# Patient Record
Sex: FEMALE | Race: WHITE | Employment: OTHER | URBAN - METROPOLITAN AREA
[De-identification: names, ages, dates, MRNs, and addresses within clinical notes are randomized per-mention and may not be internally consistent; named-entity substitution may affect disease eponyms.]

---

## 2017-04-24 ENCOUNTER — APPOINTMENT (OUTPATIENT)
Dept: ULTRASOUND IMAGING | Age: 57
End: 2017-04-24
Payer: MEDICARE

## 2017-04-24 ENCOUNTER — HOSPITAL ENCOUNTER (EMERGENCY)
Age: 57
Discharge: HOME | End: 2017-04-24
Payer: MEDICARE

## 2017-04-24 VITALS
DIASTOLIC BLOOD PRESSURE: 75 MMHG | TEMPERATURE: 97.5 F | HEART RATE: 64 BPM | SYSTOLIC BLOOD PRESSURE: 122 MMHG | OXYGEN SATURATION: 98 % | BODY MASS INDEX: 35.64 KG/M2 | HEIGHT: 63 IN | WEIGHT: 201.13 LBS | RESPIRATION RATE: 18 BRPM

## 2017-04-24 DIAGNOSIS — M79.604 PAIN IN BOTH LOWER EXTREMITIES: Primary | ICD-10-CM

## 2017-04-24 DIAGNOSIS — M79.605 PAIN IN BOTH LOWER EXTREMITIES: Primary | ICD-10-CM

## 2017-04-24 LAB
ALBUMIN SERPL-MCNC: 4 G/DL (ref 3.9–4.9)
ALP BLD-CCNC: 108 U/L (ref 40–130)
ALT SERPL-CCNC: 13 U/L (ref 0–33)
ANION GAP SERPL CALCULATED.3IONS-SCNC: 13 MEQ/L (ref 7–13)
APTT: 30.1 SEC (ref 21.6–35.4)
AST SERPL-CCNC: 14 U/L (ref 0–35)
BILIRUB SERPL-MCNC: 0.1 MG/DL (ref 0–1.2)
BUN BLDV-MCNC: 14 MG/DL (ref 6–20)
CALCIUM SERPL-MCNC: 9 MG/DL (ref 8.6–10.2)
CHLORIDE BLD-SCNC: 107 MEQ/L (ref 98–107)
CO2: 23 MEQ/L (ref 22–29)
CREAT SERPL-MCNC: 0.38 MG/DL (ref 0.5–0.9)
GFR AFRICAN AMERICAN: >60
GFR NON-AFRICAN AMERICAN: >60
GLOBULIN: 2.4 G/DL (ref 2.3–3.5)
GLUCOSE BLD-MCNC: 83 MG/DL (ref 74–109)
HCT VFR BLD CALC: 40.6 % (ref 37–47)
HEMOGLOBIN: 13.8 G/DL (ref 12–16)
INR BLD: 1
MCH RBC QN AUTO: 32 PG (ref 27–31.3)
MCHC RBC AUTO-ENTMCNC: 34 % (ref 33–37)
MCV RBC AUTO: 94.2 FL (ref 82–100)
PDW BLD-RTO: 13.4 % (ref 11.5–14.5)
PLATELET # BLD: 166 K/UL (ref 130–400)
POTASSIUM SERPL-SCNC: 4 MEQ/L (ref 3.5–5.1)
PROTHROMBIN TIME: 10.6 SEC (ref 8.1–13.7)
RBC # BLD: 4.31 M/UL (ref 4.2–5.4)
SODIUM BLD-SCNC: 143 MEQ/L (ref 132–144)
TOTAL PROTEIN: 6.4 G/DL (ref 6.4–8.1)
WBC # BLD: 6.4 K/UL (ref 4.8–10.8)

## 2017-04-24 PROCEDURE — 80053 COMPREHEN METABOLIC PANEL: CPT

## 2017-04-24 PROCEDURE — 85730 THROMBOPLASTIN TIME PARTIAL: CPT

## 2017-04-24 PROCEDURE — 93970 EXTREMITY STUDY: CPT

## 2017-04-24 PROCEDURE — 6370000000 HC RX 637 (ALT 250 FOR IP): Performed by: PHYSICIAN ASSISTANT

## 2017-04-24 PROCEDURE — 85027 COMPLETE CBC AUTOMATED: CPT

## 2017-04-24 PROCEDURE — 93005 ELECTROCARDIOGRAM TRACING: CPT

## 2017-04-24 PROCEDURE — 99284 EMERGENCY DEPT VISIT MOD MDM: CPT

## 2017-04-24 PROCEDURE — 36415 COLL VENOUS BLD VENIPUNCTURE: CPT

## 2017-04-24 PROCEDURE — 85610 PROTHROMBIN TIME: CPT

## 2017-04-24 RX ORDER — OXYCODONE HYDROCHLORIDE AND ACETAMINOPHEN 5; 325 MG/1; MG/1
1 TABLET ORAL ONCE
Status: COMPLETED | OUTPATIENT
Start: 2017-04-24 | End: 2017-04-24

## 2017-04-24 RX ADMIN — OXYCODONE HYDROCHLORIDE AND ACETAMINOPHEN 1 TABLET: 5; 325 TABLET ORAL at 19:02

## 2017-04-24 ASSESSMENT — PAIN SCALES - GENERAL
PAINLEVEL_OUTOF10: 10
PAINLEVEL_OUTOF10: 10

## 2017-04-24 ASSESSMENT — ENCOUNTER SYMPTOMS
SHORTNESS OF BREATH: 0
COLOR CHANGE: 0
WHEEZING: 0
COUGH: 0
CONSTIPATION: 0
ABDOMINAL DISTENTION: 0
BACK PAIN: 0
EYE DISCHARGE: 0
VOMITING: 0
NAUSEA: 0
ABDOMINAL PAIN: 0
SORE THROAT: 0
CHOKING: 0
STRIDOR: 0
RHINORRHEA: 0

## 2017-04-24 ASSESSMENT — PAIN DESCRIPTION - LOCATION: LOCATION: LEG

## 2017-04-24 ASSESSMENT — PAIN DESCRIPTION - DESCRIPTORS: DESCRIPTORS: CONSTANT

## 2017-04-24 ASSESSMENT — PAIN DESCRIPTION - PAIN TYPE: TYPE: CHRONIC PAIN

## 2017-04-25 LAB
EKG ATRIAL RATE: 55 BPM
EKG P AXIS: 58 DEGREES
EKG P-R INTERVAL: 198 MS
EKG Q-T INTERVAL: 492 MS
EKG QRS DURATION: 162 MS
EKG QTC CALCULATION (BAZETT): 470 MS
EKG R AXIS: -55 DEGREES
EKG T AXIS: 95 DEGREES
EKG VENTRICULAR RATE: 55 BPM

## 2017-05-12 ENCOUNTER — HOSPITAL ENCOUNTER (INPATIENT)
Age: 57
LOS: 3 days | Discharge: NURSING, REHAB OR LONG-TERM CARE | DRG: 349 | End: 2017-05-15
Attending: STUDENT IN AN ORGANIZED HEALTH CARE EDUCATION/TRAINING PROGRAM | Admitting: INTERNAL MEDICINE
Payer: MEDICAID

## 2017-05-12 ENCOUNTER — APPOINTMENT (OUTPATIENT)
Dept: CT IMAGING | Age: 57
DRG: 349 | End: 2017-05-12
Payer: MEDICAID

## 2017-05-12 DIAGNOSIS — Z86.79 HISTORY OF PERIPHERAL VASCULAR DISEASE: ICD-10-CM

## 2017-05-12 DIAGNOSIS — T87.43 INFECTION OF AMPUTATION STUMP, RIGHT LOWER EXTREMITY (HCC): Primary | ICD-10-CM

## 2017-05-12 DIAGNOSIS — Z78.9 POOR HISTORIAN: ICD-10-CM

## 2017-05-12 LAB
ALBUMIN SERPL-MCNC: 4.1 G/DL (ref 3.9–4.9)
ALP BLD-CCNC: 114 U/L (ref 40–130)
ALT SERPL-CCNC: 12 U/L (ref 0–33)
ANION GAP SERPL CALCULATED.3IONS-SCNC: 9 MEQ/L (ref 7–13)
APTT: 24.3 SEC (ref 21.6–35.4)
AST SERPL-CCNC: 14 U/L (ref 0–35)
BASOPHILS ABSOLUTE: 0.1 K/UL (ref 0–0.2)
BASOPHILS RELATIVE PERCENT: 1 %
BILIRUB SERPL-MCNC: 0.1 MG/DL (ref 0–1.2)
BILIRUBIN URINE: NEGATIVE
BLOOD, URINE: NEGATIVE
BUN BLDV-MCNC: 12 MG/DL (ref 6–20)
C-REACTIVE PROTEIN, HIGH SENSITIVITY: 3.1 MG/L (ref 0–5)
CALCIUM SERPL-MCNC: 9.1 MG/DL (ref 8.6–10.2)
CHLORIDE BLD-SCNC: 105 MEQ/L (ref 98–107)
CLARITY: CLEAR
CO2: 26 MEQ/L (ref 22–29)
COLOR: YELLOW
CREAT SERPL-MCNC: 0.46 MG/DL (ref 0.5–0.9)
EOSINOPHILS ABSOLUTE: 0.3 K/UL (ref 0–0.7)
EOSINOPHILS RELATIVE PERCENT: 5.7 %
GFR AFRICAN AMERICAN: >60
GFR NON-AFRICAN AMERICAN: >60
GLOBULIN: 2.5 G/DL (ref 2.3–3.5)
GLUCOSE BLD-MCNC: 86 MG/DL (ref 74–109)
GLUCOSE URINE: NEGATIVE MG/DL
HCT VFR BLD CALC: 43 % (ref 37–47)
HEMOGLOBIN: 14.3 G/DL (ref 12–16)
INR BLD: 1
KETONES, URINE: NEGATIVE MG/DL
LACTIC ACID: 0.8 MMOL/L (ref 0.5–2.2)
LEUKOCYTE ESTERASE, URINE: NEGATIVE
LYMPHOCYTES ABSOLUTE: 2.3 K/UL (ref 1–4.8)
LYMPHOCYTES RELATIVE PERCENT: 40.9 %
MCH RBC QN AUTO: 32.1 PG (ref 27–31.3)
MCHC RBC AUTO-ENTMCNC: 33.2 % (ref 33–37)
MCV RBC AUTO: 96.9 FL (ref 82–100)
MONOCYTES ABSOLUTE: 0.4 K/UL (ref 0.2–0.8)
MONOCYTES RELATIVE PERCENT: 7.8 %
NEUTROPHILS ABSOLUTE: 2.5 K/UL (ref 1.4–6.5)
NEUTROPHILS RELATIVE PERCENT: 44.6 %
NITRITE, URINE: NEGATIVE
PDW BLD-RTO: 13.8 % (ref 11.5–14.5)
PH UA: 7.5 (ref 5–9)
PLATELET # BLD: 171 K/UL (ref 130–400)
POC CREATININE WHOLE BLOOD: 0.5
POTASSIUM SERPL-SCNC: 4.1 MEQ/L (ref 3.5–5.1)
PROTEIN UA: NEGATIVE MG/DL
PROTHROMBIN TIME: 10.6 SEC (ref 8.1–13.7)
RBC # BLD: 4.44 M/UL (ref 4.2–5.4)
SEDIMENTATION RATE, ERYTHROCYTE: 13 MM (ref 0–30)
SODIUM BLD-SCNC: 140 MEQ/L (ref 132–144)
SPECIFIC GRAVITY UA: 1.01 (ref 1–1.03)
TOTAL CK: 61 U/L (ref 0–170)
TOTAL PROTEIN: 6.6 G/DL (ref 6.4–8.1)
URINE REFLEX TO CULTURE: NORMAL
UROBILINOGEN, URINE: 0.2 E.U./DL
WBC # BLD: 5.6 K/UL (ref 4.8–10.8)

## 2017-05-12 PROCEDURE — 2580000003 HC RX 258: Performed by: INTERNAL MEDICINE

## 2017-05-12 PROCEDURE — 81003 URINALYSIS AUTO W/O SCOPE: CPT

## 2017-05-12 PROCEDURE — 96375 TX/PRO/DX INJ NEW DRUG ADDON: CPT

## 2017-05-12 PROCEDURE — 96367 TX/PROPH/DG ADDL SEQ IV INF: CPT

## 2017-05-12 PROCEDURE — 87077 CULTURE AEROBIC IDENTIFY: CPT

## 2017-05-12 PROCEDURE — 85652 RBC SED RATE AUTOMATED: CPT

## 2017-05-12 PROCEDURE — 73701 CT LOWER EXTREMITY W/DYE: CPT

## 2017-05-12 PROCEDURE — 6360000004 HC RX CONTRAST MEDICATION: Performed by: RADIOLOGY

## 2017-05-12 PROCEDURE — 85730 THROMBOPLASTIN TIME PARTIAL: CPT

## 2017-05-12 PROCEDURE — 80053 COMPREHEN METABOLIC PANEL: CPT

## 2017-05-12 PROCEDURE — 86141 C-REACTIVE PROTEIN HS: CPT

## 2017-05-12 PROCEDURE — 85610 PROTHROMBIN TIME: CPT

## 2017-05-12 PROCEDURE — 1210000000 HC MED SURG R&B

## 2017-05-12 PROCEDURE — 85025 COMPLETE CBC W/AUTO DIFF WBC: CPT

## 2017-05-12 PROCEDURE — 82550 ASSAY OF CK (CPK): CPT

## 2017-05-12 PROCEDURE — 2580000003 HC RX 258: Performed by: STUDENT IN AN ORGANIZED HEALTH CARE EDUCATION/TRAINING PROGRAM

## 2017-05-12 PROCEDURE — 2580000003 HC RX 258: Performed by: RADIOLOGY

## 2017-05-12 PROCEDURE — 96365 THER/PROPH/DIAG IV INF INIT: CPT

## 2017-05-12 PROCEDURE — 83605 ASSAY OF LACTIC ACID: CPT

## 2017-05-12 PROCEDURE — 87149 DNA/RNA DIRECT PROBE: CPT

## 2017-05-12 PROCEDURE — 36415 COLL VENOUS BLD VENIPUNCTURE: CPT

## 2017-05-12 PROCEDURE — 6360000002 HC RX W HCPCS: Performed by: STUDENT IN AN ORGANIZED HEALTH CARE EDUCATION/TRAINING PROGRAM

## 2017-05-12 PROCEDURE — 87040 BLOOD CULTURE FOR BACTERIA: CPT

## 2017-05-12 PROCEDURE — 99285 EMERGENCY DEPT VISIT HI MDM: CPT

## 2017-05-12 PROCEDURE — 6370000000 HC RX 637 (ALT 250 FOR IP): Performed by: INTERNAL MEDICINE

## 2017-05-12 RX ORDER — ZONISAMIDE 100 MG/1
100 CAPSULE ORAL DAILY
Status: DISCONTINUED | OUTPATIENT
Start: 2017-05-13 | End: 2017-05-15 | Stop reason: HOSPADM

## 2017-05-12 RX ORDER — BISACODYL 10 MG
10 SUPPOSITORY, RECTAL RECTAL DAILY
Status: DISCONTINUED | OUTPATIENT
Start: 2017-05-13 | End: 2017-05-15 | Stop reason: HOSPADM

## 2017-05-12 RX ORDER — KETOROLAC TROMETHAMINE 30 MG/ML
30 INJECTION, SOLUTION INTRAMUSCULAR; INTRAVENOUS ONCE
Status: COMPLETED | OUTPATIENT
Start: 2017-05-12 | End: 2017-05-12

## 2017-05-12 RX ORDER — MORPHINE SULFATE 4 MG/ML
4 INJECTION, SOLUTION INTRAMUSCULAR; INTRAVENOUS
Status: DISCONTINUED | OUTPATIENT
Start: 2017-05-12 | End: 2017-05-15 | Stop reason: HOSPADM

## 2017-05-12 RX ORDER — GUAIFENESIN 100 MG/5ML
10 SOLUTION ORAL EVERY 4 HOURS PRN
Status: DISCONTINUED | OUTPATIENT
Start: 2017-05-12 | End: 2017-05-15 | Stop reason: HOSPADM

## 2017-05-12 RX ORDER — FENTANYL CITRATE 50 UG/ML
100 INJECTION, SOLUTION INTRAMUSCULAR; INTRAVENOUS ONCE
Status: COMPLETED | OUTPATIENT
Start: 2017-05-12 | End: 2017-05-12

## 2017-05-12 RX ORDER — ACETAMINOPHEN 325 MG/1
650 TABLET ORAL EVERY 4 HOURS PRN
Status: DISCONTINUED | OUTPATIENT
Start: 2017-05-12 | End: 2017-05-15 | Stop reason: HOSPADM

## 2017-05-12 RX ORDER — ASPIRIN 81 MG/1
81 TABLET, CHEWABLE ORAL DAILY
Status: DISCONTINUED | OUTPATIENT
Start: 2017-05-13 | End: 2017-05-15 | Stop reason: HOSPADM

## 2017-05-12 RX ORDER — FUROSEMIDE 20 MG/1
20 TABLET ORAL 2 TIMES DAILY
Status: DISCONTINUED | OUTPATIENT
Start: 2017-05-12 | End: 2017-05-15 | Stop reason: HOSPADM

## 2017-05-12 RX ORDER — MORPHINE SULFATE 2 MG/ML
2 INJECTION, SOLUTION INTRAMUSCULAR; INTRAVENOUS
Status: DISCONTINUED | OUTPATIENT
Start: 2017-05-12 | End: 2017-05-15 | Stop reason: HOSPADM

## 2017-05-12 RX ORDER — ATORVASTATIN CALCIUM 10 MG/1
10 TABLET, FILM COATED ORAL NIGHTLY
Status: DISCONTINUED | OUTPATIENT
Start: 2017-05-12 | End: 2017-05-15 | Stop reason: HOSPADM

## 2017-05-12 RX ORDER — SENNA AND DOCUSATE SODIUM 50; 8.6 MG/1; MG/1
1 TABLET, FILM COATED ORAL DAILY
Status: DISCONTINUED | OUTPATIENT
Start: 2017-05-13 | End: 2017-05-15 | Stop reason: HOSPADM

## 2017-05-12 RX ORDER — MAGNESIUM HYDROXIDE/ALUMINUM HYDROXICE/SIMETHICONE 120; 1200; 1200 MG/30ML; MG/30ML; MG/30ML
30 SUSPENSION ORAL EVERY 6 HOURS PRN
Status: DISCONTINUED | OUTPATIENT
Start: 2017-05-12 | End: 2017-05-15 | Stop reason: HOSPADM

## 2017-05-12 RX ORDER — ISOSORBIDE MONONITRATE 30 MG/1
30 TABLET, EXTENDED RELEASE ORAL DAILY
Status: DISCONTINUED | OUTPATIENT
Start: 2017-05-13 | End: 2017-05-15 | Stop reason: HOSPADM

## 2017-05-12 RX ORDER — SODIUM CHLORIDE 0.9 % (FLUSH) 0.9 %
10 SYRINGE (ML) INJECTION PRN
Status: DISCONTINUED | OUTPATIENT
Start: 2017-05-12 | End: 2017-05-15 | Stop reason: HOSPADM

## 2017-05-12 RX ORDER — ONDANSETRON 2 MG/ML
4 INJECTION INTRAMUSCULAR; INTRAVENOUS EVERY 6 HOURS PRN
Status: DISCONTINUED | OUTPATIENT
Start: 2017-05-12 | End: 2017-05-15 | Stop reason: HOSPADM

## 2017-05-12 RX ORDER — LANOLIN ALCOHOL/MO/W.PET/CERES
6 CREAM (GRAM) TOPICAL DAILY
Status: DISCONTINUED | OUTPATIENT
Start: 2017-05-13 | End: 2017-05-15 | Stop reason: HOSPADM

## 2017-05-12 RX ORDER — HYDROCODONE BITARTRATE AND ACETAMINOPHEN 5; 325 MG/1; MG/1
2 TABLET ORAL EVERY 4 HOURS PRN
Status: DISCONTINUED | OUTPATIENT
Start: 2017-05-12 | End: 2017-05-15 | Stop reason: HOSPADM

## 2017-05-12 RX ORDER — METOPROLOL SUCCINATE 25 MG/1
25 TABLET, EXTENDED RELEASE ORAL DAILY
Status: DISCONTINUED | OUTPATIENT
Start: 2017-05-13 | End: 2017-05-15 | Stop reason: HOSPADM

## 2017-05-12 RX ORDER — GABAPENTIN 300 MG/1
300 CAPSULE ORAL 3 TIMES DAILY
Status: DISCONTINUED | OUTPATIENT
Start: 2017-05-12 | End: 2017-05-15 | Stop reason: HOSPADM

## 2017-05-12 RX ORDER — DULOXETIN HYDROCHLORIDE 20 MG/1
40 CAPSULE, DELAYED RELEASE ORAL DAILY
Status: DISCONTINUED | OUTPATIENT
Start: 2017-05-13 | End: 2017-05-15 | Stop reason: HOSPADM

## 2017-05-12 RX ORDER — ACETAMINOPHEN 325 MG/1
650 TABLET ORAL EVERY 6 HOURS PRN
Status: DISCONTINUED | OUTPATIENT
Start: 2017-05-12 | End: 2017-05-15 | Stop reason: HOSPADM

## 2017-05-12 RX ORDER — HYDROCODONE BITARTRATE AND ACETAMINOPHEN 5; 325 MG/1; MG/1
1 TABLET ORAL EVERY 4 HOURS PRN
Status: DISCONTINUED | OUTPATIENT
Start: 2017-05-12 | End: 2017-05-15 | Stop reason: HOSPADM

## 2017-05-12 RX ORDER — SODIUM CHLORIDE 0.9 % (FLUSH) 0.9 %
10 SYRINGE (ML) INJECTION EVERY 12 HOURS SCHEDULED
Status: DISCONTINUED | OUTPATIENT
Start: 2017-05-12 | End: 2017-05-15 | Stop reason: HOSPADM

## 2017-05-12 RX ORDER — OXYCODONE HYDROCHLORIDE 5 MG/1
5 TABLET ORAL EVERY 4 HOURS PRN
Status: DISCONTINUED | OUTPATIENT
Start: 2017-05-12 | End: 2017-05-15 | Stop reason: HOSPADM

## 2017-05-12 RX ADMIN — GABAPENTIN 300 MG: 300 CAPSULE ORAL at 21:44

## 2017-05-12 RX ADMIN — VANCOMYCIN HYDROCHLORIDE 1250 MG: 1 INJECTION, POWDER, LYOPHILIZED, FOR SOLUTION INTRAVENOUS at 17:29

## 2017-05-12 RX ADMIN — Medication 10 ML: at 17:16

## 2017-05-12 RX ADMIN — PIPERACILLIN, TAZOBACTAM 3.38 G: 3; .375 INJECTION, POWDER, LYOPHILIZED, FOR SOLUTION INTRAVENOUS at 17:01

## 2017-05-12 RX ADMIN — IOPAMIDOL 100 ML: 755 INJECTION, SOLUTION INTRAVENOUS at 17:15

## 2017-05-12 RX ADMIN — FENTANYL CITRATE 100 MCG: 50 INJECTION, SOLUTION INTRAMUSCULAR; INTRAVENOUS at 18:07

## 2017-05-12 RX ADMIN — SODIUM CHLORIDE, PRESERVATIVE FREE 10 ML: 5 INJECTION INTRAVENOUS at 21:45

## 2017-05-12 RX ADMIN — ATORVASTATIN CALCIUM 10 MG: 10 TABLET, FILM COATED ORAL at 21:42

## 2017-05-12 RX ADMIN — HYDROCODONE BITARTRATE AND ACETAMINOPHEN 2 TABLET: 5; 325 TABLET ORAL at 21:42

## 2017-05-12 RX ADMIN — FUROSEMIDE 20 MG: 20 TABLET ORAL at 21:42

## 2017-05-12 RX ADMIN — KETOROLAC TROMETHAMINE 30 MG: 30 INJECTION, SOLUTION INTRAMUSCULAR at 16:43

## 2017-05-12 ASSESSMENT — PAIN DESCRIPTION - FREQUENCY
FREQUENCY: INTERMITTENT

## 2017-05-12 ASSESSMENT — PAIN SCALES - GENERAL
PAINLEVEL_OUTOF10: 10
PAINLEVEL_OUTOF10: 8
PAINLEVEL_OUTOF10: 10
PAINLEVEL_OUTOF10: 0
PAINLEVEL_OUTOF10: 7
PAINLEVEL_OUTOF10: 9
PAINLEVEL_OUTOF10: 8
PAINLEVEL_OUTOF10: 7

## 2017-05-12 ASSESSMENT — PAIN DESCRIPTION - ORIENTATION
ORIENTATION: RIGHT

## 2017-05-12 ASSESSMENT — PAIN DESCRIPTION - ONSET
ONSET: ON-GOING

## 2017-05-12 ASSESSMENT — PAIN DESCRIPTION - LOCATION
LOCATION: KNEE

## 2017-05-12 ASSESSMENT — PAIN DESCRIPTION - PROGRESSION
CLINICAL_PROGRESSION: GRADUALLY WORSENING
CLINICAL_PROGRESSION: GRADUALLY IMPROVING
CLINICAL_PROGRESSION: GRADUALLY IMPROVING

## 2017-05-12 ASSESSMENT — ENCOUNTER SYMPTOMS
COUGH: 0
SHORTNESS OF BREATH: 0

## 2017-05-12 ASSESSMENT — PAIN DESCRIPTION - DESCRIPTORS
DESCRIPTORS: ACHING

## 2017-05-12 ASSESSMENT — PAIN DESCRIPTION - PAIN TYPE
TYPE: ACUTE PAIN

## 2017-05-13 LAB
ALBUMIN SERPL-MCNC: 3.5 G/DL (ref 3.9–4.9)
ALP BLD-CCNC: 95 U/L (ref 40–130)
ALT SERPL-CCNC: 11 U/L (ref 0–33)
ANION GAP SERPL CALCULATED.3IONS-SCNC: 9 MEQ/L (ref 7–13)
AST SERPL-CCNC: 13 U/L (ref 0–35)
BILIRUB SERPL-MCNC: 0.1 MG/DL (ref 0–1.2)
BUN BLDV-MCNC: 13 MG/DL (ref 6–20)
CALCIUM SERPL-MCNC: 8.5 MG/DL (ref 8.6–10.2)
CHLORIDE BLD-SCNC: 112 MEQ/L (ref 98–107)
CO2: 26 MEQ/L (ref 22–29)
CREAT SERPL-MCNC: 0.4 MG/DL (ref 0.5–0.9)
GFR AFRICAN AMERICAN: >60
GFR NON-AFRICAN AMERICAN: >60
GLOBULIN: 2.3 G/DL (ref 2.3–3.5)
GLUCOSE BLD-MCNC: 74 MG/DL (ref 74–109)
HCT VFR BLD CALC: 41.2 % (ref 37–47)
HEMOGLOBIN: 13.8 G/DL (ref 12–16)
MCH RBC QN AUTO: 32.4 PG (ref 27–31.3)
MCHC RBC AUTO-ENTMCNC: 33.4 % (ref 33–37)
MCV RBC AUTO: 97.1 FL (ref 82–100)
PDW BLD-RTO: 13.8 % (ref 11.5–14.5)
PLATELET # BLD: 148 K/UL (ref 130–400)
POTASSIUM SERPL-SCNC: 4.1 MEQ/L (ref 3.5–5.1)
RBC # BLD: 4.24 M/UL (ref 4.2–5.4)
SODIUM BLD-SCNC: 147 MEQ/L (ref 132–144)
TOTAL PROTEIN: 5.8 G/DL (ref 6.4–8.1)
WBC # BLD: 5.4 K/UL (ref 4.8–10.8)

## 2017-05-13 PROCEDURE — 6370000000 HC RX 637 (ALT 250 FOR IP): Performed by: INTERNAL MEDICINE

## 2017-05-13 PROCEDURE — 2580000003 HC RX 258: Performed by: INTERNAL MEDICINE

## 2017-05-13 PROCEDURE — G8978 MOBILITY CURRENT STATUS: HCPCS

## 2017-05-13 PROCEDURE — 6360000002 HC RX W HCPCS: Performed by: INTERNAL MEDICINE

## 2017-05-13 PROCEDURE — G8979 MOBILITY GOAL STATUS: HCPCS

## 2017-05-13 PROCEDURE — 80053 COMPREHEN METABOLIC PANEL: CPT

## 2017-05-13 PROCEDURE — 85027 COMPLETE CBC AUTOMATED: CPT

## 2017-05-13 PROCEDURE — 36415 COLL VENOUS BLD VENIPUNCTURE: CPT

## 2017-05-13 PROCEDURE — 99254 IP/OBS CNSLTJ NEW/EST MOD 60: CPT | Performed by: INTERNAL MEDICINE

## 2017-05-13 PROCEDURE — 1210000000 HC MED SURG R&B

## 2017-05-13 PROCEDURE — 97162 PT EVAL MOD COMPLEX 30 MIN: CPT

## 2017-05-13 RX ADMIN — DULOXETINE 40 MG: 20 CAPSULE, DELAYED RELEASE ORAL at 10:16

## 2017-05-13 RX ADMIN — ASPIRIN 81 MG CHEWABLE TABLET 81 MG: 81 TABLET CHEWABLE at 10:16

## 2017-05-13 RX ADMIN — HYDROCODONE BITARTRATE AND ACETAMINOPHEN 2 TABLET: 5; 325 TABLET ORAL at 06:45

## 2017-05-13 RX ADMIN — EXTENDED PHENYTOIN SODIUM 30 MG: 30 CAPSULE ORAL at 10:16

## 2017-05-13 RX ADMIN — ISOSORBIDE MONONITRATE 30 MG: 30 TABLET, EXTENDED RELEASE ORAL at 10:16

## 2017-05-13 RX ADMIN — GABAPENTIN 300 MG: 300 CAPSULE ORAL at 14:19

## 2017-05-13 RX ADMIN — MORPHINE SULFATE 4 MG: 4 INJECTION, SOLUTION INTRAMUSCULAR; INTRAVENOUS at 14:19

## 2017-05-13 RX ADMIN — EXTENDED PHENYTOIN SODIUM 30 MG: 30 CAPSULE ORAL at 01:51

## 2017-05-13 RX ADMIN — HYDROCODONE BITARTRATE AND ACETAMINOPHEN 2 TABLET: 5; 325 TABLET ORAL at 18:46

## 2017-05-13 RX ADMIN — PIPERACILLIN SODIUM AND TAZOBACTAM SODIUM 3.38 G: 3; .375 INJECTION, POWDER, LYOPHILIZED, FOR SOLUTION INTRAVENOUS at 17:09

## 2017-05-13 RX ADMIN — PIPERACILLIN SODIUM AND TAZOBACTAM SODIUM 3.38 G: 3; .375 INJECTION, POWDER, LYOPHILIZED, FOR SOLUTION INTRAVENOUS at 10:17

## 2017-05-13 RX ADMIN — ENOXAPARIN SODIUM 40 MG: 40 INJECTION SUBCUTANEOUS at 10:15

## 2017-05-13 RX ADMIN — OXYCODONE HYDROCHLORIDE 5 MG: 5 TABLET ORAL at 21:50

## 2017-05-13 RX ADMIN — PIPERACILLIN SODIUM AND TAZOBACTAM SODIUM 3.38 G: 3; .375 INJECTION, POWDER, LYOPHILIZED, FOR SOLUTION INTRAVENOUS at 01:51

## 2017-05-13 RX ADMIN — SODIUM CHLORIDE, PRESERVATIVE FREE 10 ML: 5 INJECTION INTRAVENOUS at 21:50

## 2017-05-13 RX ADMIN — HYDROCODONE BITARTRATE AND ACETAMINOPHEN 2 TABLET: 5; 325 TABLET ORAL at 11:34

## 2017-05-13 RX ADMIN — FUROSEMIDE 20 MG: 20 TABLET ORAL at 10:16

## 2017-05-13 RX ADMIN — MELATONIN 6 MG: 3 TAB ORAL at 10:16

## 2017-05-13 RX ADMIN — ZONISAMIDE 100 MG: 100 CAPSULE ORAL at 10:16

## 2017-05-13 RX ADMIN — GABAPENTIN 300 MG: 300 CAPSULE ORAL at 10:15

## 2017-05-13 RX ADMIN — EXTENDED PHENYTOIN SODIUM 30 MG: 30 CAPSULE ORAL at 21:50

## 2017-05-13 RX ADMIN — FUROSEMIDE 20 MG: 20 TABLET ORAL at 21:50

## 2017-05-13 RX ADMIN — HYDROCODONE BITARTRATE AND ACETAMINOPHEN 2 TABLET: 5; 325 TABLET ORAL at 01:46

## 2017-05-13 RX ADMIN — GABAPENTIN 300 MG: 300 CAPSULE ORAL at 21:50

## 2017-05-13 RX ADMIN — ATORVASTATIN CALCIUM 10 MG: 10 TABLET, FILM COATED ORAL at 21:50

## 2017-05-13 RX ADMIN — SODIUM CHLORIDE, PRESERVATIVE FREE 10 ML: 5 INJECTION INTRAVENOUS at 10:17

## 2017-05-13 RX ADMIN — SENNOSIDES AND DOCUSATE SODIUM 1 TABLET: 8.6; 5 TABLET ORAL at 10:15

## 2017-05-13 RX ADMIN — METOPROLOL SUCCINATE 25 MG: 25 TABLET, EXTENDED RELEASE ORAL at 10:16

## 2017-05-13 ASSESSMENT — PAIN DESCRIPTION - ORIENTATION
ORIENTATION: RIGHT

## 2017-05-13 ASSESSMENT — PAIN DESCRIPTION - DESCRIPTORS
DESCRIPTORS: ACHING
DESCRIPTORS: ACHING

## 2017-05-13 ASSESSMENT — PAIN SCALES - GENERAL
PAINLEVEL_OUTOF10: 10
PAINLEVEL_OUTOF10: 9
PAINLEVEL_OUTOF10: 10
PAINLEVEL_OUTOF10: 7
PAINLEVEL_OUTOF10: 8
PAINLEVEL_OUTOF10: 6
PAINLEVEL_OUTOF10: 10

## 2017-05-13 ASSESSMENT — PAIN DESCRIPTION - LOCATION
LOCATION: GENERALIZED;LEG
LOCATION: LEG

## 2017-05-13 ASSESSMENT — PAIN DESCRIPTION - PAIN TYPE
TYPE: PHANTOM PAIN
TYPE: CHRONIC PAIN
TYPE: PHANTOM PAIN

## 2017-05-14 LAB
ANION GAP SERPL CALCULATED.3IONS-SCNC: 11 MEQ/L (ref 7–13)
BUN BLDV-MCNC: 13 MG/DL (ref 6–20)
CALCIUM SERPL-MCNC: 8.6 MG/DL (ref 8.6–10.2)
CHLORIDE BLD-SCNC: 104 MEQ/L (ref 98–107)
CO2: 24 MEQ/L (ref 22–29)
CREAT SERPL-MCNC: 0.37 MG/DL (ref 0.5–0.9)
GFR AFRICAN AMERICAN: >60
GFR NON-AFRICAN AMERICAN: >60
GLUCOSE BLD-MCNC: 111 MG/DL (ref 74–109)
POTASSIUM SERPL-SCNC: 4 MEQ/L (ref 3.5–5.1)
SODIUM BLD-SCNC: 139 MEQ/L (ref 132–144)

## 2017-05-14 PROCEDURE — 36415 COLL VENOUS BLD VENIPUNCTURE: CPT

## 2017-05-14 PROCEDURE — 6370000000 HC RX 637 (ALT 250 FOR IP): Performed by: INTERNAL MEDICINE

## 2017-05-14 PROCEDURE — 2580000003 HC RX 258: Performed by: INTERNAL MEDICINE

## 2017-05-14 PROCEDURE — 80048 BASIC METABOLIC PNL TOTAL CA: CPT

## 2017-05-14 PROCEDURE — 87040 BLOOD CULTURE FOR BACTERIA: CPT

## 2017-05-14 PROCEDURE — 1210000000 HC MED SURG R&B

## 2017-05-14 PROCEDURE — 6360000002 HC RX W HCPCS: Performed by: INTERNAL MEDICINE

## 2017-05-14 RX ADMIN — DULOXETINE 40 MG: 20 CAPSULE, DELAYED RELEASE ORAL at 09:38

## 2017-05-14 RX ADMIN — PIPERACILLIN SODIUM AND TAZOBACTAM SODIUM 3.38 G: 3; .375 INJECTION, POWDER, LYOPHILIZED, FOR SOLUTION INTRAVENOUS at 20:40

## 2017-05-14 RX ADMIN — ZONISAMIDE 100 MG: 100 CAPSULE ORAL at 09:38

## 2017-05-14 RX ADMIN — SODIUM CHLORIDE, PRESERVATIVE FREE 10 ML: 5 INJECTION INTRAVENOUS at 09:46

## 2017-05-14 RX ADMIN — ISOSORBIDE MONONITRATE 30 MG: 30 TABLET, EXTENDED RELEASE ORAL at 09:38

## 2017-05-14 RX ADMIN — EXTENDED PHENYTOIN SODIUM 30 MG: 30 CAPSULE ORAL at 20:42

## 2017-05-14 RX ADMIN — PIPERACILLIN SODIUM AND TAZOBACTAM SODIUM 3.38 G: 3; .375 INJECTION, POWDER, LYOPHILIZED, FOR SOLUTION INTRAVENOUS at 05:26

## 2017-05-14 RX ADMIN — SODIUM CHLORIDE, PRESERVATIVE FREE 10 ML: 5 INJECTION INTRAVENOUS at 20:42

## 2017-05-14 RX ADMIN — ASPIRIN 81 MG CHEWABLE TABLET 81 MG: 81 TABLET CHEWABLE at 09:37

## 2017-05-14 RX ADMIN — OXYCODONE HYDROCHLORIDE 5 MG: 5 TABLET ORAL at 09:45

## 2017-05-14 RX ADMIN — EXTENDED PHENYTOIN SODIUM 30 MG: 30 CAPSULE ORAL at 09:37

## 2017-05-14 RX ADMIN — MORPHINE SULFATE 4 MG: 4 INJECTION, SOLUTION INTRAMUSCULAR; INTRAVENOUS at 20:40

## 2017-05-14 RX ADMIN — MELATONIN 6 MG: 3 TAB ORAL at 09:38

## 2017-05-14 RX ADMIN — GABAPENTIN 300 MG: 300 CAPSULE ORAL at 13:24

## 2017-05-14 RX ADMIN — OXYCODONE HYDROCHLORIDE 5 MG: 5 TABLET ORAL at 13:38

## 2017-05-14 RX ADMIN — SENNOSIDES AND DOCUSATE SODIUM 1 TABLET: 8.6; 5 TABLET ORAL at 09:37

## 2017-05-14 RX ADMIN — FUROSEMIDE 20 MG: 20 TABLET ORAL at 09:37

## 2017-05-14 RX ADMIN — GABAPENTIN 300 MG: 300 CAPSULE ORAL at 20:42

## 2017-05-14 RX ADMIN — METOPROLOL SUCCINATE 25 MG: 25 TABLET, EXTENDED RELEASE ORAL at 09:38

## 2017-05-14 RX ADMIN — HYDROCODONE BITARTRATE AND ACETAMINOPHEN 2 TABLET: 5; 325 TABLET ORAL at 17:52

## 2017-05-14 RX ADMIN — ATORVASTATIN CALCIUM 10 MG: 10 TABLET, FILM COATED ORAL at 20:42

## 2017-05-14 RX ADMIN — FUROSEMIDE 20 MG: 20 TABLET ORAL at 20:42

## 2017-05-14 RX ADMIN — GABAPENTIN 300 MG: 300 CAPSULE ORAL at 09:38

## 2017-05-14 RX ADMIN — PIPERACILLIN SODIUM AND TAZOBACTAM SODIUM 3.38 G: 3; .375 INJECTION, POWDER, LYOPHILIZED, FOR SOLUTION INTRAVENOUS at 13:18

## 2017-05-14 RX ADMIN — ENOXAPARIN SODIUM 40 MG: 40 INJECTION SUBCUTANEOUS at 09:40

## 2017-05-14 ASSESSMENT — PAIN SCALES - GENERAL
PAINLEVEL_OUTOF10: 10
PAINLEVEL_OUTOF10: 8
PAINLEVEL_OUTOF10: 7
PAINLEVEL_OUTOF10: 7
PAINLEVEL_OUTOF10: 10

## 2017-05-14 ASSESSMENT — PAIN DESCRIPTION - LOCATION: LOCATION: LEG

## 2017-05-14 ASSESSMENT — PAIN DESCRIPTION - PAIN TYPE: TYPE: CHRONIC PAIN;PHANTOM PAIN

## 2017-05-14 ASSESSMENT — PAIN DESCRIPTION - ORIENTATION: ORIENTATION: RIGHT

## 2017-05-15 VITALS
HEART RATE: 58 BPM | WEIGHT: 205 LBS | DIASTOLIC BLOOD PRESSURE: 54 MMHG | OXYGEN SATURATION: 100 % | HEIGHT: 62 IN | TEMPERATURE: 98.1 F | RESPIRATION RATE: 16 BRPM | SYSTOLIC BLOOD PRESSURE: 102 MMHG | BODY MASS INDEX: 37.73 KG/M2

## 2017-05-15 LAB
CULTURE, BLOOD 2: ABNORMAL
GFR AFRICAN AMERICAN: >60
GFR NON-AFRICAN AMERICAN: >60
ORGANISM: ABNORMAL
ORGANISM: ABNORMAL
PERFORMED ON: ABNORMAL
POC CREATININE: 0.5 MG/DL (ref 0.6–1.1)
POC SAMPLE TYPE: ABNORMAL

## 2017-05-15 PROCEDURE — 2580000003 HC RX 258: Performed by: INTERNAL MEDICINE

## 2017-05-15 PROCEDURE — 6360000002 HC RX W HCPCS: Performed by: INTERNAL MEDICINE

## 2017-05-15 PROCEDURE — 6370000000 HC RX 637 (ALT 250 FOR IP): Performed by: INTERNAL MEDICINE

## 2017-05-15 PROCEDURE — 97535 SELF CARE MNGMENT TRAINING: CPT

## 2017-05-15 PROCEDURE — 99232 SBSQ HOSP IP/OBS MODERATE 35: CPT | Performed by: INTERNAL MEDICINE

## 2017-05-15 RX ORDER — OXYCODONE HYDROCHLORIDE 5 MG/1
5 CAPSULE ORAL EVERY 4 HOURS PRN
Qty: 15 CAPSULE | Refills: 0 | Status: ON HOLD | OUTPATIENT
Start: 2017-05-15 | End: 2019-01-22 | Stop reason: HOSPADM

## 2017-05-15 RX ADMIN — ENOXAPARIN SODIUM 40 MG: 40 INJECTION SUBCUTANEOUS at 08:45

## 2017-05-15 RX ADMIN — SODIUM CHLORIDE, PRESERVATIVE FREE 10 ML: 5 INJECTION INTRAVENOUS at 08:50

## 2017-05-15 RX ADMIN — GABAPENTIN 300 MG: 300 CAPSULE ORAL at 14:14

## 2017-05-15 RX ADMIN — ISOSORBIDE MONONITRATE 30 MG: 30 TABLET, EXTENDED RELEASE ORAL at 08:48

## 2017-05-15 RX ADMIN — METOPROLOL SUCCINATE 25 MG: 25 TABLET, EXTENDED RELEASE ORAL at 08:44

## 2017-05-15 RX ADMIN — PIPERACILLIN SODIUM AND TAZOBACTAM SODIUM 3.38 G: 3; .375 INJECTION, POWDER, LYOPHILIZED, FOR SOLUTION INTRAVENOUS at 16:28

## 2017-05-15 RX ADMIN — HYDROCODONE BITARTRATE AND ACETAMINOPHEN 2 TABLET: 5; 325 TABLET ORAL at 08:48

## 2017-05-15 RX ADMIN — GABAPENTIN 300 MG: 300 CAPSULE ORAL at 08:45

## 2017-05-15 RX ADMIN — ZONISAMIDE 100 MG: 100 CAPSULE ORAL at 08:45

## 2017-05-15 RX ADMIN — EXTENDED PHENYTOIN SODIUM 30 MG: 30 CAPSULE ORAL at 08:45

## 2017-05-15 RX ADMIN — SENNOSIDES AND DOCUSATE SODIUM 1 TABLET: 8.6; 5 TABLET ORAL at 08:45

## 2017-05-15 RX ADMIN — FUROSEMIDE 20 MG: 20 TABLET ORAL at 08:45

## 2017-05-15 RX ADMIN — DULOXETINE 40 MG: 20 CAPSULE, DELAYED RELEASE ORAL at 08:45

## 2017-05-15 RX ADMIN — MELATONIN 6 MG: 3 TAB ORAL at 08:45

## 2017-05-15 RX ADMIN — HYDROCODONE BITARTRATE AND ACETAMINOPHEN 2 TABLET: 5; 325 TABLET ORAL at 14:14

## 2017-05-15 RX ADMIN — ASPIRIN 81 MG CHEWABLE TABLET 81 MG: 81 TABLET CHEWABLE at 08:45

## 2017-05-15 RX ADMIN — PIPERACILLIN SODIUM AND TAZOBACTAM SODIUM 3.38 G: 3; .375 INJECTION, POWDER, LYOPHILIZED, FOR SOLUTION INTRAVENOUS at 08:57

## 2017-05-15 ASSESSMENT — PAIN SCALES - GENERAL
PAINLEVEL_OUTOF10: 6
PAINLEVEL_OUTOF10: 10
PAINLEVEL_OUTOF10: 10
PAINLEVEL_OUTOF10: 7

## 2017-05-17 LAB — BLOOD CULTURE, ROUTINE: NORMAL

## 2017-05-19 LAB
BLOOD CULTURE, ROUTINE: NORMAL
CULTURE, BLOOD 2: NORMAL

## 2017-07-30 ENCOUNTER — HOSPITAL ENCOUNTER (EMERGENCY)
Age: 57
Discharge: HOME | End: 2017-07-31
Attending: EMERGENCY MEDICINE
Payer: MEDICAID

## 2017-07-30 DIAGNOSIS — M79.604 PAIN OF RIGHT LOWER EXTREMITY: Primary | ICD-10-CM

## 2017-07-30 PROCEDURE — 99283 EMERGENCY DEPT VISIT LOW MDM: CPT

## 2017-07-30 ASSESSMENT — PAIN SCALES - GENERAL: PAINLEVEL_OUTOF10: 10

## 2017-07-30 ASSESSMENT — PAIN DESCRIPTION - DESCRIPTORS: DESCRIPTORS: ACHING

## 2017-07-30 ASSESSMENT — PAIN DESCRIPTION - LOCATION: LOCATION: LEG

## 2017-07-30 ASSESSMENT — PAIN DESCRIPTION - FREQUENCY: FREQUENCY: CONTINUOUS

## 2017-07-30 ASSESSMENT — PAIN DESCRIPTION - PAIN TYPE: TYPE: ACUTE PAIN;PHANTOM PAIN

## 2017-07-30 ASSESSMENT — PAIN DESCRIPTION - ORIENTATION: ORIENTATION: RIGHT;LOWER;OTHER (COMMENT)

## 2017-07-31 ENCOUNTER — APPOINTMENT (OUTPATIENT)
Dept: ULTRASOUND IMAGING | Age: 57
End: 2017-07-31
Payer: MEDICAID

## 2017-07-31 VITALS
HEART RATE: 71 BPM | SYSTOLIC BLOOD PRESSURE: 106 MMHG | OXYGEN SATURATION: 94 % | RESPIRATION RATE: 14 BRPM | WEIGHT: 210 LBS | TEMPERATURE: 98.5 F | DIASTOLIC BLOOD PRESSURE: 52 MMHG | BODY MASS INDEX: 38.64 KG/M2 | HEIGHT: 62 IN

## 2017-07-31 LAB
ALBUMIN SERPL-MCNC: 3.8 G/DL (ref 3.9–4.9)
ALP BLD-CCNC: 107 U/L (ref 40–130)
ALT SERPL-CCNC: 15 U/L (ref 0–33)
ANION GAP SERPL CALCULATED.3IONS-SCNC: 12 MEQ/L (ref 7–13)
AST SERPL-CCNC: 16 U/L (ref 0–35)
BASOPHILS ABSOLUTE: 0.1 K/UL (ref 0–0.2)
BASOPHILS RELATIVE PERCENT: 0.9 %
BILIRUB SERPL-MCNC: 0.2 MG/DL (ref 0–1.2)
BUN BLDV-MCNC: 14 MG/DL (ref 6–20)
CALCIUM SERPL-MCNC: 8.6 MG/DL (ref 8.6–10.2)
CHLORIDE BLD-SCNC: 106 MEQ/L (ref 98–107)
CO2: 23 MEQ/L (ref 22–29)
CREAT SERPL-MCNC: 0.38 MG/DL (ref 0.5–0.9)
EOSINOPHILS ABSOLUTE: 0.4 K/UL (ref 0–0.7)
EOSINOPHILS RELATIVE PERCENT: 3.4 %
GFR AFRICAN AMERICAN: >60
GFR NON-AFRICAN AMERICAN: >60
GLOBULIN: 2.4 G/DL (ref 2.3–3.5)
GLUCOSE BLD-MCNC: 85 MG/DL (ref 74–109)
HCT VFR BLD CALC: 42 % (ref 37–47)
HEMOGLOBIN: 13.9 G/DL (ref 12–16)
LYMPHOCYTES ABSOLUTE: 3.1 K/UL (ref 1–4.8)
LYMPHOCYTES RELATIVE PERCENT: 28 %
MCH RBC QN AUTO: 32.4 PG (ref 27–31.3)
MCHC RBC AUTO-ENTMCNC: 33.1 % (ref 33–37)
MCV RBC AUTO: 98 FL (ref 82–100)
MONOCYTES ABSOLUTE: 0.6 K/UL (ref 0.2–0.8)
MONOCYTES RELATIVE PERCENT: 5.4 %
NEUTROPHILS ABSOLUTE: 7 K/UL (ref 1.4–6.5)
NEUTROPHILS RELATIVE PERCENT: 62.3 %
PDW BLD-RTO: 13.1 % (ref 11.5–14.5)
PLATELET # BLD: 174 K/UL (ref 130–400)
POTASSIUM SERPL-SCNC: 3.8 MEQ/L (ref 3.5–5.1)
RBC # BLD: 4.29 M/UL (ref 4.2–5.4)
SODIUM BLD-SCNC: 141 MEQ/L (ref 132–144)
TOTAL PROTEIN: 6.2 G/DL (ref 6.4–8.1)
WBC # BLD: 11.2 K/UL (ref 4.8–10.8)

## 2017-07-31 PROCEDURE — 80053 COMPREHEN METABOLIC PANEL: CPT

## 2017-07-31 PROCEDURE — 96375 TX/PRO/DX INJ NEW DRUG ADDON: CPT

## 2017-07-31 PROCEDURE — 36415 COLL VENOUS BLD VENIPUNCTURE: CPT

## 2017-07-31 PROCEDURE — 96374 THER/PROPH/DIAG INJ IV PUSH: CPT

## 2017-07-31 PROCEDURE — 93971 EXTREMITY STUDY: CPT

## 2017-07-31 PROCEDURE — 6360000002 HC RX W HCPCS: Performed by: EMERGENCY MEDICINE

## 2017-07-31 PROCEDURE — 85025 COMPLETE CBC W/AUTO DIFF WBC: CPT

## 2017-07-31 RX ORDER — LORAZEPAM 2 MG/ML
1 INJECTION INTRAMUSCULAR ONCE
Status: COMPLETED | OUTPATIENT
Start: 2017-07-31 | End: 2017-07-31

## 2017-07-31 RX ORDER — ONDANSETRON 2 MG/ML
4 INJECTION INTRAMUSCULAR; INTRAVENOUS ONCE
Status: COMPLETED | OUTPATIENT
Start: 2017-07-31 | End: 2017-07-31

## 2017-07-31 RX ADMIN — ONDANSETRON 4 MG: 2 INJECTION INTRAMUSCULAR; INTRAVENOUS at 01:10

## 2017-07-31 RX ADMIN — HYDROMORPHONE HYDROCHLORIDE 1 MG: 1 INJECTION, SOLUTION INTRAMUSCULAR; INTRAVENOUS; SUBCUTANEOUS at 01:10

## 2017-07-31 RX ADMIN — LORAZEPAM 1 MG: 2 INJECTION INTRAMUSCULAR; INTRAVENOUS at 01:10

## 2017-07-31 ASSESSMENT — PAIN SCALES - GENERAL
PAINLEVEL_OUTOF10: 0
PAINLEVEL_OUTOF10: 10

## 2017-07-31 ASSESSMENT — ENCOUNTER SYMPTOMS
ABDOMINAL DISTENTION: 0
VOMITING: 0
PHOTOPHOBIA: 0
SHORTNESS OF BREATH: 0
EYE DISCHARGE: 0
ABDOMINAL PAIN: 0
SORE THROAT: 0
COUGH: 0
WHEEZING: 0
CHEST TIGHTNESS: 0

## 2017-08-06 ENCOUNTER — HOSPITAL ENCOUNTER (EMERGENCY)
Age: 57
Discharge: HOME | End: 2017-08-06
Attending: STUDENT IN AN ORGANIZED HEALTH CARE EDUCATION/TRAINING PROGRAM
Payer: MEDICAID

## 2017-08-06 ENCOUNTER — APPOINTMENT (OUTPATIENT)
Dept: GENERAL RADIOLOGY | Age: 57
End: 2017-08-06
Payer: MEDICAID

## 2017-08-06 VITALS
HEART RATE: 77 BPM | WEIGHT: 205 LBS | DIASTOLIC BLOOD PRESSURE: 60 MMHG | BODY MASS INDEX: 37.73 KG/M2 | TEMPERATURE: 99.2 F | HEIGHT: 62 IN | SYSTOLIC BLOOD PRESSURE: 117 MMHG | RESPIRATION RATE: 17 BRPM | OXYGEN SATURATION: 96 %

## 2017-08-06 DIAGNOSIS — Z13.9 ENCOUNTER FOR MEDICAL SCREENING EXAMINATION: Primary | ICD-10-CM

## 2017-08-06 LAB
ACETAMINOPHEN LEVEL: <15 UG/ML (ref 10–30)
ALBUMIN SERPL-MCNC: 4.3 G/DL (ref 3.9–4.9)
ALP BLD-CCNC: 129 U/L (ref 40–130)
ALT SERPL-CCNC: 18 U/L (ref 0–33)
AMPHETAMINE SCREEN, URINE: POSITIVE
ANION GAP SERPL CALCULATED.3IONS-SCNC: 16 MEQ/L (ref 7–13)
AST SERPL-CCNC: 26 U/L (ref 0–35)
BARBITURATE SCREEN URINE: ABNORMAL
BASOPHILS ABSOLUTE: 0.1 K/UL (ref 0–0.2)
BASOPHILS RELATIVE PERCENT: 1.1 %
BENZODIAZEPINE SCREEN, URINE: ABNORMAL
BILIRUB SERPL-MCNC: 0.2 MG/DL (ref 0–1.2)
BILIRUBIN URINE: NEGATIVE
BLOOD, URINE: NEGATIVE
BUN BLDV-MCNC: 11 MG/DL (ref 6–20)
CALCIUM SERPL-MCNC: 8.7 MG/DL (ref 8.6–10.2)
CANNABINOID SCREEN URINE: ABNORMAL
CHLORIDE BLD-SCNC: 107 MEQ/L (ref 98–107)
CHP ED QC CHECK: YES
CLARITY: ABNORMAL
CO2: 21 MEQ/L (ref 22–29)
COCAINE METABOLITE SCREEN URINE: ABNORMAL
COLOR: YELLOW
CREAT SERPL-MCNC: 0.4 MG/DL (ref 0.5–0.9)
EOSINOPHILS ABSOLUTE: 0.3 K/UL (ref 0–0.7)
EOSINOPHILS RELATIVE PERCENT: 2.8 %
ETHANOL PERCENT: NORMAL G/DL
ETHANOL: <10 MG/DL (ref 0–0.08)
GFR AFRICAN AMERICAN: >60
GFR NON-AFRICAN AMERICAN: >60
GLOBULIN: 2.8 G/DL (ref 2.3–3.5)
GLUCOSE BLD-MCNC: 76 MG/DL (ref 74–109)
GLUCOSE URINE: NEGATIVE MG/DL
HCT VFR BLD CALC: 48.9 % (ref 37–47)
HEMOGLOBIN: 16.6 G/DL (ref 12–16)
KETONES, URINE: NEGATIVE MG/DL
LEUKOCYTE ESTERASE, URINE: NEGATIVE
LYMPHOCYTES ABSOLUTE: 2.9 K/UL (ref 1–4.8)
LYMPHOCYTES RELATIVE PERCENT: 27.4 %
Lab: ABNORMAL
MCH RBC QN AUTO: 32.8 PG (ref 27–31.3)
MCHC RBC AUTO-ENTMCNC: 33.8 % (ref 33–37)
MCV RBC AUTO: 97 FL (ref 82–100)
MONOCYTES ABSOLUTE: 0.8 K/UL (ref 0.2–0.8)
MONOCYTES RELATIVE PERCENT: 7.3 %
NEUTROPHILS ABSOLUTE: 6.5 K/UL (ref 1.4–6.5)
NEUTROPHILS RELATIVE PERCENT: 61.4 %
NITRITE, URINE: NEGATIVE
OPIATE SCREEN URINE: ABNORMAL
PDW BLD-RTO: 13.5 % (ref 11.5–14.5)
PH UA: 7 (ref 5–9)
PHENCYCLIDINE SCREEN URINE: ABNORMAL
PHENYTOIN LEVEL: 15.3 UG/ML (ref 10–20)
PLATELET # BLD: 158 K/UL (ref 130–400)
PLATELET SLIDE REVIEW: ADEQUATE
POTASSIUM SERPL-SCNC: 4.5 MEQ/L (ref 3.5–5.1)
PREGNANCY TEST URINE, POC: NORMAL
PROTEIN UA: ABNORMAL MG/DL
RBC # BLD: 5.05 M/UL (ref 4.2–5.4)
SALICYLATE, SERUM: <0.3 MG/DL (ref 15–30)
SODIUM BLD-SCNC: 144 MEQ/L (ref 132–144)
SPECIFIC GRAVITY UA: 1.03 (ref 1–1.03)
TOTAL CK: 79 U/L (ref 0–170)
TOTAL PROTEIN: 7.1 G/DL (ref 6.4–8.1)
TSH SERPL DL<=0.05 MIU/L-ACNC: 0.54 UIU/ML (ref 0.27–4.2)
URINE REFLEX TO CULTURE: ABNORMAL
UROBILINOGEN, URINE: 1 E.U./DL
WBC # BLD: 10.6 K/UL (ref 4.8–10.8)

## 2017-08-06 PROCEDURE — G0480 DRUG TEST DEF 1-7 CLASSES: HCPCS

## 2017-08-06 PROCEDURE — 71010 XR CHEST PORTABLE: CPT

## 2017-08-06 PROCEDURE — 81003 URINALYSIS AUTO W/O SCOPE: CPT

## 2017-08-06 PROCEDURE — 51701 INSERT BLADDER CATHETER: CPT

## 2017-08-06 PROCEDURE — 99283 EMERGENCY DEPT VISIT LOW MDM: CPT

## 2017-08-06 PROCEDURE — 84443 ASSAY THYROID STIM HORMONE: CPT

## 2017-08-06 PROCEDURE — 80185 ASSAY OF PHENYTOIN TOTAL: CPT

## 2017-08-06 PROCEDURE — 36415 COLL VENOUS BLD VENIPUNCTURE: CPT

## 2017-08-06 PROCEDURE — 80307 DRUG TEST PRSMV CHEM ANLYZR: CPT

## 2017-08-06 PROCEDURE — 85025 COMPLETE CBC W/AUTO DIFF WBC: CPT

## 2017-08-06 PROCEDURE — 82550 ASSAY OF CK (CPK): CPT

## 2017-08-06 PROCEDURE — 80053 COMPREHEN METABOLIC PANEL: CPT

## 2017-08-06 ASSESSMENT — ENCOUNTER SYMPTOMS
BACK PAIN: 0
TROUBLE SWALLOWING: 0
DIARRHEA: 0
SINUS PRESSURE: 0
ABDOMINAL PAIN: 0
VOMITING: 0
CHEST TIGHTNESS: 0
COUGH: 0
SHORTNESS OF BREATH: 0

## 2018-07-27 ENCOUNTER — HOSPITAL ENCOUNTER (EMERGENCY)
Age: 58
Discharge: HOME | End: 2018-07-27
Attending: EMERGENCY MEDICINE
Payer: MEDICAID

## 2018-07-27 VITALS
BODY MASS INDEX: 38.98 KG/M2 | WEIGHT: 220 LBS | OXYGEN SATURATION: 95 % | DIASTOLIC BLOOD PRESSURE: 52 MMHG | RESPIRATION RATE: 16 BRPM | SYSTOLIC BLOOD PRESSURE: 111 MMHG | HEART RATE: 72 BPM | HEIGHT: 63 IN | TEMPERATURE: 98.5 F

## 2018-07-27 DIAGNOSIS — F10.920 ACUTE ALCOHOLIC INTOXICATION WITHOUT COMPLICATION (HCC): Primary | ICD-10-CM

## 2018-07-27 LAB
ETHANOL PERCENT: 0.1 G/DL
ETHANOL: 111 MG/DL (ref 0–0.08)

## 2018-07-27 PROCEDURE — 99285 EMERGENCY DEPT VISIT HI MDM: CPT

## 2018-07-27 PROCEDURE — 36415 COLL VENOUS BLD VENIPUNCTURE: CPT

## 2018-07-27 PROCEDURE — G0480 DRUG TEST DEF 1-7 CLASSES: HCPCS

## 2018-07-27 PROCEDURE — 6370000000 HC RX 637 (ALT 250 FOR IP): Performed by: EMERGENCY MEDICINE

## 2018-07-27 RX ORDER — ACETAMINOPHEN 500 MG
1000 TABLET ORAL ONCE
Status: COMPLETED | OUTPATIENT
Start: 2018-07-27 | End: 2018-07-27

## 2018-07-27 RX ADMIN — ACETAMINOPHEN 1000 MG: 500 TABLET ORAL at 20:40

## 2018-07-27 ASSESSMENT — ENCOUNTER SYMPTOMS
WHEEZING: 0
EYE DISCHARGE: 0
SHORTNESS OF BREATH: 0
RHINORRHEA: 0
ABDOMINAL PAIN: 0
COLOR CHANGE: 0
FACIAL SWELLING: 0
ABDOMINAL DISTENTION: 0
PHOTOPHOBIA: 0
VOMITING: 0

## 2018-07-27 ASSESSMENT — PAIN SCALES - GENERAL: PAINLEVEL_OUTOF10: 8

## 2018-07-27 NOTE — ED TRIAGE NOTES
PT went out from the nursing home and went to the bar came back to the nursing home intoxicated  Pt reports that she drank beer

## 2018-07-27 NOTE — ED PROVIDER NOTES
[id number] ED  eMERGENCY dEPARTMENT eNCOUnter      Pt Name: Nirav De Leon  MRN: [id number]  [place] 1960  Date of evaluation: 7/27/2018  Provider: Darrin Fabry, [address] [place] [address]       Chief Complaint   Patient presents with    Alcohol Intoxication     from [address]   (Location/Symptom, Timing/Onset, Context/Setting, Quality, Duration, Modifying Factors, Severity)  Note limiting factors. Nirav De Leon is a 60 y.o. female who presents to the emergency department With a chief complaint provided by the nursing staff of [address] [place] [address]. Patient apparently went out for her birthday and came back intoxicated. They were uncomfortable caring for her and called 911. The patient herself has no complaints. There is no further history provided by staff or patient upon presentation. [facility]    Nursing Notes were reviewed. REVIEW OF SYSTEMS    (2-9 systems for level 4, 10 or more for level 5)     Review of Systems   Constitutional: Negative for activity change and appetite change. HENT: Negative for congestion, facial swelling and rhinorrhea. Eyes: Negative for photophobia and discharge. Respiratory: Negative for shortness of breath and wheezing. Cardiovascular: Negative for chest pain. Gastrointestinal: Negative for abdominal distention, abdominal pain and vomiting. Endocrine: Negative for polydipsia and polyphagia. Genitourinary: Negative for difficulty urinating, frequency, vaginal bleeding and vaginal discharge. Musculoskeletal: Negative for gait problem. Skin: Negative for color change. Allergic/Immunologic: Negative for immunocompromised state. Neurological: Negative for dizziness, weakness and light-headedness. Hematological: Negative for adenopathy. Psychiatric/Behavioral: Positive for agitation and behavioral problems. Except as noted above the remainder of the review of systems was reviewed and negative. PAST MEDICAL HISTORY     Past Medical History:   Diagnosis Date    Aphasia     CAD (coronary artery disease)     Constipation     Constipation     Depression     MAJOR DEPRESSIVE DISORDER    Diabetes mellitus ([facility] [address]     TYPE II    Hyperlipidemia     Hypertension     Insomnia     Muscle weakness     PVD (peripheral vascular disease) (HCC)     Seizures (HCC)     Vitamin D deficiency     Weakness          SURGICAL HISTORY       Past Surgical History:   Procedure Laterality Date    LEG AMPUTATION BELOW KNEE Right          CURRENT MEDICATIONS       Previous Medications    ACETAMINOPHEN (TYLENOL) 325 MG TABLET    Take 650 mg by mouth every 6 hours as needed for Pain    ACETAMINOPHEN (TYLENOL) 650 MG SUPPOSITORY    Place 650 mg rectally every 4 hours as needed for Fever    ALUMINUM-MAGNESIUM HYDROXIDE 200-200 MG/5ML SUSPENSION    Take by mouth every 6 hours as needed for Indigestion    ASPIRIN 81 MG CHEWABLE TABLET    Take 81 mg by mouth daily    ATORVASTATIN (LIPITOR) 10 MG TABLET    Take 10 mg by mouth daily    BISACODYL (DULCOLAX) 10 MG SUPPOSITORY    Place 10 mg rectally daily    DICLOFENAC SODIUM 1 % GEL    Apply 2 g topically 2 times daily    DULOXETINE (CYMBALTA) 60 MG EXTENDED RELEASE CAPSULE    Take 40 mg by mouth daily    FUROSEMIDE (LASIX) 20 MG TABLET    Take 20 mg by mouth 2 times daily    GABAPENTIN (NEURONTIN) 300 MG CAPSULE    Take 300 mg by mouth 3 times daily    GLUCOSE (GLUTOSE) 40 % GEL    Take 15 g by mouth See Admin Instructions    GUAIFENESIN (ROBITUSSIN) 100 MG/5ML SOLN ORAL SOLUTION    Take 10 mLs by mouth every 4 hours as needed for Cough    ISOSORBIDE MONONITRATE (IMDUR) 30 MG EXTENDED RELEASE TABLET    Take 30 mg by mouth daily    MAGNESIUM HYDROXIDE (MILK OF MAGNESIA) 400 MG/5ML SUSPENSION    Take 30 mLs by mouth daily as needed for Constipation    MELATONIN 3 MG TABS TABLET    Take 6 mg by mouth daily    METOPROLOL SUCCINATE (TOPROL XL) 25 MG EXTENDED RELEASE TABLET    Take is alert and oriented to person, place, and time. She has normal reflexes. Coordination abnormal.   Skin: Skin is warm and dry. Psychiatric: She has a normal mood and affect. DIAGNOSTIC RESULTS     EKG: All EKG's are interpreted by the Emergency Department Physician who either signs or Co-signs this chart in the absence of a cardiologist.        RADIOLOGY:   Non-plain film images such as CT, Ultrasound and MRI are read by the radiologist. Plain radiographic images are visualized and preliminarily interpreted by the emergency physician with the below findings:        Interpretation per the Radiologist below, if available at the time of this note:    No orders to display         ED BEDSIDE ULTRASOUND:   Performed by ED Physician - none    LABS:  Labs Reviewed   ETHANOL       All other labs were within normal range or not returned as of this dictation. EMERGENCY DEPARTMENT COURSE and DIFFERENTIAL DIAGNOSIS/MDM:   Vitals:    Vitals:    07/27/18 1845 07/27/18 1919   BP: (!) 102/47 116/60   Pulse: 79 74   Resp: 16 18   Temp: 98.5 °F (36.9 °C)    TempSrc: Oral    SpO2: 92% 93%   Weight: 220 lb (99.8 kg)    Height: 5' 3\" (1.6 m)        Patient is observed in the emergency department for a short while. Staff is contacted and they report that she was combative however they do admit that she was sort of flapping in them weekly and they had no real concerned for their physical well-being. Patient is not combative at all for us here and she is very cooperative. She complains only of a headache. Her alcohol is just above legal limit. Her vital signs are stable and it is felt that she can easily be discharged back to her inpatient facility. Patient is agreeable to this and is discharged in stable condition. MDM    CRITICAL CARE TIME   Total Critical Care time was 0 minutes, excluding separately reportable procedures.   There was a high probability of clinically significant/life threatening deterioration in the

## 2019-01-14 ENCOUNTER — APPOINTMENT (OUTPATIENT)
Dept: GENERAL RADIOLOGY | Age: 59
DRG: 042 | End: 2019-01-14
Payer: MEDICAID

## 2019-01-14 ENCOUNTER — APPOINTMENT (OUTPATIENT)
Dept: CT IMAGING | Age: 59
DRG: 042 | End: 2019-01-14
Payer: MEDICAID

## 2019-01-14 ENCOUNTER — HOSPITAL ENCOUNTER (INPATIENT)
Age: 59
LOS: 7 days | Discharge: HOME | DRG: 042 | End: 2019-01-22
Attending: STUDENT IN AN ORGANIZED HEALTH CARE EDUCATION/TRAINING PROGRAM | Admitting: PSYCHIATRY & NEUROLOGY
Payer: MEDICAID

## 2019-01-14 DIAGNOSIS — F01.50 VASCULAR DEMENTIA WITHOUT BEHAVIORAL DISTURBANCE (HCC): Primary | ICD-10-CM

## 2019-01-14 LAB
ACETAMINOPHEN LEVEL: <5 UG/ML (ref 10–30)
ALBUMIN SERPL-MCNC: 3.9 G/DL (ref 3.9–4.9)
ALP BLD-CCNC: 108 U/L (ref 40–130)
ALT SERPL-CCNC: 15 U/L (ref 0–33)
AMPHETAMINE SCREEN, URINE: NORMAL
ANION GAP SERPL CALCULATED.3IONS-SCNC: 10 MEQ/L (ref 7–13)
AST SERPL-CCNC: 15 U/L (ref 0–35)
BARBITURATE SCREEN URINE: NORMAL
BASOPHILS ABSOLUTE: 0 K/UL (ref 0–0.2)
BASOPHILS RELATIVE PERCENT: 0.6 %
BENZODIAZEPINE SCREEN, URINE: NORMAL
BILIRUB SERPL-MCNC: <0.2 MG/DL (ref 0–1.2)
BILIRUBIN URINE: NEGATIVE
BLOOD, URINE: NEGATIVE
BUN BLDV-MCNC: 12 MG/DL (ref 6–20)
CALCIUM SERPL-MCNC: 8.7 MG/DL (ref 8.6–10.2)
CANNABINOID SCREEN URINE: NORMAL
CHLORIDE BLD-SCNC: 107 MEQ/L (ref 98–107)
CLARITY: CLEAR
CO2: 26 MEQ/L (ref 22–29)
COCAINE METABOLITE SCREEN URINE: NORMAL
COLOR: YELLOW
CREAT SERPL-MCNC: 0.42 MG/DL (ref 0.5–0.9)
EOSINOPHILS ABSOLUTE: 0.3 K/UL (ref 0–0.7)
EOSINOPHILS RELATIVE PERCENT: 4.1 %
ETHANOL PERCENT: NORMAL G/DL
ETHANOL: <10 MG/DL (ref 0–0.08)
GFR AFRICAN AMERICAN: >60
GFR NON-AFRICAN AMERICAN: >60
GLOBULIN: 2.4 G/DL (ref 2.3–3.5)
GLUCOSE BLD-MCNC: 98 MG/DL (ref 74–109)
GLUCOSE URINE: NEGATIVE MG/DL
HCG(URINE) PREGNANCY TEST: NEGATIVE
HCT VFR BLD CALC: 42.1 % (ref 37–47)
HEMOGLOBIN: 14.8 G/DL (ref 12–16)
KETONES, URINE: NEGATIVE MG/DL
LEUKOCYTE ESTERASE, URINE: NEGATIVE
LYMPHOCYTES ABSOLUTE: 2.2 K/UL (ref 1–4.8)
LYMPHOCYTES RELATIVE PERCENT: 32.1 %
Lab: NORMAL
MCH RBC QN AUTO: 35.3 PG (ref 27–31.3)
MCHC RBC AUTO-ENTMCNC: 35.2 % (ref 33–37)
MCV RBC AUTO: 100.1 FL (ref 82–100)
MONOCYTES ABSOLUTE: 0.5 K/UL (ref 0.2–0.8)
MONOCYTES RELATIVE PERCENT: 7.5 %
NEUTROPHILS ABSOLUTE: 3.8 K/UL (ref 1.4–6.5)
NEUTROPHILS RELATIVE PERCENT: 55.7 %
NITRITE, URINE: NEGATIVE
OPIATE SCREEN URINE: NORMAL
PDW BLD-RTO: 12.7 % (ref 11.5–14.5)
PH UA: 7 (ref 5–9)
PHENCYCLIDINE SCREEN URINE: NORMAL
PLATELET # BLD: 178 K/UL (ref 130–400)
POTASSIUM SERPL-SCNC: 4 MEQ/L (ref 3.5–5.1)
PROTEIN UA: NEGATIVE MG/DL
RBC # BLD: 4.2 M/UL (ref 4.2–5.4)
SALICYLATE, SERUM: <0.3 MG/DL (ref 15–30)
SODIUM BLD-SCNC: 143 MEQ/L (ref 132–144)
SPECIFIC GRAVITY UA: 1.02 (ref 1–1.03)
TOTAL CK: 102 U/L (ref 0–170)
TOTAL PROTEIN: 6.3 G/DL (ref 6.4–8.1)
TSH SERPL DL<=0.05 MIU/L-ACNC: 0.94 UIU/ML (ref 0.27–4.2)
URINE REFLEX TO CULTURE: NORMAL
UROBILINOGEN, URINE: 0.2 E.U./DL
WBC # BLD: 6.8 K/UL (ref 4.8–10.8)

## 2019-01-14 PROCEDURE — 80053 COMPREHEN METABOLIC PANEL: CPT

## 2019-01-14 PROCEDURE — 82550 ASSAY OF CK (CPK): CPT

## 2019-01-14 PROCEDURE — 85025 COMPLETE CBC W/AUTO DIFF WBC: CPT

## 2019-01-14 PROCEDURE — 71045 X-RAY EXAM CHEST 1 VIEW: CPT

## 2019-01-14 PROCEDURE — 80307 DRUG TEST PRSMV CHEM ANLYZR: CPT

## 2019-01-14 PROCEDURE — 36415 COLL VENOUS BLD VENIPUNCTURE: CPT

## 2019-01-14 PROCEDURE — 80185 ASSAY OF PHENYTOIN TOTAL: CPT

## 2019-01-14 PROCEDURE — G0480 DRUG TEST DEF 1-7 CLASSES: HCPCS

## 2019-01-14 PROCEDURE — 99285 EMERGENCY DEPT VISIT HI MDM: CPT

## 2019-01-14 PROCEDURE — 84443 ASSAY THYROID STIM HORMONE: CPT

## 2019-01-14 PROCEDURE — 70450 CT HEAD/BRAIN W/O DYE: CPT

## 2019-01-14 PROCEDURE — 84703 CHORIONIC GONADOTROPIN ASSAY: CPT

## 2019-01-14 PROCEDURE — 93005 ELECTROCARDIOGRAM TRACING: CPT

## 2019-01-14 PROCEDURE — 6370000000 HC RX 637 (ALT 250 FOR IP): Performed by: STUDENT IN AN ORGANIZED HEALTH CARE EDUCATION/TRAINING PROGRAM

## 2019-01-14 PROCEDURE — 81003 URINALYSIS AUTO W/O SCOPE: CPT

## 2019-01-14 RX ORDER — BISACODYL 10 MG
10 SUPPOSITORY, RECTAL RECTAL DAILY PRN
Status: ON HOLD | COMMUNITY
End: 2019-01-22 | Stop reason: HOSPADM

## 2019-01-14 RX ORDER — OXYCODONE HYDROCHLORIDE 5 MG/1
5 TABLET ORAL ONCE
Status: COMPLETED | OUTPATIENT
Start: 2019-01-14 | End: 2019-01-14

## 2019-01-14 RX ORDER — PHENYTOIN SODIUM 100 MG/1
200 CAPSULE, EXTENDED RELEASE ORAL 2 TIMES DAILY
COMMUNITY

## 2019-01-14 RX ORDER — DOCUSATE SODIUM 100 MG/1
100 CAPSULE, LIQUID FILLED ORAL EVERY MORNING
COMMUNITY

## 2019-01-14 RX ORDER — ACETAMINOPHEN 500 MG
1000 TABLET ORAL ONCE
Status: COMPLETED | OUTPATIENT
Start: 2019-01-14 | End: 2019-01-14

## 2019-01-14 RX ORDER — ALBUTEROL SULFATE 90 UG/1
2 AEROSOL, METERED RESPIRATORY (INHALATION) EVERY 8 HOURS PRN
Status: ON HOLD | COMMUNITY
End: 2019-06-07

## 2019-01-14 RX ORDER — TRAZODONE HYDROCHLORIDE 50 MG/1
50 TABLET ORAL NIGHTLY
COMMUNITY

## 2019-01-14 RX ADMIN — OXYCODONE HYDROCHLORIDE 5 MG: 5 TABLET ORAL at 20:16

## 2019-01-14 RX ADMIN — ACETAMINOPHEN 1000 MG: 500 TABLET ORAL at 22:15

## 2019-01-14 ASSESSMENT — PAIN SCALES - GENERAL
PAINLEVEL_OUTOF10: 10

## 2019-01-14 ASSESSMENT — ENCOUNTER SYMPTOMS
CHEST TIGHTNESS: 0
TROUBLE SWALLOWING: 0
COUGH: 0
ABDOMINAL PAIN: 0
DIARRHEA: 0
BACK PAIN: 0
VOMITING: 0
SINUS PRESSURE: 0
SHORTNESS OF BREATH: 0

## 2019-01-14 ASSESSMENT — PAIN DESCRIPTION - DESCRIPTORS: DESCRIPTORS: SHOOTING

## 2019-01-14 ASSESSMENT — PAIN DESCRIPTION - FREQUENCY: FREQUENCY: CONTINUOUS

## 2019-01-14 ASSESSMENT — PAIN DESCRIPTION - ORIENTATION: ORIENTATION: LEFT

## 2019-01-14 ASSESSMENT — PAIN DESCRIPTION - LOCATION: LOCATION: LEG

## 2019-01-14 ASSESSMENT — PAIN DESCRIPTION - PAIN TYPE: TYPE: CHRONIC PAIN

## 2019-01-15 PROBLEM — F03.918 DEMENTIA WITH AGGRESSIVE BEHAVIOR: Status: ACTIVE | Noted: 2019-01-15

## 2019-01-15 LAB
GLUCOSE BLD-MCNC: 94 MG/DL (ref 60–115)
PERFORMED ON: NORMAL
PHENYTOIN LEVEL: 21.8 UG/ML (ref 10–20)

## 2019-01-15 PROCEDURE — 99222 1ST HOSP IP/OBS MODERATE 55: CPT | Performed by: PSYCHIATRY & NEUROLOGY

## 2019-01-15 PROCEDURE — 6370000000 HC RX 637 (ALT 250 FOR IP): Performed by: PSYCHIATRY & NEUROLOGY

## 2019-01-15 PROCEDURE — 1240000000 HC EMOTIONAL WELLNESS R&B

## 2019-01-15 PROCEDURE — 94664 DEMO&/EVAL PT USE INHALER: CPT

## 2019-01-15 RX ORDER — ISOSORBIDE MONONITRATE 30 MG/1
30 TABLET, EXTENDED RELEASE ORAL DAILY
Status: DISCONTINUED | OUTPATIENT
Start: 2019-01-15 | End: 2019-01-22 | Stop reason: HOSPADM

## 2019-01-15 RX ORDER — METOPROLOL SUCCINATE 25 MG/1
25 TABLET, EXTENDED RELEASE ORAL DAILY
Status: DISCONTINUED | OUTPATIENT
Start: 2019-01-15 | End: 2019-01-22 | Stop reason: HOSPADM

## 2019-01-15 RX ORDER — HALOPERIDOL 5 MG/ML
5 INJECTION INTRAMUSCULAR EVERY 4 HOURS PRN
Status: DISCONTINUED | OUTPATIENT
Start: 2019-01-15 | End: 2019-01-22 | Stop reason: HOSPADM

## 2019-01-15 RX ORDER — ALBUTEROL SULFATE 90 UG/1
2 AEROSOL, METERED RESPIRATORY (INHALATION) EVERY 8 HOURS PRN
Status: DISCONTINUED | OUTPATIENT
Start: 2019-01-15 | End: 2019-01-22 | Stop reason: HOSPADM

## 2019-01-15 RX ORDER — ASPIRIN 81 MG/1
81 TABLET, CHEWABLE ORAL DAILY
Status: DISCONTINUED | OUTPATIENT
Start: 2019-01-15 | End: 2019-01-22 | Stop reason: HOSPADM

## 2019-01-15 RX ORDER — FUROSEMIDE 20 MG/1
20 TABLET ORAL DAILY
Status: DISCONTINUED | OUTPATIENT
Start: 2019-01-15 | End: 2019-01-22 | Stop reason: HOSPADM

## 2019-01-15 RX ORDER — DOCUSATE SODIUM 100 MG/1
100 CAPSULE, LIQUID FILLED ORAL EVERY MORNING
Status: DISCONTINUED | OUTPATIENT
Start: 2019-01-16 | End: 2019-01-22 | Stop reason: HOSPADM

## 2019-01-15 RX ORDER — BENZTROPINE MESYLATE 1 MG/ML
2 INJECTION INTRAMUSCULAR; INTRAVENOUS 2 TIMES DAILY PRN
Status: DISCONTINUED | OUTPATIENT
Start: 2019-01-15 | End: 2019-01-22 | Stop reason: HOSPADM

## 2019-01-15 RX ORDER — ACETAMINOPHEN 325 MG/1
650 TABLET ORAL EVERY 4 HOURS PRN
Status: DISCONTINUED | OUTPATIENT
Start: 2019-01-15 | End: 2019-01-22 | Stop reason: HOSPADM

## 2019-01-15 RX ORDER — ATORVASTATIN CALCIUM 10 MG/1
10 TABLET, FILM COATED ORAL NIGHTLY
Status: DISCONTINUED | OUTPATIENT
Start: 2019-01-15 | End: 2019-01-22 | Stop reason: HOSPADM

## 2019-01-15 RX ORDER — HYDROXYZINE PAMOATE 50 MG/1
50 CAPSULE ORAL EVERY 6 HOURS PRN
Status: DISCONTINUED | OUTPATIENT
Start: 2019-01-15 | End: 2019-01-22 | Stop reason: HOSPADM

## 2019-01-15 RX ORDER — TRAZODONE HYDROCHLORIDE 50 MG/1
50 TABLET ORAL NIGHTLY PRN
Status: DISCONTINUED | OUTPATIENT
Start: 2019-01-15 | End: 2019-01-22 | Stop reason: HOSPADM

## 2019-01-15 RX ORDER — DULOXETIN HYDROCHLORIDE 60 MG/1
60 CAPSULE, DELAYED RELEASE ORAL DAILY
Status: DISCONTINUED | OUTPATIENT
Start: 2019-01-15 | End: 2019-01-17

## 2019-01-15 RX ORDER — PHENYTOIN SODIUM 100 MG/1
200 CAPSULE, EXTENDED RELEASE ORAL 2 TIMES DAILY
Status: DISCONTINUED | OUTPATIENT
Start: 2019-01-15 | End: 2019-01-22 | Stop reason: HOSPADM

## 2019-01-15 RX ORDER — ZONISAMIDE 100 MG/1
100 CAPSULE ORAL DAILY
Status: DISCONTINUED | OUTPATIENT
Start: 2019-01-15 | End: 2019-01-22 | Stop reason: HOSPADM

## 2019-01-15 RX ORDER — MAGNESIUM HYDROXIDE/ALUMINUM HYDROXICE/SIMETHICONE 120; 1200; 1200 MG/30ML; MG/30ML; MG/30ML
30 SUSPENSION ORAL PRN
Status: DISCONTINUED | OUTPATIENT
Start: 2019-01-15 | End: 2019-01-22 | Stop reason: HOSPADM

## 2019-01-15 RX ORDER — PHENYTOIN SODIUM 100 MG/1
100 CAPSULE, EXTENDED RELEASE ORAL
Status: DISCONTINUED | OUTPATIENT
Start: 2019-01-15 | End: 2019-01-22 | Stop reason: HOSPADM

## 2019-01-15 RX ORDER — TRAZODONE HYDROCHLORIDE 50 MG/1
50 TABLET ORAL NIGHTLY
Status: DISCONTINUED | OUTPATIENT
Start: 2019-01-15 | End: 2019-01-22 | Stop reason: HOSPADM

## 2019-01-15 RX ADMIN — FUROSEMIDE 20 MG: 20 TABLET ORAL at 14:09

## 2019-01-15 RX ADMIN — ACETAMINOPHEN 650 MG: 325 TABLET ORAL at 14:08

## 2019-01-15 RX ADMIN — HYDROXYZINE PAMOATE 50 MG: 50 CAPSULE ORAL at 19:31

## 2019-01-15 RX ADMIN — PHENYTOIN SODIUM 200 MG: 100 CAPSULE ORAL at 20:40

## 2019-01-15 RX ADMIN — ZONISAMIDE 100 MG: 100 CAPSULE ORAL at 14:09

## 2019-01-15 RX ADMIN — ATORVASTATIN CALCIUM 10 MG: 10 TABLET, FILM COATED ORAL at 20:40

## 2019-01-15 RX ADMIN — ASPIRIN 81 MG 81 MG: 81 TABLET ORAL at 14:09

## 2019-01-15 RX ADMIN — PHENYTOIN SODIUM 100 MG: 100 CAPSULE ORAL at 14:09

## 2019-01-15 RX ADMIN — ISOSORBIDE MONONITRATE 30 MG: 30 TABLET, EXTENDED RELEASE ORAL at 14:18

## 2019-01-15 RX ADMIN — ACETAMINOPHEN 650 MG: 325 TABLET ORAL at 19:31

## 2019-01-15 RX ADMIN — METOPROLOL SUCCINATE 25 MG: 25 TABLET, EXTENDED RELEASE ORAL at 14:09

## 2019-01-15 RX ADMIN — DULOXETINE 60 MG: 60 CAPSULE, DELAYED RELEASE ORAL at 14:10

## 2019-01-15 ASSESSMENT — PAIN SCALES - GENERAL: PAINLEVEL_OUTOF10: 10

## 2019-01-15 ASSESSMENT — SLEEP AND FATIGUE QUESTIONNAIRES
AVERAGE NUMBER OF SLEEP HOURS: 7
DIFFICULTY FALLING ASLEEP: YES
DIFFICULTY STAYING ASLEEP: YES
DO YOU USE A SLEEP AID: YES
DO YOU HAVE DIFFICULTY SLEEPING: YES

## 2019-01-16 PROCEDURE — 99232 SBSQ HOSP IP/OBS MODERATE 35: CPT | Performed by: PSYCHIATRY & NEUROLOGY

## 2019-01-16 PROCEDURE — 6370000000 HC RX 637 (ALT 250 FOR IP): Performed by: PSYCHIATRY & NEUROLOGY

## 2019-01-16 PROCEDURE — 6370000000 HC RX 637 (ALT 250 FOR IP): Performed by: PHYSICIAN ASSISTANT

## 2019-01-16 PROCEDURE — 1240000000 HC EMOTIONAL WELLNESS R&B

## 2019-01-16 RX ORDER — RISPERIDONE 0.5 MG/1
0.25 TABLET, FILM COATED ORAL DAILY
Status: DISCONTINUED | OUTPATIENT
Start: 2019-01-16 | End: 2019-01-19

## 2019-01-16 RX ORDER — IBUPROFEN 600 MG/1
600 TABLET ORAL EVERY 6 HOURS PRN
Status: DISCONTINUED | OUTPATIENT
Start: 2019-01-16 | End: 2019-01-22 | Stop reason: HOSPADM

## 2019-01-16 RX ADMIN — ATORVASTATIN CALCIUM 10 MG: 10 TABLET, FILM COATED ORAL at 20:26

## 2019-01-16 RX ADMIN — IBUPROFEN 600 MG: 600 TABLET ORAL at 12:45

## 2019-01-16 RX ADMIN — PHENYTOIN SODIUM 200 MG: 100 CAPSULE ORAL at 08:45

## 2019-01-16 RX ADMIN — FUROSEMIDE 20 MG: 20 TABLET ORAL at 08:44

## 2019-01-16 RX ADMIN — TRAZODONE HYDROCHLORIDE 50 MG: 50 TABLET ORAL at 20:25

## 2019-01-16 RX ADMIN — PHENYTOIN SODIUM 100 MG: 100 CAPSULE ORAL at 12:03

## 2019-01-16 RX ADMIN — METOPROLOL SUCCINATE 25 MG: 25 TABLET, EXTENDED RELEASE ORAL at 08:44

## 2019-01-16 RX ADMIN — ISOSORBIDE MONONITRATE 30 MG: 30 TABLET, EXTENDED RELEASE ORAL at 08:44

## 2019-01-16 RX ADMIN — RISPERIDONE 0.25 MG: 0.5 TABLET ORAL at 09:54

## 2019-01-16 RX ADMIN — IBUPROFEN 600 MG: 600 TABLET ORAL at 21:36

## 2019-01-16 RX ADMIN — DULOXETINE 60 MG: 60 CAPSULE, DELAYED RELEASE ORAL at 08:44

## 2019-01-16 RX ADMIN — DOCUSATE SODIUM 100 MG: 100 CAPSULE, LIQUID FILLED ORAL at 08:44

## 2019-01-16 RX ADMIN — ASPIRIN 81 MG 81 MG: 81 TABLET ORAL at 08:44

## 2019-01-16 RX ADMIN — PHENYTOIN SODIUM 200 MG: 100 CAPSULE ORAL at 20:25

## 2019-01-16 RX ADMIN — ZONISAMIDE 100 MG: 100 CAPSULE ORAL at 08:44

## 2019-01-16 ASSESSMENT — LIFESTYLE VARIABLES: HISTORY_ALCOHOL_USE: NO

## 2019-01-16 ASSESSMENT — PAIN SCALES - GENERAL
PAINLEVEL_OUTOF10: 8
PAINLEVEL_OUTOF10: 8

## 2019-01-17 LAB
EKG ATRIAL RATE: 70 BPM
EKG ATRIAL RATE: 75 BPM
EKG P AXIS: 61 DEGREES
EKG P AXIS: 64 DEGREES
EKG P-R INTERVAL: 188 MS
EKG P-R INTERVAL: 198 MS
EKG Q-T INTERVAL: 444 MS
EKG Q-T INTERVAL: 464 MS
EKG QRS DURATION: 150 MS
EKG QRS DURATION: 154 MS
EKG QTC CALCULATION (BAZETT): 479 MS
EKG QTC CALCULATION (BAZETT): 518 MS
EKG R AXIS: -52 DEGREES
EKG R AXIS: -60 DEGREES
EKG T AXIS: 93 DEGREES
EKG T AXIS: 94 DEGREES
EKG VENTRICULAR RATE: 70 BPM
EKG VENTRICULAR RATE: 75 BPM

## 2019-01-17 PROCEDURE — 93000 ELECTROCARDIOGRAM COMPLETE: CPT | Performed by: PSYCHIATRY & NEUROLOGY

## 2019-01-17 PROCEDURE — 93005 ELECTROCARDIOGRAM TRACING: CPT

## 2019-01-17 PROCEDURE — 1240000000 HC EMOTIONAL WELLNESS R&B

## 2019-01-17 PROCEDURE — 99232 SBSQ HOSP IP/OBS MODERATE 35: CPT | Performed by: PSYCHIATRY & NEUROLOGY

## 2019-01-17 PROCEDURE — 6370000000 HC RX 637 (ALT 250 FOR IP): Performed by: PHYSICIAN ASSISTANT

## 2019-01-17 PROCEDURE — 6370000000 HC RX 637 (ALT 250 FOR IP): Performed by: PSYCHIATRY & NEUROLOGY

## 2019-01-17 RX ORDER — DULOXETIN HYDROCHLORIDE 30 MG/1
30 CAPSULE, DELAYED RELEASE ORAL DAILY
Status: DISCONTINUED | OUTPATIENT
Start: 2019-01-18 | End: 2019-01-22 | Stop reason: HOSPADM

## 2019-01-17 RX ADMIN — PHENYTOIN SODIUM 100 MG: 100 CAPSULE ORAL at 12:01

## 2019-01-17 RX ADMIN — ZONISAMIDE 100 MG: 100 CAPSULE ORAL at 08:37

## 2019-01-17 RX ADMIN — ATORVASTATIN CALCIUM 10 MG: 10 TABLET, FILM COATED ORAL at 21:14

## 2019-01-17 RX ADMIN — ISOSORBIDE MONONITRATE 30 MG: 30 TABLET, EXTENDED RELEASE ORAL at 08:37

## 2019-01-17 RX ADMIN — PHENYTOIN SODIUM 200 MG: 100 CAPSULE ORAL at 08:37

## 2019-01-17 RX ADMIN — METOPROLOL SUCCINATE 25 MG: 25 TABLET, EXTENDED RELEASE ORAL at 08:37

## 2019-01-17 RX ADMIN — ASPIRIN 81 MG 81 MG: 81 TABLET ORAL at 08:38

## 2019-01-17 RX ADMIN — TRAZODONE HYDROCHLORIDE 50 MG: 50 TABLET ORAL at 21:14

## 2019-01-17 RX ADMIN — RISPERIDONE 0.25 MG: 0.5 TABLET ORAL at 08:37

## 2019-01-17 RX ADMIN — PHENYTOIN SODIUM 200 MG: 100 CAPSULE ORAL at 21:14

## 2019-01-17 RX ADMIN — DOCUSATE SODIUM 100 MG: 100 CAPSULE, LIQUID FILLED ORAL at 08:37

## 2019-01-17 RX ADMIN — ACETAMINOPHEN 650 MG: 325 TABLET ORAL at 08:37

## 2019-01-17 RX ADMIN — DULOXETINE 60 MG: 60 CAPSULE, DELAYED RELEASE ORAL at 08:38

## 2019-01-17 RX ADMIN — FUROSEMIDE 20 MG: 20 TABLET ORAL at 08:37

## 2019-01-17 RX ADMIN — IBUPROFEN 600 MG: 600 TABLET ORAL at 12:03

## 2019-01-17 ASSESSMENT — PAIN SCALES - GENERAL
PAINLEVEL_OUTOF10: 5
PAINLEVEL_OUTOF10: 6
PAINLEVEL_OUTOF10: 7

## 2019-01-18 PROCEDURE — 6370000000 HC RX 637 (ALT 250 FOR IP): Performed by: PHYSICIAN ASSISTANT

## 2019-01-18 PROCEDURE — 6370000000 HC RX 637 (ALT 250 FOR IP): Performed by: PSYCHIATRY & NEUROLOGY

## 2019-01-18 PROCEDURE — 1240000000 HC EMOTIONAL WELLNESS R&B

## 2019-01-18 PROCEDURE — 99232 SBSQ HOSP IP/OBS MODERATE 35: CPT | Performed by: PSYCHIATRY & NEUROLOGY

## 2019-01-18 RX ADMIN — RISPERIDONE 0.25 MG: 0.5 TABLET ORAL at 08:57

## 2019-01-18 RX ADMIN — ATORVASTATIN CALCIUM 10 MG: 10 TABLET, FILM COATED ORAL at 20:02

## 2019-01-18 RX ADMIN — PHENYTOIN SODIUM 200 MG: 100 CAPSULE ORAL at 08:56

## 2019-01-18 RX ADMIN — PHENYTOIN SODIUM 200 MG: 100 CAPSULE ORAL at 20:02

## 2019-01-18 RX ADMIN — METOPROLOL SUCCINATE 25 MG: 25 TABLET, EXTENDED RELEASE ORAL at 08:57

## 2019-01-18 RX ADMIN — IBUPROFEN 600 MG: 600 TABLET ORAL at 20:02

## 2019-01-18 RX ADMIN — ZONISAMIDE 100 MG: 100 CAPSULE ORAL at 08:56

## 2019-01-18 RX ADMIN — ACETAMINOPHEN 650 MG: 325 TABLET ORAL at 20:02

## 2019-01-18 RX ADMIN — FUROSEMIDE 20 MG: 20 TABLET ORAL at 08:57

## 2019-01-18 RX ADMIN — TRAZODONE HYDROCHLORIDE 50 MG: 50 TABLET ORAL at 20:02

## 2019-01-18 RX ADMIN — ISOSORBIDE MONONITRATE 30 MG: 30 TABLET, EXTENDED RELEASE ORAL at 08:56

## 2019-01-18 RX ADMIN — PHENYTOIN SODIUM 100 MG: 100 CAPSULE ORAL at 13:02

## 2019-01-18 RX ADMIN — ASPIRIN 81 MG 81 MG: 81 TABLET ORAL at 08:56

## 2019-01-18 RX ADMIN — ACETAMINOPHEN 650 MG: 325 TABLET ORAL at 13:04

## 2019-01-18 RX ADMIN — DOCUSATE SODIUM 100 MG: 100 CAPSULE, LIQUID FILLED ORAL at 08:56

## 2019-01-18 RX ADMIN — DULOXETINE HYDROCHLORIDE 30 MG: 30 CAPSULE, DELAYED RELEASE ORAL at 08:57

## 2019-01-18 ASSESSMENT — PAIN SCALES - GENERAL
PAINLEVEL_OUTOF10: 4
PAINLEVEL_OUTOF10: 10
PAINLEVEL_OUTOF10: 3

## 2019-01-19 PROCEDURE — 1240000000 HC EMOTIONAL WELLNESS R&B

## 2019-01-19 PROCEDURE — 6370000000 HC RX 637 (ALT 250 FOR IP): Performed by: PSYCHIATRY & NEUROLOGY

## 2019-01-19 PROCEDURE — 6370000000 HC RX 637 (ALT 250 FOR IP): Performed by: PHYSICIAN ASSISTANT

## 2019-01-19 RX ORDER — ACETAMINOPHEN 80 MG
TABLET,CHEWABLE ORAL ONCE
Status: COMPLETED | OUTPATIENT
Start: 2019-01-19 | End: 2019-01-19

## 2019-01-19 RX ORDER — RISPERIDONE 0.25 MG/1
0.25 TABLET, FILM COATED ORAL 2 TIMES DAILY
Status: DISCONTINUED | OUTPATIENT
Start: 2019-01-19 | End: 2019-01-22 | Stop reason: HOSPADM

## 2019-01-19 RX ADMIN — RISPERIDONE 0.25 MG: 0.25 TABLET, FILM COATED ORAL at 20:44

## 2019-01-19 RX ADMIN — DOCUSATE SODIUM 100 MG: 100 CAPSULE, LIQUID FILLED ORAL at 08:10

## 2019-01-19 RX ADMIN — TRAZODONE HYDROCHLORIDE 50 MG: 50 TABLET ORAL at 20:44

## 2019-01-19 RX ADMIN — RISPERIDONE 0.25 MG: 0.25 TABLET, FILM COATED ORAL at 08:10

## 2019-01-19 RX ADMIN — PHENYTOIN SODIUM 100 MG: 100 CAPSULE ORAL at 12:03

## 2019-01-19 RX ADMIN — PHENYTOIN SODIUM 200 MG: 100 CAPSULE ORAL at 20:44

## 2019-01-19 RX ADMIN — ZONISAMIDE 100 MG: 100 CAPSULE ORAL at 08:10

## 2019-01-19 RX ADMIN — ACETAMINOPHEN 650 MG: 325 TABLET ORAL at 12:16

## 2019-01-19 RX ADMIN — METOPROLOL SUCCINATE 25 MG: 25 TABLET, EXTENDED RELEASE ORAL at 08:10

## 2019-01-19 RX ADMIN — ATORVASTATIN CALCIUM 10 MG: 10 TABLET, FILM COATED ORAL at 20:44

## 2019-01-19 RX ADMIN — DULOXETINE HYDROCHLORIDE 30 MG: 30 CAPSULE, DELAYED RELEASE ORAL at 08:10

## 2019-01-19 RX ADMIN — FUROSEMIDE 20 MG: 20 TABLET ORAL at 08:10

## 2019-01-19 RX ADMIN — ACETAMINOPHEN 650 MG: 325 TABLET ORAL at 20:44

## 2019-01-19 RX ADMIN — ASPIRIN 81 MG 81 MG: 81 TABLET ORAL at 08:10

## 2019-01-19 RX ADMIN — PHENYTOIN SODIUM 200 MG: 100 CAPSULE ORAL at 08:11

## 2019-01-19 RX ADMIN — IBUPROFEN 600 MG: 600 TABLET ORAL at 23:26

## 2019-01-19 RX ADMIN — Medication: at 08:13

## 2019-01-19 RX ADMIN — ISOSORBIDE MONONITRATE 30 MG: 30 TABLET, EXTENDED RELEASE ORAL at 08:10

## 2019-01-19 RX ADMIN — IBUPROFEN 600 MG: 600 TABLET ORAL at 17:06

## 2019-01-19 ASSESSMENT — PAIN SCALES - GENERAL
PAINLEVEL_OUTOF10: 10

## 2019-01-20 PROCEDURE — 6370000000 HC RX 637 (ALT 250 FOR IP): Performed by: PSYCHIATRY & NEUROLOGY

## 2019-01-20 PROCEDURE — 1240000000 HC EMOTIONAL WELLNESS R&B

## 2019-01-20 PROCEDURE — 6370000000 HC RX 637 (ALT 250 FOR IP): Performed by: PHYSICIAN ASSISTANT

## 2019-01-20 RX ADMIN — FUROSEMIDE 20 MG: 20 TABLET ORAL at 08:18

## 2019-01-20 RX ADMIN — ASPIRIN 81 MG 81 MG: 81 TABLET ORAL at 08:19

## 2019-01-20 RX ADMIN — ISOSORBIDE MONONITRATE 30 MG: 30 TABLET, EXTENDED RELEASE ORAL at 08:18

## 2019-01-20 RX ADMIN — IBUPROFEN 600 MG: 600 TABLET ORAL at 23:28

## 2019-01-20 RX ADMIN — ACETAMINOPHEN 650 MG: 325 TABLET ORAL at 00:59

## 2019-01-20 RX ADMIN — ATORVASTATIN CALCIUM 10 MG: 10 TABLET, FILM COATED ORAL at 20:30

## 2019-01-20 RX ADMIN — PHENYTOIN SODIUM 200 MG: 100 CAPSULE ORAL at 08:20

## 2019-01-20 RX ADMIN — METOPROLOL SUCCINATE 25 MG: 25 TABLET, EXTENDED RELEASE ORAL at 08:19

## 2019-01-20 RX ADMIN — DULOXETINE HYDROCHLORIDE 30 MG: 30 CAPSULE, DELAYED RELEASE ORAL at 08:18

## 2019-01-20 RX ADMIN — RISPERIDONE 0.25 MG: 0.25 TABLET, FILM COATED ORAL at 08:18

## 2019-01-20 RX ADMIN — RISPERIDONE 0.25 MG: 0.25 TABLET, FILM COATED ORAL at 20:30

## 2019-01-20 RX ADMIN — PHENYTOIN SODIUM 200 MG: 100 CAPSULE ORAL at 20:29

## 2019-01-20 RX ADMIN — TRAZODONE HYDROCHLORIDE 50 MG: 50 TABLET ORAL at 20:29

## 2019-01-20 RX ADMIN — ACETAMINOPHEN 650 MG: 325 TABLET ORAL at 23:28

## 2019-01-20 RX ADMIN — PHENYTOIN SODIUM 100 MG: 100 CAPSULE ORAL at 13:23

## 2019-01-20 RX ADMIN — DOCUSATE SODIUM 100 MG: 100 CAPSULE, LIQUID FILLED ORAL at 08:20

## 2019-01-20 RX ADMIN — ZONISAMIDE 100 MG: 100 CAPSULE ORAL at 08:19

## 2019-01-20 ASSESSMENT — PAIN SCALES - GENERAL
PAINLEVEL_OUTOF10: 10

## 2019-01-21 PROCEDURE — 6370000000 HC RX 637 (ALT 250 FOR IP): Performed by: PSYCHIATRY & NEUROLOGY

## 2019-01-21 PROCEDURE — 1240000000 HC EMOTIONAL WELLNESS R&B

## 2019-01-21 PROCEDURE — 6370000000 HC RX 637 (ALT 250 FOR IP): Performed by: PHYSICIAN ASSISTANT

## 2019-01-21 PROCEDURE — 6370000000 HC RX 637 (ALT 250 FOR IP): Performed by: SPECIALIST

## 2019-01-21 PROCEDURE — 99231 SBSQ HOSP IP/OBS SF/LOW 25: CPT | Performed by: PSYCHIATRY & NEUROLOGY

## 2019-01-21 RX ORDER — GABAPENTIN 300 MG/1
300 CAPSULE ORAL 3 TIMES DAILY PRN
Status: DISCONTINUED | OUTPATIENT
Start: 2019-01-21 | End: 2019-01-22 | Stop reason: HOSPADM

## 2019-01-21 RX ADMIN — GABAPENTIN 300 MG: 300 CAPSULE ORAL at 16:36

## 2019-01-21 RX ADMIN — PHENYTOIN SODIUM 200 MG: 100 CAPSULE ORAL at 08:19

## 2019-01-21 RX ADMIN — DOCUSATE SODIUM 100 MG: 100 CAPSULE, LIQUID FILLED ORAL at 08:18

## 2019-01-21 RX ADMIN — TRAZODONE HYDROCHLORIDE 50 MG: 50 TABLET ORAL at 21:07

## 2019-01-21 RX ADMIN — METOPROLOL SUCCINATE 25 MG: 25 TABLET, EXTENDED RELEASE ORAL at 08:19

## 2019-01-21 RX ADMIN — GABAPENTIN 300 MG: 300 CAPSULE ORAL at 21:06

## 2019-01-21 RX ADMIN — PHENYTOIN SODIUM 100 MG: 100 CAPSULE ORAL at 12:47

## 2019-01-21 RX ADMIN — PHENYTOIN SODIUM 200 MG: 100 CAPSULE ORAL at 21:06

## 2019-01-21 RX ADMIN — FUROSEMIDE 20 MG: 20 TABLET ORAL at 08:19

## 2019-01-21 RX ADMIN — ASPIRIN 81 MG 81 MG: 81 TABLET ORAL at 08:20

## 2019-01-21 RX ADMIN — DULOXETINE HYDROCHLORIDE 30 MG: 30 CAPSULE, DELAYED RELEASE ORAL at 08:18

## 2019-01-21 RX ADMIN — IBUPROFEN 600 MG: 600 TABLET ORAL at 08:18

## 2019-01-21 RX ADMIN — RISPERIDONE 0.25 MG: 0.25 TABLET, FILM COATED ORAL at 08:18

## 2019-01-21 RX ADMIN — RISPERIDONE 0.25 MG: 0.25 TABLET, FILM COATED ORAL at 21:07

## 2019-01-21 RX ADMIN — ATORVASTATIN CALCIUM 10 MG: 10 TABLET, FILM COATED ORAL at 21:07

## 2019-01-21 RX ADMIN — ZONISAMIDE 100 MG: 100 CAPSULE ORAL at 08:19

## 2019-01-21 RX ADMIN — ISOSORBIDE MONONITRATE 30 MG: 30 TABLET, EXTENDED RELEASE ORAL at 08:19

## 2019-01-21 ASSESSMENT — PAIN SCALES - GENERAL: PAINLEVEL_OUTOF10: 4

## 2019-01-22 VITALS
BODY MASS INDEX: 35.44 KG/M2 | OXYGEN SATURATION: 99 % | SYSTOLIC BLOOD PRESSURE: 144 MMHG | TEMPERATURE: 97 F | RESPIRATION RATE: 18 BRPM | HEIGHT: 63 IN | HEART RATE: 63 BPM | WEIGHT: 200 LBS | DIASTOLIC BLOOD PRESSURE: 60 MMHG

## 2019-01-22 PROCEDURE — 99239 HOSP IP/OBS DSCHRG MGMT >30: CPT | Performed by: PSYCHIATRY & NEUROLOGY

## 2019-01-22 PROCEDURE — 6370000000 HC RX 637 (ALT 250 FOR IP): Performed by: PHYSICIAN ASSISTANT

## 2019-01-22 PROCEDURE — 6370000000 HC RX 637 (ALT 250 FOR IP): Performed by: PSYCHIATRY & NEUROLOGY

## 2019-01-22 PROCEDURE — 6370000000 HC RX 637 (ALT 250 FOR IP): Performed by: SPECIALIST

## 2019-01-22 RX ORDER — RISPERIDONE 0.25 MG/1
0.25 TABLET, FILM COATED ORAL 2 TIMES DAILY
Qty: 60 TABLET | Refills: 3 | Status: ON HOLD
Start: 2019-01-22 | End: 2019-06-07

## 2019-01-22 RX ORDER — DULOXETIN HYDROCHLORIDE 30 MG/1
30 CAPSULE, DELAYED RELEASE ORAL DAILY
Qty: 30 CAPSULE | Refills: 3
Start: 2019-01-23

## 2019-01-22 RX ADMIN — DOCUSATE SODIUM 100 MG: 100 CAPSULE, LIQUID FILLED ORAL at 08:47

## 2019-01-22 RX ADMIN — GABAPENTIN 300 MG: 300 CAPSULE ORAL at 08:47

## 2019-01-22 RX ADMIN — ISOSORBIDE MONONITRATE 30 MG: 30 TABLET, EXTENDED RELEASE ORAL at 08:47

## 2019-01-22 RX ADMIN — PHENYTOIN SODIUM 100 MG: 100 CAPSULE ORAL at 12:14

## 2019-01-22 RX ADMIN — FUROSEMIDE 20 MG: 20 TABLET ORAL at 08:47

## 2019-01-22 RX ADMIN — ZONISAMIDE 100 MG: 100 CAPSULE ORAL at 08:47

## 2019-01-22 RX ADMIN — DULOXETINE HYDROCHLORIDE 30 MG: 30 CAPSULE, DELAYED RELEASE ORAL at 08:47

## 2019-01-22 RX ADMIN — RISPERIDONE 0.25 MG: 0.25 TABLET, FILM COATED ORAL at 08:47

## 2019-01-22 RX ADMIN — ASPIRIN 81 MG 81 MG: 81 TABLET ORAL at 08:47

## 2019-01-22 RX ADMIN — PHENYTOIN SODIUM 200 MG: 100 CAPSULE ORAL at 08:47

## 2019-01-22 RX ADMIN — IBUPROFEN 600 MG: 600 TABLET ORAL at 16:09

## 2019-01-22 RX ADMIN — METOPROLOL SUCCINATE 25 MG: 25 TABLET, EXTENDED RELEASE ORAL at 08:47

## 2019-01-22 RX ADMIN — IBUPROFEN 600 MG: 600 TABLET ORAL at 08:49

## 2019-01-22 ASSESSMENT — PAIN SCALES - GENERAL
PAINLEVEL_OUTOF10: 10
PAINLEVEL_OUTOF10: 4

## 2019-06-07 ENCOUNTER — APPOINTMENT (OUTPATIENT)
Dept: ULTRASOUND IMAGING | Age: 59
DRG: 950 | End: 2019-06-07
Payer: MEDICAID

## 2019-06-07 ENCOUNTER — HOSPITAL ENCOUNTER (INPATIENT)
Age: 59
LOS: 7 days | Discharge: NURSING, REHAB OR LONG-TERM CARE | DRG: 950 | End: 2019-06-14
Attending: EMERGENCY MEDICINE | Admitting: INTERNAL MEDICINE
Payer: MEDICAID

## 2019-06-07 DIAGNOSIS — L03.119 CELLULITIS AND ABSCESS OF LEG: ICD-10-CM

## 2019-06-07 DIAGNOSIS — L08.9 WOUND INFECTION: ICD-10-CM

## 2019-06-07 DIAGNOSIS — L02.419 CELLULITIS AND ABSCESS OF LEG: ICD-10-CM

## 2019-06-07 DIAGNOSIS — T14.8XXA WOUND INFECTION: ICD-10-CM

## 2019-06-07 DIAGNOSIS — F41.9 ANXIETY: ICD-10-CM

## 2019-06-07 DIAGNOSIS — S81.801D LEG WOUND, RIGHT, SUBSEQUENT ENCOUNTER: Primary | ICD-10-CM

## 2019-06-07 LAB
ALBUMIN SERPL-MCNC: 3.8 G/DL (ref 3.5–4.6)
ALP BLD-CCNC: 110 U/L (ref 40–130)
ALT SERPL-CCNC: 14 U/L (ref 0–33)
ANION GAP SERPL CALCULATED.3IONS-SCNC: 11 MEQ/L (ref 9–15)
AST SERPL-CCNC: 26 U/L (ref 0–35)
BILIRUB SERPL-MCNC: <0.2 MG/DL (ref 0.2–0.7)
BUN BLDV-MCNC: 12 MG/DL (ref 6–20)
CALCIUM SERPL-MCNC: 9.3 MG/DL (ref 8.5–9.9)
CHLORIDE BLD-SCNC: 102 MEQ/L (ref 95–107)
CHP ED QC CHECK: NORMAL
CO2: 25 MEQ/L (ref 20–31)
CREAT SERPL-MCNC: 0.42 MG/DL (ref 0.5–0.9)
GFR AFRICAN AMERICAN: >60
GFR NON-AFRICAN AMERICAN: >60
GLOBULIN: 3.2 G/DL (ref 2.3–3.5)
GLUCOSE BLD-MCNC: 187 MG/DL (ref 60–115)
GLUCOSE BLD-MCNC: 47 MG/DL (ref 70–99)
GLUCOSE BLD-MCNC: 97 MG/DL (ref 60–115)
GLUCOSE BLD-MCNC: 99 MG/DL
GLUCOSE BLD-MCNC: 99 MG/DL (ref 60–115)
HCT VFR BLD CALC: 43.7 % (ref 37–47)
HEMOGLOBIN: 15.2 G/DL (ref 12–16)
MCH RBC QN AUTO: 34.6 PG (ref 27–31.3)
MCHC RBC AUTO-ENTMCNC: 34.7 % (ref 33–37)
MCV RBC AUTO: 99.7 FL (ref 82–100)
PDW BLD-RTO: 12.8 % (ref 11.5–14.5)
PERFORMED ON: ABNORMAL
PERFORMED ON: NORMAL
PERFORMED ON: NORMAL
PLATELET # BLD: 222 K/UL (ref 130–400)
POTASSIUM SERPL-SCNC: 5.4 MEQ/L (ref 3.4–4.9)
RBC # BLD: 4.39 M/UL (ref 4.2–5.4)
SODIUM BLD-SCNC: 138 MEQ/L (ref 135–144)
TOTAL PROTEIN: 7 G/DL (ref 6.3–8)
WBC # BLD: 8.5 K/UL (ref 4.8–10.8)

## 2019-06-07 PROCEDURE — 2580000003 HC RX 258: Performed by: EMERGENCY MEDICINE

## 2019-06-07 PROCEDURE — 6370000000 HC RX 637 (ALT 250 FOR IP): Performed by: INTERNAL MEDICINE

## 2019-06-07 PROCEDURE — 96375 TX/PRO/DX INJ NEW DRUG ADDON: CPT

## 2019-06-07 PROCEDURE — 93970 EXTREMITY STUDY: CPT

## 2019-06-07 PROCEDURE — 99285 EMERGENCY DEPT VISIT HI MDM: CPT

## 2019-06-07 PROCEDURE — 2580000003 HC RX 258: Performed by: PHYSICIAN ASSISTANT

## 2019-06-07 PROCEDURE — 6360000002 HC RX W HCPCS: Performed by: INTERNAL MEDICINE

## 2019-06-07 PROCEDURE — 6360000002 HC RX W HCPCS: Performed by: PHYSICIAN ASSISTANT

## 2019-06-07 PROCEDURE — 80053 COMPREHEN METABOLIC PANEL: CPT

## 2019-06-07 PROCEDURE — 6360000002 HC RX W HCPCS: Performed by: EMERGENCY MEDICINE

## 2019-06-07 PROCEDURE — 2580000003 HC RX 258: Performed by: INTERNAL MEDICINE

## 2019-06-07 PROCEDURE — 36415 COLL VENOUS BLD VENIPUNCTURE: CPT

## 2019-06-07 PROCEDURE — 1210000000 HC MED SURG R&B

## 2019-06-07 PROCEDURE — 94664 DEMO&/EVAL PT USE INHALER: CPT

## 2019-06-07 PROCEDURE — 87040 BLOOD CULTURE FOR BACTERIA: CPT

## 2019-06-07 PROCEDURE — 96374 THER/PROPH/DIAG INJ IV PUSH: CPT

## 2019-06-07 PROCEDURE — 85027 COMPLETE CBC AUTOMATED: CPT

## 2019-06-07 RX ORDER — IPRATROPIUM BROMIDE AND ALBUTEROL SULFATE 2.5; .5 MG/3ML; MG/3ML
1 SOLUTION RESPIRATORY (INHALATION) EVERY 8 HOURS PRN
COMMUNITY

## 2019-06-07 RX ORDER — ONDANSETRON 2 MG/ML
4 INJECTION INTRAMUSCULAR; INTRAVENOUS ONCE
Status: COMPLETED | OUTPATIENT
Start: 2019-06-07 | End: 2019-06-07

## 2019-06-07 RX ORDER — DIVALPROEX SODIUM 125 MG/1
125 CAPSULE, COATED PELLETS ORAL DAILY
Status: ON HOLD | COMMUNITY
Start: 2019-06-19 | End: 2019-06-14 | Stop reason: HOSPADM

## 2019-06-07 RX ORDER — IPRATROPIUM BROMIDE AND ALBUTEROL SULFATE 2.5; .5 MG/3ML; MG/3ML
1 SOLUTION RESPIRATORY (INHALATION) EVERY 8 HOURS PRN
Status: DISCONTINUED | OUTPATIENT
Start: 2019-06-07 | End: 2019-06-11

## 2019-06-07 RX ORDER — OXYCODONE HYDROCHLORIDE AND ACETAMINOPHEN 5; 325 MG/1; MG/1
1 TABLET ORAL EVERY 6 HOURS PRN
Status: DISCONTINUED | OUTPATIENT
Start: 2019-06-07 | End: 2019-06-11

## 2019-06-07 RX ORDER — SODIUM CHLORIDE 9 MG/ML
INJECTION, SOLUTION INTRAVENOUS CONTINUOUS
Status: DISCONTINUED | OUTPATIENT
Start: 2019-06-07 | End: 2019-06-11

## 2019-06-07 RX ORDER — POTASSIUM CHLORIDE 750 MG/1
10 CAPSULE, EXTENDED RELEASE ORAL DAILY
COMMUNITY

## 2019-06-07 RX ORDER — DULOXETIN HYDROCHLORIDE 30 MG/1
30 CAPSULE, DELAYED RELEASE ORAL DAILY
Status: DISCONTINUED | OUTPATIENT
Start: 2019-06-07 | End: 2019-06-11

## 2019-06-07 RX ORDER — METOPROLOL SUCCINATE 25 MG/1
25 TABLET, EXTENDED RELEASE ORAL DAILY
Status: DISCONTINUED | OUTPATIENT
Start: 2019-06-07 | End: 2019-06-11

## 2019-06-07 RX ORDER — MORPHINE SULFATE 2 MG/ML
4 INJECTION, SOLUTION INTRAMUSCULAR; INTRAVENOUS ONCE
Status: COMPLETED | OUTPATIENT
Start: 2019-06-07 | End: 2019-06-07

## 2019-06-07 RX ORDER — DIVALPROEX SODIUM 125 MG/1
125 CAPSULE, COATED PELLETS ORAL 2 TIMES DAILY
Status: ON HOLD | COMMUNITY
Start: 2019-06-13 | End: 2019-06-14 | Stop reason: HOSPADM

## 2019-06-07 RX ORDER — FUROSEMIDE 10 MG/ML
40 INJECTION INTRAMUSCULAR; INTRAVENOUS DAILY
Status: ON HOLD | COMMUNITY
Start: 2019-06-04 | End: 2019-06-14 | Stop reason: HOSPADM

## 2019-06-07 RX ORDER — ZONISAMIDE 100 MG/1
100 CAPSULE ORAL DAILY
Status: DISCONTINUED | OUTPATIENT
Start: 2019-06-07 | End: 2019-06-11

## 2019-06-07 RX ORDER — ATORVASTATIN CALCIUM 10 MG/1
10 TABLET, FILM COATED ORAL NIGHTLY
Status: DISCONTINUED | OUTPATIENT
Start: 2019-06-07 | End: 2019-06-11

## 2019-06-07 RX ORDER — DIVALPROEX SODIUM 125 MG/1
125 CAPSULE, COATED PELLETS ORAL 3 TIMES DAILY
Status: DISCONTINUED | OUTPATIENT
Start: 2019-06-07 | End: 2019-06-11

## 2019-06-07 RX ORDER — SODIUM CHLORIDE 0.9 % (FLUSH) 0.9 %
10 SYRINGE (ML) INJECTION PRN
Status: DISCONTINUED | OUTPATIENT
Start: 2019-06-07 | End: 2019-06-11

## 2019-06-07 RX ORDER — LORAZEPAM 0.5 MG/1
0.25 TABLET ORAL EVERY 8 HOURS PRN
Status: ON HOLD | COMMUNITY
Start: 2019-06-06 | End: 2019-06-14

## 2019-06-07 RX ORDER — ACETAMINOPHEN 325 MG/1
650 TABLET ORAL EVERY 4 HOURS PRN
Status: DISCONTINUED | OUTPATIENT
Start: 2019-06-07 | End: 2019-06-11

## 2019-06-07 RX ORDER — ONDANSETRON 2 MG/ML
4 INJECTION INTRAMUSCULAR; INTRAVENOUS EVERY 6 HOURS PRN
Status: DISCONTINUED | OUTPATIENT
Start: 2019-06-07 | End: 2019-06-11

## 2019-06-07 RX ORDER — TRAZODONE HYDROCHLORIDE 50 MG/1
50 TABLET ORAL NIGHTLY
Status: DISCONTINUED | OUTPATIENT
Start: 2019-06-07 | End: 2019-06-11

## 2019-06-07 RX ORDER — POTASSIUM CHLORIDE 750 MG/1
10 CAPSULE, EXTENDED RELEASE ORAL DAILY
Status: DISCONTINUED | OUTPATIENT
Start: 2019-06-07 | End: 2019-06-11

## 2019-06-07 RX ORDER — MORPHINE SULFATE 4 MG/ML
4 INJECTION, SOLUTION INTRAMUSCULAR; INTRAVENOUS EVERY 4 HOURS PRN
Status: DISCONTINUED | OUTPATIENT
Start: 2019-06-07 | End: 2019-06-11

## 2019-06-07 RX ORDER — DIVALPROEX SODIUM 125 MG/1
125 CAPSULE, COATED PELLETS ORAL 3 TIMES DAILY
Status: ON HOLD | COMMUNITY
Start: 2019-06-05 | End: 2019-08-09

## 2019-06-07 RX ORDER — SODIUM CHLORIDE 0.9 % (FLUSH) 0.9 %
10 SYRINGE (ML) INJECTION EVERY 12 HOURS SCHEDULED
Status: DISCONTINUED | OUTPATIENT
Start: 2019-06-07 | End: 2019-06-11

## 2019-06-07 RX ORDER — PHENYTOIN SODIUM 100 MG/1
100 CAPSULE, EXTENDED RELEASE ORAL
Status: DISCONTINUED | OUTPATIENT
Start: 2019-06-07 | End: 2019-06-11

## 2019-06-07 RX ORDER — LORAZEPAM 1 MG/1
1 TABLET ORAL EVERY 4 HOURS PRN
Status: ON HOLD | COMMUNITY
End: 2019-06-14 | Stop reason: HOSPADM

## 2019-06-07 RX ORDER — ASPIRIN 81 MG/1
81 TABLET, CHEWABLE ORAL DAILY
Status: DISCONTINUED | OUTPATIENT
Start: 2019-06-07 | End: 2019-06-11

## 2019-06-07 RX ORDER — OXYCODONE HYDROCHLORIDE 5 MG/1
5 TABLET ORAL EVERY 8 HOURS PRN
Status: ON HOLD | COMMUNITY
End: 2019-06-14 | Stop reason: HOSPADM

## 2019-06-07 RX ORDER — LORAZEPAM 0.5 MG/1
0.25 TABLET ORAL EVERY 8 HOURS PRN
Status: DISCONTINUED | OUTPATIENT
Start: 2019-06-07 | End: 2019-06-11

## 2019-06-07 RX ORDER — DOCUSATE SODIUM 100 MG/1
100 CAPSULE, LIQUID FILLED ORAL EVERY MORNING
Status: DISCONTINUED | OUTPATIENT
Start: 2019-06-08 | End: 2019-06-11

## 2019-06-07 RX ORDER — PHENYTOIN SODIUM 100 MG/1
200 CAPSULE, EXTENDED RELEASE ORAL 2 TIMES DAILY
Status: DISCONTINUED | OUTPATIENT
Start: 2019-06-07 | End: 2019-06-11

## 2019-06-07 RX ORDER — FUROSEMIDE 40 MG/1
40 TABLET ORAL DAILY
Status: DISCONTINUED | OUTPATIENT
Start: 2019-06-09 | End: 2019-06-11

## 2019-06-07 RX ADMIN — MORPHINE SULFATE 4 MG: 2 INJECTION, SOLUTION INTRAMUSCULAR; INTRAVENOUS at 11:37

## 2019-06-07 RX ADMIN — SODIUM CHLORIDE: 9 INJECTION, SOLUTION INTRAVENOUS at 16:21

## 2019-06-07 RX ADMIN — DIVALPROEX SODIUM 125 MG: 125 CAPSULE, COATED PELLETS ORAL at 20:09

## 2019-06-07 RX ADMIN — TRAZODONE HYDROCHLORIDE 50 MG: 50 TABLET ORAL at 20:10

## 2019-06-07 RX ADMIN — VANCOMYCIN HYDROCHLORIDE 1250 MG: 5 INJECTION, POWDER, LYOPHILIZED, FOR SOLUTION INTRAVENOUS at 23:36

## 2019-06-07 RX ADMIN — PIPERACILLIN SODIUM,TAZOBACTAM SODIUM 3.38 G: 3; .375 INJECTION, POWDER, FOR SOLUTION INTRAVENOUS at 16:18

## 2019-06-07 RX ADMIN — MORPHINE SULFATE 4 MG: 4 INJECTION INTRAVENOUS at 16:14

## 2019-06-07 RX ADMIN — MORPHINE SULFATE 4 MG: 4 INJECTION INTRAVENOUS at 20:16

## 2019-06-07 RX ADMIN — OXYCODONE HYDROCHLORIDE AND ACETAMINOPHEN 1 TABLET: 5; 325 TABLET ORAL at 18:11

## 2019-06-07 RX ADMIN — ONDANSETRON 4 MG: 2 INJECTION INTRAMUSCULAR; INTRAVENOUS at 11:37

## 2019-06-07 RX ADMIN — ENOXAPARIN SODIUM 40 MG: 40 INJECTION SUBCUTANEOUS at 20:52

## 2019-06-07 RX ADMIN — PHENYTOIN SODIUM 200 MG: 100 CAPSULE ORAL at 20:45

## 2019-06-07 RX ADMIN — LORAZEPAM 0.25 MG: 0.5 TABLET ORAL at 20:20

## 2019-06-07 RX ADMIN — PHENYTOIN SODIUM 100 MG: 100 CAPSULE ORAL at 18:11

## 2019-06-07 RX ADMIN — VANCOMYCIN HYDROCHLORIDE 1000 MG: 1 INJECTION, POWDER, LYOPHILIZED, FOR SOLUTION INTRAVENOUS at 14:01

## 2019-06-07 RX ADMIN — ATORVASTATIN CALCIUM 10 MG: 10 TABLET, FILM COATED ORAL at 20:09

## 2019-06-07 ASSESSMENT — PAIN SCALES - GENERAL
PAINLEVEL_OUTOF10: 10
PAINLEVEL_OUTOF10: 5
PAINLEVEL_OUTOF10: 10

## 2019-06-07 ASSESSMENT — PAIN DESCRIPTION - ORIENTATION: ORIENTATION: LEFT;RIGHT

## 2019-06-07 NOTE — DISCHARGE INSTR - COC
Continuity of Care Form    Patient Name: Robyn Felty   :  1960  MRN:  [id number]    Admit date:  2019  Discharge date:  19    Code Status Order: Full Code   Advance Directives:     Admitting Physician:  Leelee Bajwa DO  PCP: Ju Nixon MD    Discharging Nurse: xin [address] Unit/Room#: U104/N156-75  Discharging Unit Phone Number: [phone]    Emergency Contact:   Extended Emergency Contact Information  Primary Emergency Contact: Sharron Nguyen [address] Phone: [phone]  Mobile Phone: [phone]  Relation: Other  Secondary Emergency Contact: Tim Brooks [address] Phone: [phone]  Mobile Phone: [phone]  Relation: Other    Past Surgical History:  Past Surgical History:   Procedure Laterality Date    LEG AMPUTATION BELOW KNEE Right        Immunization History:   Immunization History   Administered Date(s) Administered    Influenza Vaccine, unspecified formulation 10/30/2016    Pneumococcal Polysaccharide ([id number]) 10/30/2016       Active Problems:  Patient Active Problem List   Diagnosis Code    Lethargy R53.83    Ischemia of lower extremity I99.8    Cerebrovascular accident, old Z80.78    Noncompliance with treatment Z91.19    Deep vein thrombosis (DVT) of lower extremity ([facility] [address] I82.409    Angiopathy, peripheral ([facility] [address] I73.9    History of amputation of lower extremity through tibia and fibula ([facility] [address] Z89.519    Carotid artery disease ([facility] [address] I77.9    Dementia with aggressive behavior F03.91    Wound infection T14. 8XXA, L08.9       Isolation/Infection:   Isolation          No Isolation            Nurse Assessment:  Last Vital Signs: BP (!) 149/93   Pulse 83   Temp 98.3 °F (36.8 °C) (Oral)   Resp 16   Ht 5' 2\" (1.575 m)   Wt 200 lb (90.7 kg)   SpO2 97%   BMI 36.58 kg/m²     Last documented pain score (0-10 scale): Pain Level: 10  Last Weight:   Wt Readings from Last 1 Encounters:   19 200 lb (90.7 kg) Mental Status:  oriented    IV Access:  - PICC - site  R Elizabeth, insertion date: 6/13/19    Nursing Mobility/ADLs:  Walking   Assisted  Transfer  Assisted  Bathing  Assisted  Dressing  Dependent  Toileting  Dependent  Feeding  Independent  Med Admin  Assisted  Med Delivery   whole    Wound Care Documentation and Therapy:        Elimination:  Continence:   · Bowel: Yes and No  · Bladder: Yes and No  Urinary Catheter: Removal Date 6/12/19   Colostomy/Ileostomy/Ileal Conduit: No       Date of Last BM: 6/13/19    Intake/Output Summary (Last 24 hours) at 6/7/2019 1548  Last data filed at 6/7/2019 1518  Gross per 24 hour   Intake --   Output 150 ml   Net -150 ml     No intake/output data recorded. Safety Concerns: At Risk for Falls    Impairments/Disabilities:      Amputation - RBKA    Nutrition Therapy:  Current Nutrition Therapy:   - Oral Diet:  General    Routes of Feeding: Oral  Liquids: Thin Liquids  Daily Fluid Restriction: no  Last Modified Barium Swallow with Video (Video Swallowing Test): not done    Treatments at the Time of Hospital Discharge:   Respiratory Treatments: ***  Oxygen Therapy:  is not on home oxygen therapy. Ventilator:    - No ventilator support    Rehab Therapies: Physical Therapy and Occupational Therapy  Weight Bearing Status/Restrictions: Non-weight bearing on right leg  Other Medical Equipment (for information only, NOT a DME order):  {EQUIPMENT:[id number]}  Other Treatments: ***  Wound Care:  1. Cleanse wound(s) with Normal Saline  2. Apply adaptic or mepitel to wound bed  3. Picture frame wound with duoderm or vac drape  4. Cut Black sponge to size and apply to wound bed only per typical wound vac technique  5. Bridge to a non-pressure area if necessary  6. Settings: 100 mm Hg CONTINUOUS  7. Change Wound Vac 3 times a week.     Patient's personal belongings (please select all that are sent with patient):  Dentures upper    RN SIGNATURE:  Electronically signed by Rashid Quinn Jn RN on 6/14/19 at 1:42 PM    CASE MANAGEMENT/SOCIAL WORK SECTION    Inpatient Status Date: ***    Readmission Risk Assessment Score:  Readmission Risk              Risk of Unplanned Readmission:        11           Discharging to Facility/ Agency   · Name: [facility]  · Address:  · Phone:[phone]  · Fax:    Dialysis Facility (if applicable)   · Name:  · Address:  · Dialysis Schedule:  · Phone:  · Fax:    / signature: Electronically signed by SHRUTHI Clark on 6/7/19 at 3:48 PM    PHYSICIAN SECTION    Prognosis: Fair    Condition at Discharge: Stable    Rehab Potential (if transferring to Rehab): Fair    Recommended Labs or Other Treatments After Discharge: as per ID, ordered     Physician Certification: I certify the above information and transfer of Stephani Martinez  is necessary for the continuing treatment of the diagnosis listed and that she requires [place] for less 30 days.      Update Admission H&P: No change in H&P    PHYSICIAN SIGNATURE:  Electronically signed by Atul Cortez DO on 6/14/19 at 9:59 AM

## 2019-06-07 NOTE — ED NOTES
Bed: 23  Expected date: 6/7/19  Expected time: 10:35 AM  Means of arrival: [facility]  Comments:  Pt report called from [facility], pt has R BKA with signs of infection after scraping it on a wall \"few weeks ago\". Per report, warm, red, \"necrotic\" and painful. Pt has HX of DM and bipoler.  VSS     Garrick Thompson RN  06/07/19 0952

## 2019-06-07 NOTE — H&P
Hospital Medicine History & Physical      PCP: Dany Shahid MD    Date of Admission: 6/7/2019    Date of Service: 6/7/19      Chief Complaint:  Wound infection      History Of Present Illness:  60 y.o. female who presented to [facility] ED for complaints of an infected right BKA wound. The patient is a poor historian, but stating that it has been bothering her for some time. She currently resides at an [facility], and is not being treated with abx for it. Denies cp sob ha rash anx n v d f    Past Medical History:          Diagnosis Date    Altered mental status, unspecified     Aphasia     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     Athscl heart disease of native coronary artery w/o ang pctrs     CAD (coronary artery disease)     Cancer (HCC)     right BKA    Constipation     Constipation     Depression     MAJOR DEPRESSIVE DISORDER    Diabetes mellitus ([facility])     TYPE II    Diabetic neuropathy ([facility])     Hyperlipidemia     Hypertension     Insomnia     Muscle weakness     PVD (peripheral vascular disease) ([facility])     Seizures ([facility])     Vascular dementia     Vitamin D deficiency     Weakness        Past Surgical History:          Procedure Laterality Date    LEG AMPUTATION BELOW KNEE Right        Medications Prior to Admission:      Prior to Admission medications    Medication Sig Start Date End Date Taking?  Authorizing Provider   DULoxetine (CYMBALTA) 30 MG extended release capsule Take 1 capsule by mouth daily 1/23/19   Dorothy Reynolds MD   risperiDONE (RISPERDAL) 0.25 MG tablet Take 1 tablet by mouth 2 times daily 1/22/19   Dorothy Reynolds MD   phenytoin (DILANTIN) 100 MG ER capsule Take 200 mg by mouth 2 times daily MORNING AND AT BEDTIME    Historical Provider, MD   traZODone (DESYREL) 50 MG tablet Take 50 mg by mouth nightly    Historical Provider, MD   albuterol sulfate  (90 Base) MCG/ACT inhaler Inhale 2 puffs into the lungs every 8 hours as needed for Wheezing Historical Provider, MD   docusate sodium (COLACE) 100 MG capsule Take 100 mg by mouth every morning    Historical Provider, MD   MENTHOL-METHYL SALICYLATE EX Apply 95-61 % topically 3 times daily as needed MUSCLE RUB CREAM APPLY TO BLE TOPICALLY AS NEEDED FOR PAIN TID    Historical Provider, MD   acetaminophen (TYLENOL) 325 MG tablet Take 650 mg by mouth 3 times daily     Historical Provider, MD   aspirin 81 MG chewable tablet Take 81 mg by mouth daily    Historical Provider, MD   atorvastatin (LIPITOR) 10 MG tablet Take 10 mg by mouth nightly     Historical Provider, MD   vitamin D 1000 UNITS CAPS Take 1 capsule by mouth daily     Historical Provider, MD   isosorbide mononitrate (IMDUR) 30 MG extended release tablet Take 30 mg by mouth daily    Historical Provider, MD   furosemide (LASIX) 20 MG tablet Take 20 mg by mouth daily     Historical Provider, MD   magnesium hydroxide (MILK OF MAGNESIA) 400 MG/5ML suspension Take 30 mLs by mouth daily as needed for Constipation    Historical Provider, MD   phenytoin (DILANTIN) 100 MG ER capsule Take 100 mg by mouth Daily with lunch     Historical Provider, MD   metoprolol succinate (TOPROL XL) 25 MG extended release tablet Take 25 mg by mouth daily    Historical Provider, MD   zonisamide (ZONEGRAN) 100 MG capsule Take 100 mg by mouth daily    Historical Provider, MD       Allergies:  Patient has no known allergies. Social History:      The patient currently lives home    TOBACCO:   reports that she has been smoking cigarettes. She has a 6.50 pack-year smoking history. She has never used smokeless tobacco.  ETOH:   reports that she does not drink alcohol. Family History:      Positive as follows:    History reviewed. No pertinent family history. REVIEW OF SYSTEMS:   Pertinent positives as noted in the HPI. All other systems reviewed and negative.     PHYSICAL EXAM:    BP (!) 149/93   Pulse 83   Temp 98.3 °F (36.8 °C) (Oral)   Resp 16   Ht 5' 2\" (1.575 m) Wt 200 lb (90.7 kg)   SpO2 97%   BMI 36.58 kg/m²     General appearance:  No apparent distress, appears stated age and cooperative. HEENT:  Normal cephalic, atraumatic without obvious deformity. Pupils equal, round, and reactive to light. Extra ocular muscles intact. Conjunctivae/corneas clear. Neck: Supple, with full range of motion. No jugular venous distention. Trachea midline. Respiratory:  Normal respiratory effort. Clear to auscultation, bilaterally without Rales/Wheezes/Rhonchi. Cardiovascular:  Regular rate and rhythm with normal S1/S2 without murmurs, rubs or gallops. Abdomen: Soft, non-tender, non-distended with normal bowel sounds. Musculoskeletal:  Right BKA stump wound. Full range of motion . Skin: Skin color, texture, turgor normal.  No rashes or lesions. Neurologic:  Neurovascularly intact without any focal sensory/motor deficits. Cranial nerves: II-XII intact, grossly non-focal.  Psychiatric:  Alert and oriented, thought content appropriate, normal insight  Capillary Refill: Brisk,< 3 seconds   Peripheral Pulses: +2 palpable, equal bilaterally       Labs:     Recent Labs     06/07/19  1115   WBC 8.5   HGB 15.2   HCT 43.7        Recent Labs     06/07/19  1115      K 5.4*      CO2 25   BUN 12   CREATININE 0.42*   CALCIUM 9.3     Recent Labs     06/07/19  1115   AST 26   ALT 14   BILITOT <0.2   ALKPHOS 110     No results for input(s): INR in the last 72 hours. No results for input(s): Hair [place] in the last 72 hours.     Urinalysis:      Lab Results   Component Value Date    NITRU Negative 01/14/2019    WBCUA 0-2 12/20/2016    BACTERIA Many 12/20/2016    RBCUA 0-2 12/20/2016    BLOODU Negative 01/14/2019    SPECGRAV 1.016 01/14/2019    GLUCOSEU Negative 01/14/2019       Radiology:     CXR: I have reviewed the CXR with the following interpretation: pending  EKG:  I have reviewed the EKG with the following interpretation: pending    US DUP LOWER EXTREMITIES BILATERAL

## 2019-06-07 NOTE — ED PROVIDER NOTES
[facility] ED  eMERGENCY dEPARTMENT eNCOUnter      Pt Name: Vanessa Troy  MRN: [id number]  [place] 1960  Date of evaluation: 6/7/2019  Provider: Marie Mckeon [address]       Chief Complaint   Patient presents with    Wound Infection     right BKA          HISTORY OF PRESENT ILLNESS   (Location/Symptom, Timing/Onset,Context/Setting, Quality, Duration, Modifying Factors, Severity)  Note limiting factors. Vanessa Troy is a 60 y.o. female who presents to the emergency department with a chief complaint of infected wound. Patient is a poor historian but states it's been bothering her for a long time and is paining her significantly now. Per her MAR from the [facility] she is not currently on any antibiotics. HPI    NursingNotes were reviewed. REVIEW OF SYSTEMS    (2-9 systems for level 4, 10 or more for level 5)     Review of Systems   Unable to perform ROS: Dementia       Except as noted above the remainder of the review of systems was reviewed and negative.        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     Athscl heart disease of native coronary artery w/o ang pctrs     CAD (coronary artery disease)     Cancer (HCC)     right BKA    Constipation     Constipation     Depression     MAJOR DEPRESSIVE DISORDER    Diabetes mellitus (HCC)     TYPE II    Diabetic neuropathy (HCC)     Hyperlipidemia     Hypertension     Insomnia     Muscle weakness     [facility]     Seizures ([facility] [address]     Vascular dementia     Vitamin D deficiency     Weakness          SURGICALHISTORY       Past Surgical History:   Procedure Laterality Date    LEG AMPUTATION BELOW KNEE Right          CURRENT MEDICATIONS       Previous Medications    ACETAMINOPHEN (TYLENOL) 325 MG TABLET    Take 650 mg by mouth 3 times daily     ALBUTEROL SULFATE  (90 BASE) MCG/ACT INHALER    Inhale 2 puffs into the lungs every 8 hours as needed for Wheezing    ASPIRIN 81 MG CHEWABLE TABLET    Take 81 mg by mouth daily    ATORVASTATIN (LIPITOR) 10 MG TABLET    Take 10 mg by mouth nightly     DOCUSATE SODIUM (COLACE) 100 MG CAPSULE    Take 100 mg by mouth every morning    DULOXETINE (CYMBALTA) 30 MG EXTENDED RELEASE CAPSULE    Take 1 capsule by mouth daily    FUROSEMIDE (LASIX) 20 MG TABLET    Take 20 mg by mouth daily     ISOSORBIDE MONONITRATE (IMDUR) 30 MG EXTENDED RELEASE TABLET    Take 30 mg by mouth daily    MAGNESIUM HYDROXIDE (MILK OF MAGNESIA) 400 MG/5ML SUSPENSION    Take 30 mLs by mouth daily as needed for Constipation    MENTHOL-METHYL SALICYLATE EX    Apply 10-15 % topically 3 times daily as needed MUSCLE RUB CREAM APPLY TO BLE TOPICALLY AS NEEDED FOR PAIN TID    METOPROLOL SUCCINATE (TOPROL XL) 25 MG EXTENDED RELEASE TABLET    Take 25 mg by mouth daily    PHENYTOIN (DILANTIN) 100 MG ER CAPSULE    Take 100 mg by mouth Daily with lunch     PHENYTOIN (DILANTIN) 100 MG ER CAPSULE    Take 200 mg by mouth 2 times daily MORNING AND AT BEDTIME    RISPERIDONE (RISPERDAL) 0.25 MG TABLET    Take 1 tablet by mouth 2 times daily    TRAZODONE (DESYREL) 50 MG TABLET    Take 50 mg by mouth nightly    VITAMIN D 1000 UNITS CAPS    Take 1 capsule by mouth daily     ZONISAMIDE (ZONEGRAN) 100 MG CAPSULE    Take 100 mg by mouth daily       ALLERGIES     Patient has no known allergies. FAMILY HISTORY     History reviewed. No pertinent family history.        SOCIAL HISTORY       Social History     Socioeconomic History    Marital status: Single     Spouse name: None    Number of children: None    Years of education: None    Highest education level: None   Occupational History    None   Social Needs    Financial resource strain: None    Food insecurity:     Worry: None     Inability: None    Transportation needs:     Medical: None Non-medical: None   Tobacco Use    Smoking status: Current Every Day Smoker     Packs/day: 0.50     Years: 13.00     Pack years: 6.50     Types: Cigarettes    Smokeless tobacco: Never Used   Substance and Sexual Activity    Alcohol use: No     Comment: in nursing home for 3 years    Drug use: No    Sexual activity: Not Currently   Lifestyle    Physical activity:     Days per week: None     Minutes per session: None    Stress: None   Relationships    Social connections:     Talks on phone: None     Gets together: None     Attends Zoroastrian service: None     Active member of club or organization: None     Attends meetings of clubs or organizations: None     Relationship status: None    Intimate partner violence:     Fear of current or ex partner: None     Emotionally abused: None     Physically abused: None     Forced sexual activity: None   Other Topics Concern    None   Social History Narrative    None       SCREENINGS      @FLOW([id number])@      PHYSICAL EXAM    (up to 7 for level 4, 8 or more for level 5)     ED Triage Vitals [06/07/19 1100]   BP Temp Temp Source Pulse Resp SpO2 Height Weight   (!) 142/86 98.3 °F (36.8 °C) Oral 83 18 98 % 5' 2\" (1.575 m) 200 lb (90.7 kg)       Physical Exam   Constitutional: She is oriented to person, place, and time. She appears well-developed and well-nourished. She appears distressed. Patient is moaning   HENT:   Head: Normocephalic and atraumatic. Eyes: Pupils are equal, round, and reactive to light. Conjunctivae and EOM are normal.   Neck: Normal range of motion. Neck supple. Cardiovascular: Normal rate. Exam reveals no gallop and no friction rub. No murmur heard. Pulmonary/Chest: Effort normal.   Abdominal: Soft. Bowel sounds are normal. There is no tenderness. Musculoskeletal: Normal range of motion. Patient has an BKA on the right with a wound that appears infected and with erythema and cellulitis that is slightly warm to the touch.   Her left lower looks chronic but very infected currently. There is concern for cellulitis at both lower extremities. She is given a gram of the ankle. Her lab parameters are essentially unremarkable otherwise, she is negative for DVT and will be admitted to the hospitalist service for further care. [facility]    CRITICAL CARE TIME   Total Critical Care time was 0 minutes, excluding separately reportableprocedures. There was a high probability of clinicallysignificant/life threatening deterioration in the patient's condition which required my urgent intervention. CONSULTS:  IP CONSULT TO HOSPITALIST    PROCEDURES:  Unless otherwise noted below, none     Procedures    FINAL IMPRESSION      1. Leg wound, right, subsequent encounter    2.  Cellulitis and abscess of leg          DISPOSITION/PLAN   DISPOSITION Admitted 06/07/2019 01:26:36 PM      PATIENT REFERRED TO:  Omar Yadav MD  [address] [address]  [phone]            DISCHARGE MEDICATIONS:  New Prescriptions    No medications on file          (Please note that portions of this note were completed with a voice recognition program.  Efforts were made to edit the dictations but occasionally words are mis-transcribed.)    Juan House DO (electronically signed)  Attending Emergency Physician          Juan House DO  06/07/19 [address] [facility],   06/07/19 141

## 2019-06-07 NOTE — PROGRESS NOTES
Pharmacy Note  Vancomycin Consult    Evelio Levy is a 60 y.o. female started on Vancomycin for infected right BKA wound; consult received from Francisca Pantoja to manage therapy. Also receiving the following antibiotics: zosyn. Patient Active Problem List   Diagnosis    Lethargy    Ischemia of lower extremity    Cerebrovascular accident, old    Noncompliance with treatment    Deep vein thrombosis (DVT) of lower extremity (HCC)    Angiopathy, peripheral ([facility] [address]    History of amputation of lower extremity through tibia and fibula (HCC)    Carotid artery disease ([facility] [address]    Dementia with aggressive behavior    Wound infection       Allergies:  Patient has no known allergies. Recent Labs     06/07/19  1115   BUN 12       Recent Labs     06/07/19  1115   CREATININE 0.42*       Recent Labs     06/07/19  1115   WBC 8.5         Intake/Output Summary (Last 24 hours) at 6/7/2019 1611  Last data filed at 6/7/2019 1518  Gross per 24 hour   Intake --   Output 150 ml   Net -150 ml       Culture Date      Source                       Results  CULTURE BLOOD #1 [[id number]]    Order Status: Sent    CULTURE BLOOD #2 [[id number]]    Order Status: Sent    Wound Culture [[id number]]    Order Status: No result Specimen: Leg    Culture Blood #2 [[id number]] Collected: 06/07/19 1354   Order Status: Sent Specimen: Blood Updated: 06/07/19 1355   Culture Blood #1 [[id number]] Collected: 06/07/19 1255   Order Status: Sent Specimen: Blood Updated: 06/07/19 1255       Ht Readings from Last 1 Encounters:   06/07/19 5' 2\" (1.575 m)        Wt Readings from Last 1 Encounters:   06/07/19 200 lb (90.7 kg)         Body mass index is 36.58 kg/m². Estimated Creatinine Clearance: 151 mL/min (A) (based on SCr of 0.42 mg/dL (L)). Goal Trough Level: 10-20 mcg/mL    Assessment/Plan:  Will initiate vancomycin 1250 mg IV every 12 hours. Timing of trough level will be determined based on culture results, renal function, and clinical response.  Obtain TR before 4th dose on 6/9 @ 1030    Thank you for the consult. Will continue to follow.     Darrion Montes, PharmD  Staff Pharmacist  6/7/2019 4:15 PM

## 2019-06-07 NOTE — ED TRIAGE NOTES
Patient to ER via squad from [facility]. Patient has complaints of right leg necrotic ulcer. Patient has a right leg BKA. Patient complains of pain 10/10.

## 2019-06-08 LAB
ANION GAP SERPL CALCULATED.3IONS-SCNC: 10 MEQ/L (ref 9–15)
BASOPHILS ABSOLUTE: 0 K/UL (ref 0–0.2)
BASOPHILS RELATIVE PERCENT: 0.5 %
BUN BLDV-MCNC: 7 MG/DL (ref 6–20)
CALCIUM SERPL-MCNC: 8.7 MG/DL (ref 8.5–9.9)
CHLORIDE BLD-SCNC: 109 MEQ/L (ref 95–107)
CO2: 25 MEQ/L (ref 20–31)
CREAT SERPL-MCNC: 0.31 MG/DL (ref 0.5–0.9)
EOSINOPHILS ABSOLUTE: 0.1 K/UL (ref 0–0.7)
EOSINOPHILS RELATIVE PERCENT: 1.1 %
GFR AFRICAN AMERICAN: >60
GFR NON-AFRICAN AMERICAN: >60
GLUCOSE BLD-MCNC: 109 MG/DL (ref 70–99)
GLUCOSE BLD-MCNC: 115 MG/DL (ref 60–115)
GLUCOSE BLD-MCNC: 117 MG/DL (ref 60–115)
GLUCOSE BLD-MCNC: 157 MG/DL (ref 60–115)
GLUCOSE BLD-MCNC: 92 MG/DL (ref 60–115)
HCT VFR BLD CALC: 40.9 % (ref 37–47)
HEMOGLOBIN: 14 G/DL (ref 12–16)
LACTIC ACID: 0.9 MMOL/L (ref 0.5–2.2)
LYMPHOCYTES ABSOLUTE: 1.2 K/UL (ref 1–4.8)
LYMPHOCYTES RELATIVE PERCENT: 18.9 %
MCH RBC QN AUTO: 34.6 PG (ref 27–31.3)
MCHC RBC AUTO-ENTMCNC: 34.3 % (ref 33–37)
MCV RBC AUTO: 101 FL (ref 82–100)
MONOCYTES ABSOLUTE: 0.7 K/UL (ref 0.2–0.8)
MONOCYTES RELATIVE PERCENT: 10.5 %
NEUTROPHILS ABSOLUTE: 4.4 K/UL (ref 1.4–6.5)
NEUTROPHILS RELATIVE PERCENT: 69 %
PDW BLD-RTO: 13.1 % (ref 11.5–14.5)
PERFORMED ON: ABNORMAL
PERFORMED ON: ABNORMAL
PERFORMED ON: NORMAL
PERFORMED ON: NORMAL
PLATELET # BLD: 190 K/UL (ref 130–400)
POTASSIUM REFLEX MAGNESIUM: 3.8 MEQ/L (ref 3.4–4.9)
RBC # BLD: 4.05 M/UL (ref 4.2–5.4)
SODIUM BLD-SCNC: 144 MEQ/L (ref 135–144)
WBC # BLD: 6.3 K/UL (ref 4.8–10.8)

## 2019-06-08 PROCEDURE — 99254 IP/OBS CNSLTJ NEW/EST MOD 60: CPT | Performed by: INTERNAL MEDICINE

## 2019-06-08 PROCEDURE — 36415 COLL VENOUS BLD VENIPUNCTURE: CPT

## 2019-06-08 PROCEDURE — 6370000000 HC RX 637 (ALT 250 FOR IP): Performed by: INTERNAL MEDICINE

## 2019-06-08 PROCEDURE — 2580000003 HC RX 258: Performed by: INTERNAL MEDICINE

## 2019-06-08 PROCEDURE — 97162 PT EVAL MOD COMPLEX 30 MIN: CPT

## 2019-06-08 PROCEDURE — 6360000002 HC RX W HCPCS: Performed by: INTERNAL MEDICINE

## 2019-06-08 PROCEDURE — 85025 COMPLETE CBC W/AUTO DIFF WBC: CPT

## 2019-06-08 PROCEDURE — 83605 ASSAY OF LACTIC ACID: CPT

## 2019-06-08 PROCEDURE — 1210000000 HC MED SURG R&B

## 2019-06-08 PROCEDURE — 80048 BASIC METABOLIC PNL TOTAL CA: CPT

## 2019-06-08 PROCEDURE — 6360000002 HC RX W HCPCS: Performed by: PHYSICIAN ASSISTANT

## 2019-06-08 PROCEDURE — 2580000003 HC RX 258: Performed by: PHYSICIAN ASSISTANT

## 2019-06-08 RX ADMIN — OXYCODONE HYDROCHLORIDE AND ACETAMINOPHEN 1 TABLET: 5; 325 TABLET ORAL at 08:30

## 2019-06-08 RX ADMIN — DIVALPROEX SODIUM 125 MG: 125 CAPSULE, COATED PELLETS ORAL at 08:30

## 2019-06-08 RX ADMIN — ZONISAMIDE 100 MG: 100 CAPSULE ORAL at 08:30

## 2019-06-08 RX ADMIN — PHENYTOIN SODIUM 200 MG: 100 CAPSULE ORAL at 08:30

## 2019-06-08 RX ADMIN — VANCOMYCIN HYDROCHLORIDE 1250 MG: 5 INJECTION, POWDER, LYOPHILIZED, FOR SOLUTION INTRAVENOUS at 23:13

## 2019-06-08 RX ADMIN — ENOXAPARIN SODIUM 40 MG: 40 INJECTION SUBCUTANEOUS at 21:42

## 2019-06-08 RX ADMIN — PIPERACILLIN SODIUM,TAZOBACTAM SODIUM 3.38 G: 3; .375 INJECTION, POWDER, FOR SOLUTION INTRAVENOUS at 17:32

## 2019-06-08 RX ADMIN — VITAMIN D, TAB 1000IU (100/BT) 1000 UNITS: 25 TAB at 08:30

## 2019-06-08 RX ADMIN — VANCOMYCIN HYDROCHLORIDE 1250 MG: 5 INJECTION, POWDER, LYOPHILIZED, FOR SOLUTION INTRAVENOUS at 12:12

## 2019-06-08 RX ADMIN — OXYCODONE HYDROCHLORIDE AND ACETAMINOPHEN 1 TABLET: 5; 325 TABLET ORAL at 21:35

## 2019-06-08 RX ADMIN — ATORVASTATIN CALCIUM 10 MG: 10 TABLET, FILM COATED ORAL at 21:35

## 2019-06-08 RX ADMIN — TRAZODONE HYDROCHLORIDE 50 MG: 50 TABLET ORAL at 21:36

## 2019-06-08 RX ADMIN — POTASSIUM CHLORIDE 10 MEQ: 750 CAPSULE, EXTENDED RELEASE ORAL at 08:30

## 2019-06-08 RX ADMIN — OXYCODONE HYDROCHLORIDE AND ACETAMINOPHEN 1 TABLET: 5; 325 TABLET ORAL at 15:37

## 2019-06-08 RX ADMIN — PIPERACILLIN SODIUM,TAZOBACTAM SODIUM 3.38 G: 3; .375 INJECTION, POWDER, FOR SOLUTION INTRAVENOUS at 02:08

## 2019-06-08 RX ADMIN — DIVALPROEX SODIUM 125 MG: 125 CAPSULE, COATED PELLETS ORAL at 21:35

## 2019-06-08 RX ADMIN — DIVALPROEX SODIUM 125 MG: 125 CAPSULE, COATED PELLETS ORAL at 13:34

## 2019-06-08 RX ADMIN — ASPIRIN 81 MG 81 MG: 81 TABLET ORAL at 08:30

## 2019-06-08 RX ADMIN — SODIUM CHLORIDE: 9 INJECTION, SOLUTION INTRAVENOUS at 21:44

## 2019-06-08 RX ADMIN — PHENYTOIN SODIUM 100 MG: 100 CAPSULE ORAL at 11:43

## 2019-06-08 RX ADMIN — MORPHINE SULFATE 4 MG: 4 INJECTION INTRAVENOUS at 23:18

## 2019-06-08 RX ADMIN — MORPHINE SULFATE 4 MG: 4 INJECTION INTRAVENOUS at 12:05

## 2019-06-08 RX ADMIN — Medication 10 ML: at 08:35

## 2019-06-08 RX ADMIN — PIPERACILLIN SODIUM,TAZOBACTAM SODIUM 3.38 G: 3; .375 INJECTION, POWDER, FOR SOLUTION INTRAVENOUS at 08:45

## 2019-06-08 RX ADMIN — MORPHINE SULFATE 4 MG: 4 INJECTION INTRAVENOUS at 17:54

## 2019-06-08 RX ADMIN — METOPROLOL SUCCINATE 25 MG: 25 TABLET, EXTENDED RELEASE ORAL at 08:30

## 2019-06-08 RX ADMIN — DULOXETINE HYDROCHLORIDE 30 MG: 30 CAPSULE, DELAYED RELEASE ORAL at 08:30

## 2019-06-08 RX ADMIN — PHENYTOIN SODIUM 200 MG: 100 CAPSULE ORAL at 21:35

## 2019-06-08 ASSESSMENT — PAIN SCALES - GENERAL
PAINLEVEL_OUTOF10: 10
PAINLEVEL_OUTOF10: 9
PAINLEVEL_OUTOF10: 10
PAINLEVEL_OUTOF10: 6
PAINLEVEL_OUTOF10: 8
PAINLEVEL_OUTOF10: 10
PAINLEVEL_OUTOF10: 10

## 2019-06-08 ASSESSMENT — PAIN DESCRIPTION - ORIENTATION: ORIENTATION: RIGHT

## 2019-06-08 NOTE — CONSULTS
Packs/day: 0.50     Years: 13.00     Pack years: 6.50     Types: Cigarettes    Smokeless tobacco: Never Used   Substance and Sexual Activity    Alcohol use: No     Comment: in nursing home for 3 years    Drug use: No    Sexual activity: Not Currently   Lifestyle    Physical activity:     Days per week: Not on file     Minutes per session: Not on file    Stress: Not on file   Relationships    Social connections:     Talks on phone: Not on file     Gets together: Not on file     Attends Latter day service: Not on file     Active member of club or organization: Not on file     Attends meetings of clubs or organizations: Not on file     Relationship status: Not on file    Intimate partner violence:     Fear of current or ex partner: Not on file     Emotionally abused: Not on file     Physically abused: Not on file     Forced sexual activity: Not on file   Other Topics Concern    Not on file   Social History Narrative    Not on file       History reviewed. No pertinent family history. No current facility-administered medications on file prior to encounter. Current Outpatient Medications on File Prior to Encounter   Medication Sig Dispense Refill    divalproex (DEPAKOTE SPRINKLE) 125 MG capsule Take 125 mg by mouth 3 times daily      furosemide (LASIX) 10 MG/ML injection Infuse 40 mg intravenously daily      LORazepam (ATIVAN) 0.5 MG tablet Take 0.25 mg by mouth every 8 hours as needed for Anxiety.  LORazepam (ATIVAN) 1 MG tablet Take 1 mg by mouth every 4 hours as needed (Seizures).  oxyCODONE (ROXICODONE) 5 MG immediate release tablet Take 5 mg by mouth every 8 hours as needed for Pain.       potassium chloride (MICRO-K) 10 MEQ extended release capsule Take 10 mEq by mouth daily      ipratropium-albuterol (DUONEB) 0.5-2.5 (3) MG/3ML SOLN nebulizer solution Inhale 1 vial into the lungs every 8 hours as needed for Shortness of Breath (COPD)      [START ON 6/13/2019] divalproex (DEPAKOTE SPRINKLE) 125 MG capsule Take 125 mg by mouth 2 times daily      [START ON 6/19/2019] divalproex (DEPAKOTE SPRINKLE) 125 MG capsule Take 125 mg by mouth daily      DULoxetine (CYMBALTA) 30 MG extended release capsule Take 1 capsule by mouth daily 30 capsule 3    phenytoin (DILANTIN) 100 MG ER capsule Take 200 mg by mouth 2 times daily MORNING AND AT BEDTIME      traZODone (DESYREL) 50 MG tablet Take 50 mg by mouth nightly      docusate sodium (COLACE) 100 MG capsule Take 100 mg by mouth every morning      aspirin 81 MG chewable tablet Take 81 mg by mouth daily      atorvastatin (LIPITOR) 10 MG tablet Take 10 mg by mouth nightly       vitamin D 1000 UNITS CAPS Take 1 capsule by mouth daily       isosorbide mononitrate (IMDUR) 30 MG extended release tablet Take 30 mg by mouth daily      [START ON 6/9/2019] furosemide (LASIX) 20 MG tablet Take 40 mg by mouth daily       magnesium hydroxide (MILK OF MAGNESIA) 400 MG/5ML suspension Take 30 mLs by mouth daily as needed for Constipation      phenytoin (DILANTIN) 100 MG ER capsule Take 100 mg by mouth Daily with lunch       metoprolol succinate (TOPROL XL) 25 MG extended release tablet Take 25 mg by mouth daily      zonisamide (ZONEGRAN) 100 MG capsule Take 100 mg by mouth daily         Physical Exam:    General: Patient appears in no acute distress, cooperative  Skin: no new rashes or erythema or induration  Obese patient. HEENT: EOMI, MMM, Neck is supple  Heart: S1 S2  Lungs: bilaterally clear to auscultation  Abdomen: soft, ND, NTTP, +BS  Extrem:+RLE stump is very erythematous and painful to touch. + area of necrosis  Neuro exam: CN II-XII intact, facial expressions symmertrical bilaterally, no slurred speech      Labs: I have reviewed all lab results by electronic record, including most recent CBC, metabolic panel, and pertinent abnormalities were addressed from an infectious disease perspective. WBC trends are being monitored.     Lab Results Component Value Date     06/08/2019    K 3.8 06/08/2019     06/08/2019    CO2 25 06/08/2019    BUN 7 06/08/2019    CREATININE 0.31 06/08/2019    GLUCOSE 109 06/08/2019    GLUCOSE 94 12/28/2011    CALCIUM 8.7 06/08/2019      Lab Results   Component Value Date    WBC 6.3 06/08/2019    HGB 14.0 06/08/2019    HCT 40.9 06/08/2019    .0 (H) 06/08/2019     06/08/2019       Radiology:   I have reviewed imaging results per electronic record and most pertinent abnormalities are being addressed from an infectious disease standpoint.        Assessment:  R BKA wound 6x4cm  Obese patient  Cellulitis of RLE      Plan:  Cultures are pending  Vascular on consult  Vanco and Zosyn  Planned intervention 6/11      Martha Rick D.O.

## 2019-06-08 NOTE — PROGRESS NOTES
Physician Progress Note    2019   12:20 PM    Name:  Jen Rodriguez  MRN:    [id number]     IP Day: 1     Admit Date: 2019 10:56 AM  PCP: Vikash Camarena MD    Code Status:  Full Code    Subjective:      no new events. Pain is better controlled today.      Physical Examination:      Vitals:  BP (!) 148/71   Pulse 107   Temp 99.1 °F (37.3 °C) (Oral)   Resp 16   Ht 5' 2\" (1.575 m)   Wt 200 lb (90.7 kg)   SpO2 98%   BMI 36.58 kg/m²   Temp (24hrs), Av.8 °F (37.1 °C), Min:98.1 °F (36.7 °C), Max:99.1 °F (37.3 °C)      General appearance: alert, cooperative and no distress  Mental Status: oriented to person, place and time and normal affect  Lungs: clear to auscultation bilaterally, normal effort  Heart: regular rate and rhythm, no murmur  Abdomen: soft, nontender, nondistended, bowel sounds present, no masses  Extremities: no edema, redness, tenderness in the calves  Skin: right stump with erythema and necrotic 5x5 cm wounds        Data:     Labs:  Recent Labs     19  1115 19  0807   WBC 8.5 6.3   HGB 15.2 14.0    190     Recent Labs     19  1115 19  1357 19  0807     --  144   K 5.4*  --  3.8     --  109*   CO2 25  --  25   BUN 12  --  7   CREATININE 0.42*  --  0.31*   GLUCOSE 47* 99 109*     Recent Labs     19  1115   AST 26   ALT 14   BILITOT <0.2   ALKPHOS 110       Current Facility-Administered Medications   Medication Dose Route Frequency Provider Last Rate Last Dose    sodium chloride flush 0.9 % injection 10 mL  10 mL Intravenous 2 times per day EVELYN Christian   10 mL at 19 0835    sodium chloride flush 0.9 % injection 10 mL  10 mL Intravenous PRN EVELYN Najera        magnesium hydroxide (MILK OF MAGNESIA) 400 MG/5ML suspension 30 mL  30 mL Oral Daily PRN EVELYN Najera        ondansetron [facility]) injection 4 mg  4 mg Intravenous Q6H PRN Jonny EVELYN Giles        enoxaparin (LOVENOX) injection 40 mg  40 mg Subcutaneous Daily Sheridan Pappas   40 mg at 06/07/19 2052    0.9 % sodium chloride infusion   Intravenous Continuous Sheridan Padilla 75 mL/hr at 06/07/19 1621      piperacillin-tazobactam (ZOSYN) 3.375 g in dextrose 5 % 50 mL IVPB extended infusion (mini-bag)  3.375 g Intravenous Q8H Sheridan Padilla   Stopped at 06/08/19 1144    insulin lispro (HUMALOG) injection vial 0-12 Units  0-12 Units Subcutaneous TID WC Sheridan Padilla        insulin lispro (HUMALOG) injection vial 0-6 Units  0-6 Units Subcutaneous Nightly Sheridan Pappas        acetaminophen (TYLENOL) tablet 650 mg  650 mg Oral Q4H PRN Deonna Lime, DO        oxyCODONE-acetaminophen (PERCOCET) 5-325 MG per tablet 1 tablet  1 tablet Oral Q6H PRN Deonna Lime, DO   1 tablet at 06/08/19 0830    morphine injection 4 mg  4 mg Intravenous Q4H PRN Deonna Lime, DO   4 mg at 06/08/19 1205    vancomycin (VANCOCIN) 1,250 mg in dextrose 5 % 250 mL IVPB  1,250 mg Intravenous Q12H Deonna Lime,  mL/hr at 06/08/19 1212 1,250 mg at 06/08/19 1212    aspirin chewable tablet 81 mg  81 mg Oral Daily Deonna Lime, DO   81 mg at 06/08/19 0830    atorvastatin (LIPITOR) tablet 10 mg  10 mg Oral Nightly Ankurrell Lime, DO   10 mg at 06/07/19 2009    divalproex (DEPAKOTE SPRINKLE) capsule 125 mg  125 mg Oral TID Deonna Lime, DO   125 mg at 06/08/19 0830    docusate sodium (COLACE) capsule 100 mg  100 mg Oral QAM Deonna Lime, DO        DULoxetine (CYMBALTA) extended release capsule 30 mg  30 mg Oral Daily Ankurrell Lime, DO   30 mg at 06/08/19 0830    [START ON 6/9/2019] furosemide (LASIX) tablet 40 mg  40 mg Oral Daily Deonna Lime, DO        ipratropium-albuterol (DUONEB) nebulizer solution 3 mL  1 vial Inhalation Q8H PRN Denona Marsh DO        LORazepam (ATIVAN) tablet 0.25 mg  0.25 mg Oral Q8H PRN Don Villela DO   0.25 mg at 06/07/19 2020    magnesium hydroxide (MILK OF MAGNESIA) 400 MG/5ML suspension 30 mL  30 mL Oral Daily PRN Maryann Scriver, DO        metoprolol succinate (TOPROL XL) extended release tablet 25 mg  25 mg Oral Daily Maryann Scriver, DO   25 mg at 06/08/19 0830    phenytoin (DILANTIN) ER capsule 100 mg  100 mg Oral Lunch Maryann Scriver, DO   100 mg at 06/08/19 1143    phenytoin (DILANTIN) ER capsule 200 mg  200 mg Oral BID Maryann Scriver, DO   200 mg at 06/08/19 0830    potassium chloride (MICRO-K) extended release capsule 10 mEq  10 mEq Oral Daily Maryann Scriver, DO   10 mEq at 06/08/19 0830    traZODone (DESYREL) tablet 50 mg  50 mg Oral Nightly Maryann Scriver, DO   50 mg at 06/07/19 2010    vitamin D (CHOLECALCIFEROL) tablet 1,000 Units  1,000 Units Oral Daily Maryann Scriver, DO   1,000 Units at 06/08/19 0830    zonisamide (ZONEGRAN) capsule 100 mg  100 mg Oral Daily Maryann Scriver, DO   100 mg at 06/08/19 0830     Assessment and Plan:           Active problems:    # right stump/ BKA infection w/ necrosis  - start vancomycin and zosyn   - ID and vascular surgery consults (BKA done 3 years ago by Dr Garcia Walters per pt)  - follow up blood culture  - pain control prn   - will need an intervention on 6/11 as per Dr Linda Farris note     # diabetes   - resume insulin   - monitor     # HTN  - resume home meds    # seizure d/o   - on home AED's, monitor     DVT/PPx        DISCHARGE PLANNING  Pending vascular procedure        Electronically signed by Maryann Abdi DO on 6/8/2019 at 12:20 PM

## 2019-06-08 NOTE — PROGRESS NOTES
[facility] Respiratory Therapy Evaluation   Current Order: PRN duoneb    Home Regimen: PRN duoneb      Ordering Physician: Manohar  Re-evaluation Date: na    Diagnosis: wound infection     Patient Status: Stable / Unstable + Physician notified    The following MDI Criteria must be met in order to convert aerosol to MDI with spacer. If unable to meet, MDI will be converted to aerosol:  []  Patient able to demonstrate the ability to use MDI effectively  []  Patient alert and cooperative  []  Patient able to take deep breath with 5-10 second hold  []  Medication(s) available in this delivery method   []  Peak flow greater than or equal to 200 ml/min            Current Order Substituted To  (same drug, same frequency)   Aerosol to MDI [] Albuterol Sulfate 0.083% unit dose by aerosol Albuterol Sulfate MDI 2 puffs by inhalation with spacer    [] Levalbuterol 1.25 mg unit dose by aerosol Levalbuterol MDI 2 puffs by inhalation with spacer    [] Levalbuterol 0.63 mg unit dose by aerosol Levalbuterol MDI 2 puffs by inhalation with spacer    [] Ipratropium Bromide 0.02% unit dose by aerosol Ipratropium Bromide MDI 2 puffs by inhalation with spacer    [] Duoneb (Ipratropium + Albuterol) unit dose by aerosol Ipratropium MDI + Albuterol MDI 2 puffs by inhalation w/spacer   MDI to Aerosol [] Albuterol Sulfate MDI Albuterol Sulfate 0.083% unit dose by aerosol    [] Levalbuterol MDI 2 puffs by inhalation Levalbuterol 1.25 mg unit dose by aerosol    [] Ipratropium Bromide MDI by inhalation Ipratropium Bromide 0.02% unit dose by aerosol    [] Combivent (Ipratropium + Albuterol) MDI by inhalation Duoneb (Ipratropium + Albuterol) unit dose by aerosol   Treatment Assessment [Frequency/Schedule]:  Change frequency to: _________________no change_________________________________per Protocol, P&T, MEC      Points 0 1 2 3 4   Pulmonary Status  Non-Smoker  []   Smoking history   < 20 pack years  [x]   Smoking history  ?  20 pack years  [] Pulmonary Disorder  (acute or chronic)  []   Severe or Chronic w/ Exacerbation  []     Surgical Status No [x]   Surgeries     General []   Surgery Lower []   Abdominal Thoracic or []   Upper Abdominal Thoracic with  PulmonaryDisorder  []     Chest X-ray Clear/Not  Ordered     [x]  Chronic Changes  Results Pending  []  Infiltrates, atelectasis, pleural effusion, or edema  []  Infiltrates in more than one lobe []  Infiltrate + Atelectasis, &/or pleural effusion  []    Respiratory Pattern Regular,  RR = 12-20 [x]  Increased,  RR = 21-25 []  FONSECA, irregular,  or RR = 26-30 []  Decreased FEV1  or RR = 31-35 []  Severe SOB, use  of accessory muscles, or RR ? 35  []    Mental Status Alert, oriented,  Cooperative []  Confused but Follows commands [x]  Lethargic or unable to follow commands []  Obtunded  []  Comatose  []    Breath Sounds Clear to  auscultation  []  Decreased unilaterally or  in bases only [x]  Decreased  bilaterally  []  Crackles or intermittent wheezes []  Wheezes []    Cough Strong, Spontan., & nonproductive [x]  Strong,  spontaneous, &  productive []  Weak,  Nonproductive []  Weak, productive or  with wheezes []  No spontaneous  cough or may require suctioning []    Level of Activity Ambulatory [x]  Ambulatory w/ Assist  []  Non-ambulatory []  Paraplegic []  Quadriplegic []    Total    Score:_3______     Triage Score:__5______      Tri       Triage:     1. (>20) Freq: Q3    2. (16-20) Freq: Q4   3. (11-15) Freq: QID & Albuterol Q2 PRN    4. (6-10) Freq: TID & Albuterol Q2 PRN    5. (0-5) Freq Q4prn

## 2019-06-08 NOTE — PLAN OF CARE
Nutrition Problem: Increased nutrient needs  Intervention: Food and/or Nutrient Delivery: Modify current diet, Start ONS(Carb Control 4 Diet for improved blood glucose control to promote improved wound healing. Start wound healing ONS BID)  Nutritional Goals: PO intake >75% meals/ONS. Improved wound status.

## 2019-06-08 NOTE — PROGRESS NOTES
Physical Therapy Med Surg Initial Assessment  Facility/Department: [facility]  Room: [place] [address]       NAME: Anabella Hilario  : 1960 (29 y.o.)  MRN: [id number]  CODE STATUS: Full Code    Date of Service: 2019    Patient Diagnosis(es): Wound infection [T14. Sharri Comment, L08.9]   Chief Complaint   Patient presents with    Wound Infection     right BKA      Patient Active Problem List    Diagnosis Date Noted    Wound infection 2019    Dementia with aggressive behavior 01/15/2019    Lethargy 2016    Ischemia of lower extremity 2013    Cerebrovascular accident, old 2013    Angiopathy, peripheral ([facility] [address] 2013    Carotid artery disease ([facility] [address] 2013    History of amputation of lower extremity through tibia and fibula ([facility] [address] 10/14/2013    Deep vein thrombosis (DVT) of lower extremity ([facility] [address] 2013    Noncompliance with treatment 10/14/2009        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     Athscl heart disease of native coronary artery w/o ang pctrs     CAD (coronary artery disease)     Cancer (HCC)     right BKA    Constipation     Constipation     Depression     MAJOR DEPRESSIVE DISORDER    Diabetes mellitus (HCC)     TYPE II    Diabetic neuropathy (HCC)     Hyperlipidemia     Hypertension     Insomnia     Muscle weakness     PVD (peripheral vascular disease) (HCC)     Seizures ([address]     Vascular dementia     Vitamin D deficiency     Weakness      Past Surgical History:   Procedure Laterality Date    LEG AMPUTATION BELOW KNEE Right        Chart Reviewed: Yes  Patient assessed for rehabilitation services?: Yes  Family / Caregiver Present: No    Restrictions:  Restrictions/Precautions: Fall Risk(wound on distal R LE residual limb)     SUBJECTIVE: Response To Previous Treatment: Not applicable       Post Treatment Pain Screening:   Pain Screening  Patient Currently in Pain: Yes  Pain Assessment  Pain Assessment: 0-10  Pain Level: 10  Pain Location: (residual limb)  Pain Orientation: Right    Prior Level of Function:  Social/Functional History  Lives With: Other (comment)(SNF)  Type of Home: (SNF)  Home Layout: One level  Home Access: Level entry  Receives Help From: (SNF staff)  ADL Assistance: (sponge baths with indep to set up assist)  Homemaking Assistance: Needs assistance  Ambulation Assistance: Needs assistance(non- ambulatory, electric w/c user indep)  Transfer Assistance: Independent(squat pivot)  Active : No  Additional Comments: SNF PT    OBJECTIVE:   Vision/Hearing:  Vision: Impaired  Vision Exceptions: (blind L eye)  Hearing: Within functional limits    Cognition:  Overall Orientation Status: Within Normal Limits  Follows Commands: Within Functional Limits    Observation/Palpation  Observation: aphasia, redness, cellulitis, R BKA with wound (black)    ROM:  RLE AROM: WFL  RLE General AROM: BKA  LLE AROM : WFL    Strength:  Strength RLE  Comment: hip >3/5 functionally assessed  Strength LLE  Comment: 3+/5 functionally assessed  Strength Other  Other: good core control    Neuro:  Balance  Posture: Good  Sitting - Static: Good  Sitting - Dynamic: Good;-             Bed mobility  Supine to Sit: Contact guard assistance  Sit to Supine: Contact guard assistance    Transfers  Sit to Stand: Independent  Stand to sit: Independent  Bed to Chair: Independent    Ambulation  Ambulation?: No(non-ambulatory)  Wheelchair Activities  Wheelchair Size: (unable to assess w/c mobility d/t no electric w/c present)    Activity Tolerance  Activity Tolerance: Patient Tolerated treatment well          ASSESSMENT:   Body structures, Functions, Activity limitations: Decreased strength; Increased Pain;Decreased functional mobility   Decision Making: Medium Complexity  History: high  Exam: high  Clinical Presentation: med    Specific instructions for Next Treatment: transfer training, seated balance, TE as tolerated  Prognosis: Good  Patient Education:  PT POC, falls risk, call light for assistance    DISCHARGE RECOMMENDATIONS:       Assessment: Pt would benefit from PT to address decline in functional mobility and indep. PLAN OF CARE:  Plan  Times per week: 3-6  Plan weeks: course of admission  Specific instructions for Next Treatment: transfer training, seated balance, TE as tolerated  Current Treatment Recommendations: Strengthening, Functional Mobility Training, Transfer Training, Home Exercise Program, Equipment Evaluation, Education, & procurement, Safety Education & Training, Positioning, Patient/Caregiver Education & Training, Balance Training, Neuromuscular Re-education  Safety Devices  Type of devices:  All fall risk precautions in place    Goals:  Long term goals  Long term goal 1: mod I with bed mobilty   Long term goal 2: functional transfers with mod I  Long term goal 3: seated reaching with no LOB outside of DANITA    AMPAC (6 CLICK) BASIC MOBILITY  AM-PAC Inpatient Mobility Raw Score : 14     Therapy Time:   Individual   Time In 1315   Time Out 1332   Minutes [address], [place], 06/08/19 at 1:41 PM

## 2019-06-09 LAB
ANION GAP SERPL CALCULATED.3IONS-SCNC: 10 MEQ/L (ref 9–15)
BASOPHILS ABSOLUTE: 0 K/UL (ref 0–0.2)
BASOPHILS RELATIVE PERCENT: 0.6 %
BUN BLDV-MCNC: 4 MG/DL (ref 6–20)
CALCIUM SERPL-MCNC: 8.5 MG/DL (ref 8.5–9.9)
CHLORIDE BLD-SCNC: 109 MEQ/L (ref 95–107)
CO2: 22 MEQ/L (ref 20–31)
CREAT SERPL-MCNC: 0.39 MG/DL (ref 0.5–0.9)
EOSINOPHILS ABSOLUTE: 0.1 K/UL (ref 0–0.7)
EOSINOPHILS RELATIVE PERCENT: 1.4 %
GFR AFRICAN AMERICAN: >60
GFR NON-AFRICAN AMERICAN: >60
GLUCOSE BLD-MCNC: 107 MG/DL (ref 60–115)
GLUCOSE BLD-MCNC: 112 MG/DL (ref 60–115)
GLUCOSE BLD-MCNC: 90 MG/DL (ref 60–115)
GLUCOSE BLD-MCNC: 91 MG/DL (ref 70–99)
GLUCOSE BLD-MCNC: 96 MG/DL (ref 60–115)
HCT VFR BLD CALC: 37.5 % (ref 37–47)
HEMOGLOBIN: 12.8 G/DL (ref 12–16)
LACTIC ACID: 0.9 MMOL/L (ref 0.5–2.2)
LYMPHOCYTES ABSOLUTE: 2.1 K/UL (ref 1–4.8)
LYMPHOCYTES RELATIVE PERCENT: 33.6 %
MCH RBC QN AUTO: 34.6 PG (ref 27–31.3)
MCHC RBC AUTO-ENTMCNC: 34 % (ref 33–37)
MCV RBC AUTO: 101.8 FL (ref 82–100)
MONOCYTES ABSOLUTE: 0.6 K/UL (ref 0.2–0.8)
MONOCYTES RELATIVE PERCENT: 10.4 %
NEUTROPHILS ABSOLUTE: 3.4 K/UL (ref 1.4–6.5)
NEUTROPHILS RELATIVE PERCENT: 54 %
PDW BLD-RTO: 12.9 % (ref 11.5–14.5)
PERFORMED ON: NORMAL
PLATELET # BLD: 160 K/UL (ref 130–400)
POTASSIUM REFLEX MAGNESIUM: 3.9 MEQ/L (ref 3.4–4.9)
RBC # BLD: 3.68 M/UL (ref 4.2–5.4)
SODIUM BLD-SCNC: 141 MEQ/L (ref 135–144)
VANCOMYCIN TROUGH: 9.5 UG/ML (ref 10–20)
WBC # BLD: 6.3 K/UL (ref 4.8–10.8)

## 2019-06-09 PROCEDURE — 80202 ASSAY OF VANCOMYCIN: CPT

## 2019-06-09 PROCEDURE — 85025 COMPLETE CBC W/AUTO DIFF WBC: CPT

## 2019-06-09 PROCEDURE — 6360000002 HC RX W HCPCS: Performed by: INTERNAL MEDICINE

## 2019-06-09 PROCEDURE — 83605 ASSAY OF LACTIC ACID: CPT

## 2019-06-09 PROCEDURE — 2580000003 HC RX 258: Performed by: INTERNAL MEDICINE

## 2019-06-09 PROCEDURE — 80048 BASIC METABOLIC PNL TOTAL CA: CPT

## 2019-06-09 PROCEDURE — 1210000000 HC MED SURG R&B

## 2019-06-09 PROCEDURE — 6360000002 HC RX W HCPCS: Performed by: PHYSICIAN ASSISTANT

## 2019-06-09 PROCEDURE — 2580000003 HC RX 258: Performed by: PHYSICIAN ASSISTANT

## 2019-06-09 PROCEDURE — 6370000000 HC RX 637 (ALT 250 FOR IP): Performed by: INTERNAL MEDICINE

## 2019-06-09 PROCEDURE — 36415 COLL VENOUS BLD VENIPUNCTURE: CPT

## 2019-06-09 PROCEDURE — 99232 SBSQ HOSP IP/OBS MODERATE 35: CPT | Performed by: INTERNAL MEDICINE

## 2019-06-09 RX ADMIN — ASPIRIN 81 MG 81 MG: 81 TABLET ORAL at 09:11

## 2019-06-09 RX ADMIN — FUROSEMIDE 40 MG: 40 TABLET ORAL at 09:12

## 2019-06-09 RX ADMIN — PIPERACILLIN SODIUM,TAZOBACTAM SODIUM 3.38 G: 3; .375 INJECTION, POWDER, FOR SOLUTION INTRAVENOUS at 09:17

## 2019-06-09 RX ADMIN — ENOXAPARIN SODIUM 40 MG: 40 INJECTION SUBCUTANEOUS at 21:00

## 2019-06-09 RX ADMIN — VANCOMYCIN HYDROCHLORIDE 1500 MG: 5 INJECTION, POWDER, LYOPHILIZED, FOR SOLUTION INTRAVENOUS at 23:28

## 2019-06-09 RX ADMIN — DIVALPROEX SODIUM 125 MG: 125 CAPSULE, COATED PELLETS ORAL at 09:12

## 2019-06-09 RX ADMIN — DULOXETINE HYDROCHLORIDE 30 MG: 30 CAPSULE, DELAYED RELEASE ORAL at 09:11

## 2019-06-09 RX ADMIN — SODIUM CHLORIDE: 9 INJECTION, SOLUTION INTRAVENOUS at 12:17

## 2019-06-09 RX ADMIN — Medication 10 ML: at 09:18

## 2019-06-09 RX ADMIN — OXYCODONE HYDROCHLORIDE AND ACETAMINOPHEN 1 TABLET: 5; 325 TABLET ORAL at 23:26

## 2019-06-09 RX ADMIN — PIPERACILLIN SODIUM,TAZOBACTAM SODIUM 3.38 G: 3; .375 INJECTION, POWDER, FOR SOLUTION INTRAVENOUS at 17:06

## 2019-06-09 RX ADMIN — MORPHINE SULFATE 4 MG: 4 INJECTION INTRAVENOUS at 21:28

## 2019-06-09 RX ADMIN — METOPROLOL SUCCINATE 25 MG: 25 TABLET, EXTENDED RELEASE ORAL at 09:11

## 2019-06-09 RX ADMIN — ZONISAMIDE 100 MG: 100 CAPSULE ORAL at 09:11

## 2019-06-09 RX ADMIN — OXYCODONE HYDROCHLORIDE AND ACETAMINOPHEN 1 TABLET: 5; 325 TABLET ORAL at 04:01

## 2019-06-09 RX ADMIN — OXYCODONE HYDROCHLORIDE AND ACETAMINOPHEN 1 TABLET: 5; 325 TABLET ORAL at 16:54

## 2019-06-09 RX ADMIN — PHENYTOIN SODIUM 200 MG: 100 CAPSULE ORAL at 20:51

## 2019-06-09 RX ADMIN — DOCUSATE SODIUM 100 MG: 100 CAPSULE, LIQUID FILLED ORAL at 09:12

## 2019-06-09 RX ADMIN — PIPERACILLIN SODIUM,TAZOBACTAM SODIUM 3.38 G: 3; .375 INJECTION, POWDER, FOR SOLUTION INTRAVENOUS at 02:21

## 2019-06-09 RX ADMIN — ATORVASTATIN CALCIUM 10 MG: 10 TABLET, FILM COATED ORAL at 20:52

## 2019-06-09 RX ADMIN — OXYCODONE HYDROCHLORIDE AND ACETAMINOPHEN 1 TABLET: 5; 325 TABLET ORAL at 09:58

## 2019-06-09 RX ADMIN — MORPHINE SULFATE 4 MG: 4 INJECTION INTRAVENOUS at 05:02

## 2019-06-09 RX ADMIN — DIVALPROEX SODIUM 125 MG: 125 CAPSULE, COATED PELLETS ORAL at 13:36

## 2019-06-09 RX ADMIN — POTASSIUM CHLORIDE 10 MEQ: 750 CAPSULE, EXTENDED RELEASE ORAL at 09:11

## 2019-06-09 RX ADMIN — PHENYTOIN SODIUM 200 MG: 100 CAPSULE ORAL at 09:12

## 2019-06-09 RX ADMIN — TRAZODONE HYDROCHLORIDE 50 MG: 50 TABLET ORAL at 20:51

## 2019-06-09 RX ADMIN — DIVALPROEX SODIUM 125 MG: 125 CAPSULE, COATED PELLETS ORAL at 20:51

## 2019-06-09 RX ADMIN — VITAMIN D, TAB 1000IU (100/BT) 1000 UNITS: 25 TAB at 09:11

## 2019-06-09 RX ADMIN — PHENYTOIN SODIUM 100 MG: 100 CAPSULE ORAL at 11:24

## 2019-06-09 RX ADMIN — VANCOMYCIN HYDROCHLORIDE 1250 MG: 5 INJECTION, POWDER, LYOPHILIZED, FOR SOLUTION INTRAVENOUS at 12:28

## 2019-06-09 ASSESSMENT — PAIN SCALES - GENERAL
PAINLEVEL_OUTOF10: 10

## 2019-06-09 NOTE — CARE COORDINATION
PATIENT FROM [facility], LTC BED HOLD , PLAN IS TO RETURN WHEN STABLE,  PER Ruperto Florezjt  FOR Tuesday.

## 2019-06-09 NOTE — PROGRESS NOTES
Physician Progress Note    2019   12:44 PM    Name:  Gustavo Arizmendi  MRN:    [id number]     [facility] Day: 2     Admit Date: 2019 10:56 AM  PCP: Jared Kincaid MD    Code Status:  Full Code    Subjective:      no new events. No new complaints.       Physical Examination:      Vitals:  /70   Pulse 78   Temp 98.6 °F (37 °C) (Oral)   Resp 18   Ht 5' 2\" (1.575 m)   Wt 200 lb (90.7 kg)   SpO2 95%   BMI 36.58 kg/m²   Temp (24hrs), Av.7 °F (37.1 °C), Min:98.6 °F (37 °C), Max:98.8 °F (37.1 °C)      General appearance: alert, cooperative and no distress  Mental Status: oriented to person, place and time and normal affect  Lungs: clear to auscultation bilaterally, normal effort  Heart: regular rate and rhythm, no murmur  Abdomen: soft, nontender, nondistended, bowel sounds present, no masses  Extremities: no edema, redness, tenderness in the calves  Skin: right stump with erythema and necrotic 5x5 cm wounds        Data:     Labs:  Recent Labs     19  0807 19  0753   WBC 6.3 6.3   HGB 14.0 12.8    160     Recent Labs     19  0807 19  0753    141   K 3.8 3.9   * 109*   CO2 25 22   BUN 7 4*   CREATININE 0.31* 0.39*   GLUCOSE 109* 91     Recent Labs     19  1115   AST 26   ALT 14   BILITOT <0.2   ALKPHOS 110       Current Facility-Administered Medications   Medication Dose Route Frequency Provider Last Rate Last Dose    vancomycin (VANCOCIN) intermittent dosing (placeholder)   Other RX Placeholder Sheridan Garrido        sodium chloride flush 0.9 % injection 10 mL  10 mL Intravenous 2 times per day EVELYN Garrido   10 mL at 19 0918    sodium chloride flush 0.9 % injection 10 mL  10 mL Intravenous PRN Render EVELYN Bertrand        magnesium hydroxide (MILK OF MAGNESIA) 400 MG/5ML suspension 30 mL  30 mL Oral Daily PRN Render EVELYN Bertrand        ondansetron [facility]) injection 4 mg  4 mg Intravenous Q6H PRN Sheridan Garrido  enoxaparin (LOVENOX) injection 40 mg  40 mg Subcutaneous Daily [place], [place]   40 mg at 06/08/19 2142    0.9 % sodium chloride infusion   Intravenous Continuous Sheridan Saldana 75 mL/hr at 06/09/19 1217      piperacillin-tazobactam (ZOSYN) 3.375 g in dextrose 5 % 50 mL IVPB extended infusion (mini-bag)  3.375 g Intravenous Q8H EVELYN Saldana 12.5 mL/hr at 06/09/19 0917 3.375 g at 06/09/19 0917    insulin lispro (HUMALOG) injection vial 0-12 Units  0-12 Units Subcutaneous TID [facility] Mariano Saldanama        insulin lispro (HUMALOG) injection vial 0-6 Units  0-6 Units Subcutaneous Nightly [place], [place]   1 Units at 06/08/19 2314    acetaminophen (TYLENOL) tablet 650 mg  650 mg Oral Q4H PRN Ace Latus, DO        oxyCODONE-acetaminophen (PERCOCET) 5-325 MG per tablet 1 tablet  1 tablet Oral Q6H PRN Ace Latus, DO   1 tablet at 06/09/19 0958    morphine injection 4 mg  4 mg Intravenous Q4H PRN Ace Latus, DO   4 mg at 06/09/19 0502    vancomycin (VANCOCIN) 1,250 mg in dextrose 5 % 250 mL IVPB  1,250 mg Intravenous Q12H Ace Latus,  mL/hr at 06/09/19 1228 1,250 mg at 06/09/19 1228    aspirin chewable tablet 81 mg  81 mg Oral Daily Ace Latus, DO   81 mg at 06/09/19 0911    atorvastatin (LIPITOR) tablet 10 mg  10 mg Oral Nightly Ace Latus, DO   10 mg at 06/08/19 2135    divalproex (DEPAKOTE SPRINKLE) capsule 125 mg  125 mg Oral TID Ace Latus, DO   125 mg at 06/09/19 0912    docusate sodium (COLACE) capsule 100 mg  100 mg Oral QAM Yazid R Manohar, DO   100 mg at 06/09/19 0912    DULoxetine (CYMBALTA) extended release capsule 30 mg  30 mg Oral Daily Ace Latus, DO   30 mg at 06/09/19 0911    furosemide (LASIX) tablet 40 mg  40 mg Oral Daily Ace Latus, DO   40 mg at 06/09/19 0912    ipratropium-albuterol (DUONEB) nebulizer solution 3 mL  1 vial Inhalation Q8H PRN Ace Latus, DO        LORazepam (ATIVAN) tablet 0.25 mg  0.25 mg Oral Q8H PRN Marilee Peach, DO   0.25 mg at 06/07/19 2020    magnesium hydroxide (MILK OF MAGNESIA) 400 MG/5ML suspension 30 mL  30 mL Oral Daily PRN Marilee Peach, DO        metoprolol succinate (TOPROL XL) extended release tablet 25 mg  25 mg Oral Daily Marilee Peach, DO   25 mg at 06/09/19 0911    phenytoin (DILANTIN) ER capsule 100 mg  100 mg Oral Lunch Marilee Peach, DO   100 mg at 06/09/19 1124    phenytoin (DILANTIN) ER capsule 200 mg  200 mg Oral BID Marilee Peach, DO   200 mg at 06/09/19 0912    potassium chloride (MICRO-K) extended release capsule 10 mEq  10 mEq Oral Daily Marilee Peach, DO   10 mEq at 06/09/19 0911    traZODone (DESYREL) tablet 50 mg  50 mg Oral Nightly Marilee Peach, DO   50 mg at 06/08/19 2136    vitamin D (CHOLECALCIFEROL) tablet 1,000 Units  1,000 Units Oral Daily Marilee Peach, DO   1,000 Units at 06/09/19 0911    zonisamide (ZONEGRAN) capsule 100 mg  100 mg Oral Daily Marilee Peach, DO   100 mg at 06/09/19 0996     Assessment and Plan:           Active problems:    # right stump/ BKA infection w/ necrosis  -  vancomycin and zosyn   - ID and vascular surgery consults (BKA done 3 years ago by Dr Cary Caruso per pt)  - follow up blood culture  - pain control prn   - plan for an intervention on 6/11 as per Dr Sarah Vallejo note     # diabetes   - resume insulin   - monitor     # HTN  - resume home meds    # seizure d/o   - on home AED's, monitor     DVT/PPx        DISCHARGE PLANNING  Pending vascular procedure        Electronically signed by Marilee Rowe DO on 6/9/2019 at 12:44 PM

## 2019-06-09 NOTE — PROGRESS NOTES
trough for 6/11 after 4th dose of new regimen. Renal function remains steady. Cultures still pending. Will follow daily to monitor clinical updates to patient.

## 2019-06-09 NOTE — PROGRESS NOTES
Bernardo Collins  1960  female  Medical Record Number: [id number]    Patient informed that I am an Infectious Disease physician and permission obtained from the patient to speak in front of any visitors prior to any discussion for HIPPA purposes. HPI: Patient with R BKA stump wound ( BKA 3 years ago )   With tissue necrosis and erythema    Blood cultures negative to date. Subjectively, no new complaints at this time.    Erythema improving    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     Athscl heart disease of native coronary artery w/o ang pctrs     CAD (coronary artery disease)     Cancer (HCC)     right BKA    Constipation     Constipation     Depression     MAJOR DEPRESSIVE DISORDER    Diabetes mellitus ([facility] [address]     TYPE II    Diabetic neuropathy ([facility] [address]     Hyperlipidemia     Hypertension     Insomnia     Muscle weakness     PVD (peripheral vascular disease) (HCC)     Seizures [facility])     Vascular dementia     Vitamin D deficiency     Weakness        Past Surgical History:   Procedure Laterality Date    LEG AMPUTATION BELOW KNEE Right        Social History     Socioeconomic History    Marital status: Single     Spouse name: Not on file    Number of children: Not on file    Years of education: Not on file    Highest education level: Not on file   Occupational History    Not on file   Social Needs    Financial resource strain: Not on file    Food insecurity:     Worry: Not on file     Inability: Not on file    Transportation needs:     Medical: Not on file     Non-medical: Not on file   Tobacco Use    Smoking status: Current Every Day Smoker     Packs/day: 0.50     Years: 13.00     Pack years: 6.50     Types: Cigarettes    Smokeless tobacco: Never Used Substance and Sexual Activity    Alcohol use: No     Comment: in nursing home for 3 years    Drug use: No    Sexual activity: Not Currently   Lifestyle    Physical activity:     Days per week: Not on file     Minutes per session: Not on file    Stress: Not on file   Relationships    Social connections:     Talks on phone: Not on file     Gets together: Not on file     Attends Rastafarian service: Not on file     Active member of club or organization: Not on file     Attends meetings of clubs or organizations: Not on file     Relationship status: Not on file    Intimate partner violence:     Fear of current or ex partner: Not on file     Emotionally abused: Not on file     Physically abused: Not on file     Forced sexual activity: Not on file   Other Topics Concern    Not on file   Social History Narrative    Not on file       History reviewed. No pertinent family history. No current facility-administered medications on file prior to encounter. Current Outpatient Medications on File Prior to Encounter   Medication Sig Dispense Refill    divalproex (DEPAKOTE SPRINKLE) 125 MG capsule Take 125 mg by mouth 3 times daily      furosemide (LASIX) 10 MG/ML injection Infuse 40 mg intravenously daily      LORazepam (ATIVAN) 0.5 MG tablet Take 0.25 mg by mouth every 8 hours as needed for Anxiety.  LORazepam (ATIVAN) 1 MG tablet Take 1 mg by mouth every 4 hours as needed (Seizures).  oxyCODONE (ROXICODONE) 5 MG immediate release tablet Take 5 mg by mouth every 8 hours as needed for Pain.       potassium chloride (MICRO-K) 10 MEQ extended release capsule Take 10 mEq by mouth daily      ipratropium-albuterol (DUONEB) 0.5-2.5 (3) MG/3ML SOLN nebulizer solution Inhale 1 vial into the lungs every 8 hours as needed for Shortness of Breath (COPD)      [START ON 6/13/2019] divalproex (DEPAKOTE SPRINKLE) 125 MG capsule Take 125 mg by mouth 2 times daily      [START ON 6/19/2019] divalproex (DEPAKOTE SPRINKLE) 125 MG capsule Take 125 mg by mouth daily      DULoxetine (CYMBALTA) 30 MG extended release capsule Take 1 capsule by mouth daily 30 capsule 3    phenytoin (DILANTIN) 100 MG ER capsule Take 200 mg by mouth 2 times daily MORNING AND AT BEDTIME      traZODone (DESYREL) 50 MG tablet Take 50 mg by mouth nightly      docusate sodium (COLACE) 100 MG capsule Take 100 mg by mouth every morning      aspirin 81 MG chewable tablet Take 81 mg by mouth daily      atorvastatin (LIPITOR) 10 MG tablet Take 10 mg by mouth nightly       vitamin D 1000 UNITS CAPS Take 1 capsule by mouth daily       isosorbide mononitrate (IMDUR) 30 MG extended release tablet Take 30 mg by mouth daily      furosemide (LASIX) 20 MG tablet Take 40 mg by mouth daily       magnesium hydroxide (MILK OF MAGNESIA) 400 MG/5ML suspension Take 30 mLs by mouth daily as needed for Constipation      phenytoin (DILANTIN) 100 MG ER capsule Take 100 mg by mouth Daily with lunch       metoprolol succinate (TOPROL XL) 25 MG extended release tablet Take 25 mg by mouth daily      zonisamide (ZONEGRAN) 100 MG capsule Take 100 mg by mouth daily         Physical Exam:    General: Patient appears in no acute distress, cooperative  Skin: no new rashes or erythema or induration  Obese patient. HEENT: EOMI, MMM, Neck is supple  Heart: S1 S2  Lungs: bilaterally clear to auscultation  Abdomen: soft, ND, NTTP, +BS  Extrem:+RLE stump erythema improving an there is some drainage from the necrotic area  Neuro exam: CN II-XII intact, facial expressions symmertrical bilaterally, no slurred speech      Labs: I have reviewed all lab results by electronic record, including most recent CBC, metabolic panel, and pertinent abnormalities were addressed from an infectious disease perspective. WBC trends are being monitored.     Lab Results   Component Value Date     06/09/2019    K 3.9 06/09/2019     06/09/2019    CO2 22 06/09/2019    BUN 4 06/09/2019 CREATININE 0.39 06/09/2019    GLUCOSE 91 06/09/2019    GLUCOSE 94 12/28/2011    CALCIUM 8.5 06/09/2019      Lab Results   Component Value Date    WBC 6.3 06/09/2019    HGB 12.8 06/09/2019    HCT 37.5 06/09/2019    .8 (H) 06/09/2019     06/09/2019       Radiology:   I have reviewed imaging results per electronic record and most pertinent abnormalities are being addressed from an infectious disease standpoint. Assessment:  R BKA wound 6x4cm  Obese patient  Cellulitis of RLE      Plan:  Wound culture. Blood cultures are negative to date  Vascular on consult  Nica for now.    Planned intervention 6/11      Martha Rick D.O.

## 2019-06-10 LAB
ANION GAP SERPL CALCULATED.3IONS-SCNC: 9 MEQ/L (ref 9–15)
BASOPHILS ABSOLUTE: 0 K/UL (ref 0–0.2)
BASOPHILS RELATIVE PERCENT: 0.8 %
BUN BLDV-MCNC: 6 MG/DL (ref 6–20)
CALCIUM SERPL-MCNC: 8.3 MG/DL (ref 8.5–9.9)
CHLORIDE BLD-SCNC: 109 MEQ/L (ref 95–107)
CO2: 26 MEQ/L (ref 20–31)
CREAT SERPL-MCNC: 0.4 MG/DL (ref 0.5–0.9)
EOSINOPHILS ABSOLUTE: 0.2 K/UL (ref 0–0.7)
EOSINOPHILS RELATIVE PERCENT: 3.3 %
GFR AFRICAN AMERICAN: >60
GFR NON-AFRICAN AMERICAN: >60
GLUCOSE BLD-MCNC: 103 MG/DL (ref 60–115)
GLUCOSE BLD-MCNC: 108 MG/DL (ref 60–115)
GLUCOSE BLD-MCNC: 123 MG/DL (ref 60–115)
GLUCOSE BLD-MCNC: 85 MG/DL (ref 60–115)
GLUCOSE BLD-MCNC: 86 MG/DL (ref 70–99)
HCT VFR BLD CALC: 35.9 % (ref 37–47)
HEMOGLOBIN: 12.3 G/DL (ref 12–16)
LACTIC ACID: 0.9 MMOL/L (ref 0.5–2.2)
LYMPHOCYTES ABSOLUTE: 1.9 K/UL (ref 1–4.8)
LYMPHOCYTES RELATIVE PERCENT: 30.1 %
MCH RBC QN AUTO: 34.7 PG (ref 27–31.3)
MCHC RBC AUTO-ENTMCNC: 34.3 % (ref 33–37)
MCV RBC AUTO: 101.1 FL (ref 82–100)
MONOCYTES ABSOLUTE: 0.6 K/UL (ref 0.2–0.8)
MONOCYTES RELATIVE PERCENT: 10.2 %
NEUTROPHILS ABSOLUTE: 3.4 K/UL (ref 1.4–6.5)
NEUTROPHILS RELATIVE PERCENT: 55.6 %
PDW BLD-RTO: 12.8 % (ref 11.5–14.5)
PERFORMED ON: ABNORMAL
PERFORMED ON: NORMAL
PLATELET # BLD: 170 K/UL (ref 130–400)
POTASSIUM REFLEX MAGNESIUM: 3.8 MEQ/L (ref 3.4–4.9)
RBC # BLD: 3.55 M/UL (ref 4.2–5.4)
SODIUM BLD-SCNC: 144 MEQ/L (ref 135–144)
WBC # BLD: 6.2 K/UL (ref 4.8–10.8)

## 2019-06-10 PROCEDURE — 6370000000 HC RX 637 (ALT 250 FOR IP): Performed by: INTERNAL MEDICINE

## 2019-06-10 PROCEDURE — 2580000003 HC RX 258: Performed by: PHYSICIAN ASSISTANT

## 2019-06-10 PROCEDURE — 85025 COMPLETE CBC W/AUTO DIFF WBC: CPT

## 2019-06-10 PROCEDURE — 99232 SBSQ HOSP IP/OBS MODERATE 35: CPT | Performed by: INTERNAL MEDICINE

## 2019-06-10 PROCEDURE — 1210000000 HC MED SURG R&B

## 2019-06-10 PROCEDURE — 6360000002 HC RX W HCPCS: Performed by: PHYSICIAN ASSISTANT

## 2019-06-10 PROCEDURE — 99211 OFF/OP EST MAY X REQ PHY/QHP: CPT

## 2019-06-10 PROCEDURE — 6360000002 HC RX W HCPCS: Performed by: INTERNAL MEDICINE

## 2019-06-10 PROCEDURE — 36415 COLL VENOUS BLD VENIPUNCTURE: CPT

## 2019-06-10 PROCEDURE — 83605 ASSAY OF LACTIC ACID: CPT

## 2019-06-10 PROCEDURE — 80048 BASIC METABOLIC PNL TOTAL CA: CPT

## 2019-06-10 RX ADMIN — OXYCODONE HYDROCHLORIDE AND ACETAMINOPHEN 1 TABLET: 5; 325 TABLET ORAL at 20:29

## 2019-06-10 RX ADMIN — METOPROLOL SUCCINATE 25 MG: 25 TABLET, EXTENDED RELEASE ORAL at 11:39

## 2019-06-10 RX ADMIN — VANCOMYCIN HYDROCHLORIDE 1500 MG: 5 INJECTION, POWDER, LYOPHILIZED, FOR SOLUTION INTRAVENOUS at 13:12

## 2019-06-10 RX ADMIN — PHENYTOIN SODIUM 200 MG: 100 CAPSULE ORAL at 11:40

## 2019-06-10 RX ADMIN — OXYCODONE HYDROCHLORIDE AND ACETAMINOPHEN 1 TABLET: 5; 325 TABLET ORAL at 07:25

## 2019-06-10 RX ADMIN — PIPERACILLIN SODIUM,TAZOBACTAM SODIUM 3.38 G: 3; .375 INJECTION, POWDER, FOR SOLUTION INTRAVENOUS at 19:46

## 2019-06-10 RX ADMIN — SODIUM CHLORIDE: 9 INJECTION, SOLUTION INTRAVENOUS at 07:08

## 2019-06-10 RX ADMIN — ATORVASTATIN CALCIUM 10 MG: 10 TABLET, FILM COATED ORAL at 20:28

## 2019-06-10 RX ADMIN — VITAMIN D, TAB 1000IU (100/BT) 1000 UNITS: 25 TAB at 11:39

## 2019-06-10 RX ADMIN — MORPHINE SULFATE 4 MG: 4 INJECTION INTRAVENOUS at 23:16

## 2019-06-10 RX ADMIN — TRAZODONE HYDROCHLORIDE 50 MG: 50 TABLET ORAL at 20:28

## 2019-06-10 RX ADMIN — DIVALPROEX SODIUM 125 MG: 125 CAPSULE, COATED PELLETS ORAL at 13:24

## 2019-06-10 RX ADMIN — ASPIRIN 81 MG 81 MG: 81 TABLET ORAL at 11:41

## 2019-06-10 RX ADMIN — POTASSIUM CHLORIDE 10 MEQ: 750 CAPSULE, EXTENDED RELEASE ORAL at 11:41

## 2019-06-10 RX ADMIN — DULOXETINE HYDROCHLORIDE 30 MG: 30 CAPSULE, DELAYED RELEASE ORAL at 11:39

## 2019-06-10 RX ADMIN — DIVALPROEX SODIUM 125 MG: 125 CAPSULE, COATED PELLETS ORAL at 20:28

## 2019-06-10 RX ADMIN — PHENYTOIN SODIUM 100 MG: 100 CAPSULE ORAL at 13:27

## 2019-06-10 RX ADMIN — FUROSEMIDE 40 MG: 40 TABLET ORAL at 11:39

## 2019-06-10 RX ADMIN — PIPERACILLIN SODIUM,TAZOBACTAM SODIUM 3.38 G: 3; .375 INJECTION, POWDER, FOR SOLUTION INTRAVENOUS at 02:30

## 2019-06-10 RX ADMIN — DOCUSATE SODIUM 100 MG: 100 CAPSULE, LIQUID FILLED ORAL at 11:41

## 2019-06-10 RX ADMIN — Medication 10 ML: at 20:29

## 2019-06-10 RX ADMIN — PHENYTOIN SODIUM 200 MG: 100 CAPSULE ORAL at 20:29

## 2019-06-10 RX ADMIN — ZONISAMIDE 100 MG: 100 CAPSULE ORAL at 11:39

## 2019-06-10 RX ADMIN — MORPHINE SULFATE 4 MG: 4 INJECTION INTRAVENOUS at 11:59

## 2019-06-10 RX ADMIN — DIVALPROEX SODIUM 125 MG: 125 CAPSULE, COATED PELLETS ORAL at 11:43

## 2019-06-10 RX ADMIN — Medication 10 ML: at 13:26

## 2019-06-10 ASSESSMENT — PAIN SCALES - GENERAL
PAINLEVEL_OUTOF10: 10
PAINLEVEL_OUTOF10: 8
PAINLEVEL_OUTOF10: 10

## 2019-06-10 NOTE — FLOWSHEET NOTE
Patient has been up in the chair all day with no complaints offered. Assisted up to [facility] with very little assistance. Tolerated well. On Vanco 1500mg.q12 hrs and Zosyn q8hrs. But both very started late due to medication not available. Some scattered rhonchi noted in lungs. 2+ edema in the left lower leg. Right stump very reddened and swollen. Had BM today.

## 2019-06-10 NOTE — PROGRESS NOTES
Physician Progress Note    6/10/2019   11:45 AM    Name:  Moe Espana  MRN:    [id number]     [address] [facility] Day: 3     Admit Date: 2019 10:56 AM  PCP: Sharif Franz MD    Code Status:  Full Code    Subjective:     Has pain at the right BKA wound. New IV is being placed to give morphine. Physical Examination:      Vitals:  BP (!) 126/43   Pulse 74   Temp 98.2 °F (36.8 °C) (Oral)   Resp 12   Ht 5' 2\" (1.575 m)   Wt 200 lb (90.7 kg)   SpO2 97%   BMI 36.58 kg/m²   Temp (24hrs), Av.4 °F (36.9 °C), Min:98.2 °F (36.8 °C), Max:98.6 °F (37 °C)      General appearance: alert, cooperative and no distress  Mental Status: oriented to person, place and time and normal affect  Lungs: clear to auscultation bilaterally, normal effort  Heart: regular rate and rhythm, no murmur  Abdomen: soft, nontender, nondistended, bowel sounds present, no masses  Extremities: no edema, redness, tenderness in the calves  Skin: right stump with erythema and necrotic 5x5 cm wounds        Data:     Labs:  Recent Labs     19  0753 06/10/19  0715   WBC 6.3 6.2   HGB 12.8 12.3    170     Recent Labs     19  0753 06/10/19  0715    144   K 3.9 3.8   * 109*   CO2 22 26   BUN 4* 6   CREATININE 0.39* 0.40*   GLUCOSE 91 86     No results for input(s): AST, ALT, ALB, BILITOT, ALKPHOS in the last 72 hours.     Current Facility-Administered Medications   Medication Dose Route Frequency Provider Last Rate Last Dose    vancomycin (VANCOCIN) 1,500 mg in dextrose 5 % 500 mL IVPB  1,500 mg Intravenous Q12H EVELYN Long   Stopped at 06/10/19 0130    vancomycin (VANCOCIN) intermittent dosing (placeholder)   Other RX Placeholder Sheridan Jama        sodium chloride flush 0.9 % injection 10 mL  10 mL Intravenous 2 times per day EVELYN Mckeon   10 mL at 19 0918    sodium chloride flush 0.9 % injection 10 mL  10 mL Intravenous PRN Sheridan Mckeon        magnesium hydroxide (MILK OF MAGNESIA) 400 MG/5ML suspension 30 mL  30 mL Oral Daily PRN DelayEVELYN Causey        ondansetron [facility]) injection 4 mg  4 mg Intravenous Q6H PRN DelayEVELYN Dao        enoxaparin (LOVENOX) injection 40 mg  40 mg Subcutaneous Daily DelaySheridan Dao   40 mg at 06/09/19 2100    0.9 % sodium chloride infusion   Intravenous Continuous Sheridan Cantor 75 mL/hr at 06/10/19 0708      piperacillin-tazobactam (ZOSYN) 3.375 g in dextrose 5 % 50 mL IVPB extended infusion (mini-bag)  3.375 g Intravenous Q8H EVELYN Cantor   Stopped at 06/10/19 0700    insulin lispro (HUMALOG) injection vial 0-12 Units  0-12 Units Subcutaneous TID R Sunni [address] 23 Sheridan Cantor        insulin lispro (HUMALOG) injection vial 0-6 Units  0-6 Units Subcutaneous Nightly DelaySheridan Dao   1 Units at 06/08/19 2314    acetaminophen (TYLENOL) tablet 650 mg  650 mg Oral Q4H PRN Clearence Dasen, DO        oxyCODONE-acetaminophen (PERCOCET) 5-325 MG per tablet 1 tablet  1 tablet Oral Q6H PRN Clearence Dasen, DO   1 tablet at 06/10/19 0725    morphine injection 4 mg  4 mg Intravenous Q4H PRN Clearence Dasen, DO   4 mg at 06/09/19 2128    aspirin chewable tablet 81 mg  81 mg Oral Daily Clearence Dasen, DO   81 mg at 06/10/19 1141    atorvastatin (LIPITOR) tablet 10 mg  10 mg Oral Nightly Clearence Dasen, DO   10 mg at 06/09/19 2052    divalproex (DEPAKOTE SPRINKLE) capsule 125 mg  125 mg Oral TID Clearence Dasen, DO   125 mg at 06/10/19 1143    docusate sodium (COLACE) capsule 100 mg  100 mg Oral QAM Yazid R Manohar, DO   100 mg at 06/10/19 1141    DULoxetine (CYMBALTA) extended release capsule 30 mg  30 mg Oral Daily Clearence Dasen, DO   30 mg at 06/10/19 1139    furosemide (LASIX) tablet 40 mg  40 mg Oral Daily Clearence Dasen, DO   40 mg at 06/10/19 1139    ipratropium-albuterol (DUONEB) nebulizer solution 3 mL  1 vial Inhalation Q8H PRN Clearence Dasen, DO        LORazepam

## 2019-06-10 NOTE — CARE COORDINATION
Pt is from [facility] and can return when indicated. Attempted phone call made to [address] [facility]. No contact at this time.

## 2019-06-10 NOTE — PROGRESS NOTES
Wound Ostomy Continence Nursing    This [id number] [facility] Nurse received referral for evaluation of the Right Stump - patient admitted with wound infection. Per Shauna Ortiz, Dr. Merlinda Babe plans to take patient to surgery tomorrow. This [id number] [facility] Nurse will follow as needed for post-op wound care needs. Patient has pressure injury prevention interventions in place.     Electronically signed by EAN Quarles, RN, Yasmeen Fontaine on 6/10/2019 at 10:28 AM

## 2019-06-11 ENCOUNTER — ANESTHESIA (OUTPATIENT)
Dept: OPERATING ROOM | Age: 59
DRG: 950 | End: 2019-06-11
Payer: MEDICAID

## 2019-06-11 ENCOUNTER — ANESTHESIA EVENT (OUTPATIENT)
Dept: OPERATING ROOM | Age: 59
DRG: 950 | End: 2019-06-11
Payer: MEDICAID

## 2019-06-11 ENCOUNTER — APPOINTMENT (OUTPATIENT)
Dept: GENERAL RADIOLOGY | Age: 59
DRG: 950 | End: 2019-06-11
Payer: MEDICAID

## 2019-06-11 VITALS — TEMPERATURE: 96.6 F | OXYGEN SATURATION: 100 %

## 2019-06-11 LAB
ABO/RH: NORMAL
ANION GAP SERPL CALCULATED.3IONS-SCNC: 15 MEQ/L (ref 9–15)
ANTIBODY SCREEN: NORMAL
BASOPHILS ABSOLUTE: 0 K/UL (ref 0–0.2)
BASOPHILS RELATIVE PERCENT: 0.6 %
BUN BLDV-MCNC: 6 MG/DL (ref 6–20)
CALCIUM SERPL-MCNC: 8.6 MG/DL (ref 8.5–9.9)
CHLORIDE BLD-SCNC: 107 MEQ/L (ref 95–107)
CO2: 21 MEQ/L (ref 20–31)
CREAT SERPL-MCNC: 0.38 MG/DL (ref 0.5–0.9)
EOSINOPHILS ABSOLUTE: 0.2 K/UL (ref 0–0.7)
EOSINOPHILS RELATIVE PERCENT: 3.4 %
GFR AFRICAN AMERICAN: >60
GFR NON-AFRICAN AMERICAN: >60
GLUCOSE BLD-MCNC: 121 MG/DL (ref 60–115)
GLUCOSE BLD-MCNC: 126 MG/DL (ref 60–115)
GLUCOSE BLD-MCNC: 178 MG/DL (ref 60–115)
GLUCOSE BLD-MCNC: 99 MG/DL (ref 70–99)
HCT VFR BLD CALC: 41.2 % (ref 37–47)
HEMOGLOBIN: 14.1 G/DL (ref 12–16)
LACTIC ACID: 0.9 MMOL/L (ref 0.5–2.2)
LYMPHOCYTES ABSOLUTE: 1.5 K/UL (ref 1–4.8)
LYMPHOCYTES RELATIVE PERCENT: 23.2 %
MCH RBC QN AUTO: 34.6 PG (ref 27–31.3)
MCHC RBC AUTO-ENTMCNC: 34.1 % (ref 33–37)
MCV RBC AUTO: 101.4 FL (ref 82–100)
MONOCYTES ABSOLUTE: 0.7 K/UL (ref 0.2–0.8)
MONOCYTES RELATIVE PERCENT: 10.9 %
NEUTROPHILS ABSOLUTE: 3.9 K/UL (ref 1.4–6.5)
NEUTROPHILS RELATIVE PERCENT: 61.9 %
PDW BLD-RTO: 13.3 % (ref 11.5–14.5)
PERFORMED ON: ABNORMAL
PLATELET # BLD: 193 K/UL (ref 130–400)
POTASSIUM REFLEX MAGNESIUM: 4 MEQ/L (ref 3.4–4.9)
RBC # BLD: 4.07 M/UL (ref 4.2–5.4)
SODIUM BLD-SCNC: 143 MEQ/L (ref 135–144)
WBC # BLD: 6.3 K/UL (ref 4.8–10.8)

## 2019-06-11 PROCEDURE — 2580000003 HC RX 258: Performed by: THORACIC SURGERY (CARDIOTHORACIC VASCULAR SURGERY)

## 2019-06-11 PROCEDURE — 0JBP0ZZ EXCISION OF LEFT LOWER LEG SUBCUTANEOUS TISSUE AND FASCIA, OPEN APPROACH: ICD-10-PCS | Performed by: THORACIC SURGERY (CARDIOTHORACIC VASCULAR SURGERY)

## 2019-06-11 PROCEDURE — 041K0ZH BYPASS RIGHT FEMORAL ARTERY TO RIGHT FEMORAL ARTERY, OPEN APPROACH: ICD-10-PCS | Performed by: THORACIC SURGERY (CARDIOTHORACIC VASCULAR SURGERY)

## 2019-06-11 PROCEDURE — 6360000004 HC RX CONTRAST MEDICATION: Performed by: THORACIC SURGERY (CARDIOTHORACIC VASCULAR SURGERY)

## 2019-06-11 PROCEDURE — 6370000000 HC RX 637 (ALT 250 FOR IP): Performed by: INTERNAL MEDICINE

## 2019-06-11 PROCEDURE — 6360000002 HC RX W HCPCS: Performed by: INTERNAL MEDICINE

## 2019-06-11 PROCEDURE — 80048 BASIC METABOLIC PNL TOTAL CA: CPT

## 2019-06-11 PROCEDURE — 83605 ASSAY OF LACTIC ACID: CPT

## 2019-06-11 PROCEDURE — 86850 RBC ANTIBODY SCREEN: CPT

## 2019-06-11 PROCEDURE — 2000000000 HC ICU R&B

## 2019-06-11 PROCEDURE — 6360000002 HC RX W HCPCS: Performed by: THORACIC SURGERY (CARDIOTHORACIC VASCULAR SURGERY)

## 2019-06-11 PROCEDURE — 2709999900 HC NON-CHARGEABLE SUPPLY: Performed by: THORACIC SURGERY (CARDIOTHORACIC VASCULAR SURGERY)

## 2019-06-11 PROCEDURE — 2580000003 HC RX 258

## 2019-06-11 PROCEDURE — 6370000000 HC RX 637 (ALT 250 FOR IP)

## 2019-06-11 PROCEDURE — 3600000005 HC SURGERY LEVEL 5 BASE: Performed by: THORACIC SURGERY (CARDIOTHORACIC VASCULAR SURGERY)

## 2019-06-11 PROCEDURE — C1769 GUIDE WIRE: HCPCS | Performed by: THORACIC SURGERY (CARDIOTHORACIC VASCULAR SURGERY)

## 2019-06-11 PROCEDURE — C1887 CATHETER, GUIDING: HCPCS | Performed by: THORACIC SURGERY (CARDIOTHORACIC VASCULAR SURGERY)

## 2019-06-11 PROCEDURE — 6360000002 HC RX W HCPCS: Performed by: NURSE ANESTHETIST, CERTIFIED REGISTERED

## 2019-06-11 PROCEDURE — 99232 SBSQ HOSP IP/OBS MODERATE 35: CPT | Performed by: INTERNAL MEDICINE

## 2019-06-11 PROCEDURE — 86900 BLOOD TYPING SEROLOGIC ABO: CPT

## 2019-06-11 PROCEDURE — 6370000000 HC RX 637 (ALT 250 FOR IP): Performed by: THORACIC SURGERY (CARDIOTHORACIC VASCULAR SURGERY)

## 2019-06-11 PROCEDURE — 3700000001 HC ADD 15 MINUTES (ANESTHESIA): Performed by: THORACIC SURGERY (CARDIOTHORACIC VASCULAR SURGERY)

## 2019-06-11 PROCEDURE — 36415 COLL VENOUS BLD VENIPUNCTURE: CPT

## 2019-06-11 PROCEDURE — 3700000000 HC ANESTHESIA ATTENDED CARE: Performed by: THORACIC SURGERY (CARDIOTHORACIC VASCULAR SURGERY)

## 2019-06-11 PROCEDURE — C1768 GRAFT, VASCULAR: HCPCS | Performed by: THORACIC SURGERY (CARDIOTHORACIC VASCULAR SURGERY)

## 2019-06-11 PROCEDURE — 2580000003 HC RX 258: Performed by: NURSE ANESTHETIST, CERTIFIED REGISTERED

## 2019-06-11 PROCEDURE — 7100000000 HC PACU RECOVERY - FIRST 15 MIN

## 2019-06-11 PROCEDURE — 88304 TISSUE EXAM BY PATHOLOGIST: CPT

## 2019-06-11 PROCEDURE — B41D1ZZ FLUOROSCOPY OF AORTA AND BILATERAL LOWER EXTREMITY ARTERIES USING LOW OSMOLAR CONTRAST: ICD-10-PCS | Performed by: THORACIC SURGERY (CARDIOTHORACIC VASCULAR SURGERY)

## 2019-06-11 PROCEDURE — 85025 COMPLETE CBC W/AUTO DIFF WBC: CPT

## 2019-06-11 PROCEDURE — 3600000015 HC SURGERY LEVEL 5 ADDTL 15MIN: Performed by: THORACIC SURGERY (CARDIOTHORACIC VASCULAR SURGERY)

## 2019-06-11 PROCEDURE — 86901 BLOOD TYPING SEROLOGIC RH(D): CPT

## 2019-06-11 PROCEDURE — 7100000001 HC PACU RECOVERY - ADDTL 15 MIN

## 2019-06-11 PROCEDURE — 2500000003 HC RX 250 WO HCPCS: Performed by: NURSE ANESTHETIST, CERTIFIED REGISTERED

## 2019-06-11 PROCEDURE — 6360000002 HC RX W HCPCS: Performed by: PHYSICIAN ASSISTANT

## 2019-06-11 PROCEDURE — C1757 CATH, THROMBECTOMY/EMBOLECT: HCPCS | Performed by: THORACIC SURGERY (CARDIOTHORACIC VASCULAR SURGERY)

## 2019-06-11 PROCEDURE — 2580000003 HC RX 258: Performed by: PHYSICIAN ASSISTANT

## 2019-06-11 PROCEDURE — 76000 FLUOROSCOPY <1 HR PHYS/QHP: CPT

## 2019-06-11 PROCEDURE — 2580000003 HC RX 258: Performed by: INTERNAL MEDICINE

## 2019-06-11 PROCEDURE — C1894 INTRO/SHEATH, NON-LASER: HCPCS | Performed by: THORACIC SURGERY (CARDIOTHORACIC VASCULAR SURGERY)

## 2019-06-11 DEVICE — GRAFT VASC L45CM DIA4-7MM PTFE CBAS HEP SURF STD WALLED: Type: IMPLANTABLE DEVICE | Site: ARM | Status: FUNCTIONAL

## 2019-06-11 RX ORDER — SODIUM CHLORIDE 0.9 % (FLUSH) 0.9 %
10 SYRINGE (ML) INJECTION PRN
Status: DISCONTINUED | OUTPATIENT
Start: 2019-06-11 | End: 2019-06-14 | Stop reason: HOSPADM

## 2019-06-11 RX ORDER — HEPARIN SODIUM 1000 [USP'U]/ML
INJECTION, SOLUTION INTRAVENOUS; SUBCUTANEOUS PRN
Status: DISCONTINUED | OUTPATIENT
Start: 2019-06-11 | End: 2019-06-11 | Stop reason: SDUPTHER

## 2019-06-11 RX ORDER — ACETAMINOPHEN 325 MG/1
650 TABLET ORAL EVERY 4 HOURS PRN
Status: DISCONTINUED | OUTPATIENT
Start: 2019-06-11 | End: 2019-06-14 | Stop reason: HOSPADM

## 2019-06-11 RX ORDER — ONDANSETRON 2 MG/ML
INJECTION INTRAMUSCULAR; INTRAVENOUS PRN
Status: DISCONTINUED | OUTPATIENT
Start: 2019-06-11 | End: 2019-06-11 | Stop reason: SDUPTHER

## 2019-06-11 RX ORDER — SODIUM CHLORIDE 9 MG/ML
500 INJECTION, SOLUTION INTRAVENOUS CONTINUOUS
Status: DISCONTINUED | OUTPATIENT
Start: 2019-06-11 | End: 2019-06-11

## 2019-06-11 RX ORDER — PHENYTOIN SODIUM 100 MG/1
200 CAPSULE, EXTENDED RELEASE ORAL 2 TIMES DAILY
Status: DISCONTINUED | OUTPATIENT
Start: 2019-06-11 | End: 2019-06-14 | Stop reason: HOSPADM

## 2019-06-11 RX ORDER — SODIUM CHLORIDE, SODIUM LACTATE, POTASSIUM CHLORIDE, CALCIUM CHLORIDE 600; 310; 30; 20 MG/100ML; MG/100ML; MG/100ML; MG/100ML
INJECTION, SOLUTION INTRAVENOUS CONTINUOUS
Status: DISCONTINUED | OUTPATIENT
Start: 2019-06-11 | End: 2019-06-11

## 2019-06-11 RX ORDER — DIVALPROEX SODIUM 125 MG/1
125 CAPSULE, COATED PELLETS ORAL 3 TIMES DAILY
Status: DISCONTINUED | OUTPATIENT
Start: 2019-06-11 | End: 2019-06-14 | Stop reason: HOSPADM

## 2019-06-11 RX ORDER — SODIUM CHLORIDE 0.9 % (FLUSH) 0.9 %
10 SYRINGE (ML) INJECTION EVERY 12 HOURS SCHEDULED
Status: DISCONTINUED | OUTPATIENT
Start: 2019-06-11 | End: 2019-06-14 | Stop reason: HOSPADM

## 2019-06-11 RX ORDER — LIDOCAINE HYDROCHLORIDE 20 MG/ML
INJECTION, SOLUTION INTRAVENOUS PRN
Status: DISCONTINUED | OUTPATIENT
Start: 2019-06-11 | End: 2019-06-11 | Stop reason: SDUPTHER

## 2019-06-11 RX ORDER — DEXAMETHASONE SODIUM PHOSPHATE 10 MG/ML
INJECTION INTRAMUSCULAR; INTRAVENOUS PRN
Status: DISCONTINUED | OUTPATIENT
Start: 2019-06-11 | End: 2019-06-11 | Stop reason: SDUPTHER

## 2019-06-11 RX ORDER — EPHEDRINE SULFATE/0.9% NACL/PF 50 MG/5 ML
SYRINGE (ML) INTRAVENOUS PRN
Status: DISCONTINUED | OUTPATIENT
Start: 2019-06-11 | End: 2019-06-11 | Stop reason: SDUPTHER

## 2019-06-11 RX ORDER — TRAZODONE HYDROCHLORIDE 50 MG/1
50 TABLET ORAL NIGHTLY
Status: DISCONTINUED | OUTPATIENT
Start: 2019-06-11 | End: 2019-06-14 | Stop reason: HOSPADM

## 2019-06-11 RX ORDER — HYDROCODONE BITARTRATE AND ACETAMINOPHEN 5; 325 MG/1; MG/1
2 TABLET ORAL PRN
Status: DISCONTINUED | OUTPATIENT
Start: 2019-06-11 | End: 2019-06-11 | Stop reason: HOSPADM

## 2019-06-11 RX ORDER — METOCLOPRAMIDE HYDROCHLORIDE 5 MG/ML
10 INJECTION INTRAMUSCULAR; INTRAVENOUS
Status: DISCONTINUED | OUTPATIENT
Start: 2019-06-11 | End: 2019-06-11 | Stop reason: HOSPADM

## 2019-06-11 RX ORDER — ZONISAMIDE 100 MG/1
100 CAPSULE ORAL DAILY
Status: DISCONTINUED | OUTPATIENT
Start: 2019-06-11 | End: 2019-06-14 | Stop reason: HOSPADM

## 2019-06-11 RX ORDER — IPRATROPIUM BROMIDE AND ALBUTEROL SULFATE 2.5; .5 MG/3ML; MG/3ML
1 SOLUTION RESPIRATORY (INHALATION) EVERY 8 HOURS PRN
Status: DISCONTINUED | OUTPATIENT
Start: 2019-06-11 | End: 2019-06-14 | Stop reason: HOSPADM

## 2019-06-11 RX ORDER — MAGNESIUM HYDROXIDE 1200 MG/15ML
LIQUID ORAL CONTINUOUS PRN
Status: COMPLETED | OUTPATIENT
Start: 2019-06-11 | End: 2019-06-11

## 2019-06-11 RX ORDER — LORAZEPAM 0.5 MG/1
0.25 TABLET ORAL EVERY 8 HOURS PRN
Status: DISCONTINUED | OUTPATIENT
Start: 2019-06-11 | End: 2019-06-14 | Stop reason: HOSPADM

## 2019-06-11 RX ORDER — SODIUM CHLORIDE 0.9 % (FLUSH) 0.9 %
10 SYRINGE (ML) INJECTION PRN
Status: DISCONTINUED | OUTPATIENT
Start: 2019-06-11 | End: 2019-06-11 | Stop reason: SDUPTHER

## 2019-06-11 RX ORDER — METOPROLOL SUCCINATE 25 MG/1
25 TABLET, EXTENDED RELEASE ORAL DAILY
Status: DISCONTINUED | OUTPATIENT
Start: 2019-06-11 | End: 2019-06-14 | Stop reason: HOSPADM

## 2019-06-11 RX ORDER — ROCURONIUM BROMIDE 10 MG/ML
INJECTION, SOLUTION INTRAVENOUS PRN
Status: DISCONTINUED | OUTPATIENT
Start: 2019-06-11 | End: 2019-06-11 | Stop reason: SDUPTHER

## 2019-06-11 RX ORDER — FENTANYL CITRATE 50 UG/ML
INJECTION, SOLUTION INTRAMUSCULAR; INTRAVENOUS PRN
Status: DISCONTINUED | OUTPATIENT
Start: 2019-06-11 | End: 2019-06-11 | Stop reason: SDUPTHER

## 2019-06-11 RX ORDER — DIPHENHYDRAMINE HYDROCHLORIDE 50 MG/ML
12.5 INJECTION INTRAMUSCULAR; INTRAVENOUS
Status: DISCONTINUED | OUTPATIENT
Start: 2019-06-11 | End: 2019-06-11 | Stop reason: HOSPADM

## 2019-06-11 RX ORDER — DEXTROSE AND SODIUM CHLORIDE 5; .45 G/100ML; G/100ML
INJECTION, SOLUTION INTRAVENOUS
Status: COMPLETED
Start: 2019-06-11 | End: 2019-06-11

## 2019-06-11 RX ORDER — ACETAMINOPHEN 325 MG/1
650 TABLET ORAL EVERY 4 HOURS PRN
Status: DISCONTINUED | OUTPATIENT
Start: 2019-06-11 | End: 2019-06-11 | Stop reason: SDUPTHER

## 2019-06-11 RX ORDER — MORPHINE SULFATE 4 MG/ML
4 INJECTION, SOLUTION INTRAMUSCULAR; INTRAVENOUS EVERY 4 HOURS PRN
Status: DISCONTINUED | OUTPATIENT
Start: 2019-06-11 | End: 2019-06-14 | Stop reason: HOSPADM

## 2019-06-11 RX ORDER — SODIUM CHLORIDE 9 MG/ML
INJECTION, SOLUTION INTRAVENOUS
Status: COMPLETED
Start: 2019-06-11 | End: 2019-06-11

## 2019-06-11 RX ORDER — ATORVASTATIN CALCIUM 10 MG/1
10 TABLET, FILM COATED ORAL NIGHTLY
Status: DISCONTINUED | OUTPATIENT
Start: 2019-06-11 | End: 2019-06-14 | Stop reason: HOSPADM

## 2019-06-11 RX ORDER — SODIUM CHLORIDE 9 MG/ML
INJECTION, SOLUTION INTRAVENOUS CONTINUOUS
Status: DISCONTINUED | OUTPATIENT
Start: 2019-06-11 | End: 2019-06-12

## 2019-06-11 RX ORDER — DEXTROSE AND SODIUM CHLORIDE 5; .45 G/100ML; G/100ML
INJECTION, SOLUTION INTRAVENOUS CONTINUOUS
Status: DISCONTINUED | OUTPATIENT
Start: 2019-06-11 | End: 2019-06-11

## 2019-06-11 RX ORDER — ONDANSETRON 2 MG/ML
4 INJECTION INTRAMUSCULAR; INTRAVENOUS EVERY 6 HOURS PRN
Status: DISCONTINUED | OUTPATIENT
Start: 2019-06-11 | End: 2019-06-14 | Stop reason: HOSPADM

## 2019-06-11 RX ORDER — HYDROCODONE BITARTRATE AND ACETAMINOPHEN 5; 325 MG/1; MG/1
1 TABLET ORAL PRN
Status: DISCONTINUED | OUTPATIENT
Start: 2019-06-11 | End: 2019-06-11 | Stop reason: HOSPADM

## 2019-06-11 RX ORDER — PHENYTOIN SODIUM 100 MG/1
100 CAPSULE, EXTENDED RELEASE ORAL
Status: DISCONTINUED | OUTPATIENT
Start: 2019-06-11 | End: 2019-06-14 | Stop reason: HOSPADM

## 2019-06-11 RX ORDER — PROPOFOL 10 MG/ML
INJECTION, EMULSION INTRAVENOUS PRN
Status: DISCONTINUED | OUTPATIENT
Start: 2019-06-11 | End: 2019-06-11 | Stop reason: SDUPTHER

## 2019-06-11 RX ORDER — FUROSEMIDE 40 MG/1
40 TABLET ORAL DAILY
Status: DISCONTINUED | OUTPATIENT
Start: 2019-06-11 | End: 2019-06-14 | Stop reason: HOSPADM

## 2019-06-11 RX ORDER — MEPERIDINE HYDROCHLORIDE 25 MG/ML
12.5 INJECTION INTRAMUSCULAR; INTRAVENOUS; SUBCUTANEOUS EVERY 5 MIN PRN
Status: DISCONTINUED | OUTPATIENT
Start: 2019-06-11 | End: 2019-06-11 | Stop reason: HOSPADM

## 2019-06-11 RX ORDER — DULOXETIN HYDROCHLORIDE 30 MG/1
30 CAPSULE, DELAYED RELEASE ORAL DAILY
Status: DISCONTINUED | OUTPATIENT
Start: 2019-06-11 | End: 2019-06-14 | Stop reason: HOSPADM

## 2019-06-11 RX ORDER — DOCUSATE SODIUM 100 MG/1
100 CAPSULE, LIQUID FILLED ORAL EVERY MORNING
Status: DISCONTINUED | OUTPATIENT
Start: 2019-06-12 | End: 2019-06-14 | Stop reason: HOSPADM

## 2019-06-11 RX ORDER — FENTANYL CITRATE 50 UG/ML
50 INJECTION, SOLUTION INTRAMUSCULAR; INTRAVENOUS EVERY 10 MIN PRN
Status: DISCONTINUED | OUTPATIENT
Start: 2019-06-11 | End: 2019-06-11 | Stop reason: HOSPADM

## 2019-06-11 RX ORDER — MIDAZOLAM HYDROCHLORIDE 1 MG/ML
INJECTION INTRAMUSCULAR; INTRAVENOUS PRN
Status: DISCONTINUED | OUTPATIENT
Start: 2019-06-11 | End: 2019-06-11 | Stop reason: SDUPTHER

## 2019-06-11 RX ORDER — ONDANSETRON 2 MG/ML
4 INJECTION INTRAMUSCULAR; INTRAVENOUS
Status: DISCONTINUED | OUTPATIENT
Start: 2019-06-11 | End: 2019-06-11 | Stop reason: HOSPADM

## 2019-06-11 RX ADMIN — ROCURONIUM BROMIDE 20 MG: 10 INJECTION INTRAVENOUS at 12:06

## 2019-06-11 RX ADMIN — ROCURONIUM BROMIDE 20 MG: 10 INJECTION INTRAVENOUS at 10:20

## 2019-06-11 RX ADMIN — ROCURONIUM BROMIDE 20 MG: 10 INJECTION INTRAVENOUS at 12:00

## 2019-06-11 RX ADMIN — ROCURONIUM BROMIDE 10 MG: 10 INJECTION INTRAVENOUS at 09:55

## 2019-06-11 RX ADMIN — Medication 5 MG: at 09:31

## 2019-06-11 RX ADMIN — PIPERACILLIN SODIUM,TAZOBACTAM SODIUM 3.38 G: 3; .375 INJECTION, POWDER, FOR SOLUTION INTRAVENOUS at 10:29

## 2019-06-11 RX ADMIN — METOPROLOL SUCCINATE 25 MG: 25 TABLET, EXTENDED RELEASE ORAL at 15:53

## 2019-06-11 RX ADMIN — DIVALPROEX SODIUM 125 MG: 125 CAPSULE, COATED PELLETS ORAL at 15:53

## 2019-06-11 RX ADMIN — ROCURONIUM BROMIDE 20 MG: 10 INJECTION INTRAVENOUS at 10:27

## 2019-06-11 RX ADMIN — FENTANYL CITRATE 25 MCG: 50 INJECTION, SOLUTION INTRAMUSCULAR; INTRAVENOUS at 13:53

## 2019-06-11 RX ADMIN — ROCURONIUM BROMIDE 50 MG: 10 INJECTION INTRAVENOUS at 09:26

## 2019-06-11 RX ADMIN — PHENYLEPHRINE HYDROCHLORIDE 50 MCG: 10 INJECTION INTRAVENOUS at 10:31

## 2019-06-11 RX ADMIN — Medication 5 MG: at 11:03

## 2019-06-11 RX ADMIN — PROPOFOL 20 MG: 10 INJECTION, EMULSION INTRAVENOUS at 10:17

## 2019-06-11 RX ADMIN — ZONISAMIDE 100 MG: 100 CAPSULE ORAL at 15:53

## 2019-06-11 RX ADMIN — Medication 5 MG: at 12:38

## 2019-06-11 RX ADMIN — PIPERACILLIN SODIUM,TAZOBACTAM SODIUM 3.38 G: 3; .375 INJECTION, POWDER, FOR SOLUTION INTRAVENOUS at 15:54

## 2019-06-11 RX ADMIN — PROPOFOL 40 MG: 10 INJECTION, EMULSION INTRAVENOUS at 10:21

## 2019-06-11 RX ADMIN — SODIUM CHLORIDE: 9 INJECTION, SOLUTION INTRAVENOUS at 17:31

## 2019-06-11 RX ADMIN — Medication 5 MG: at 09:53

## 2019-06-11 RX ADMIN — HYDROMORPHONE HYDROCHLORIDE 0.5 MG: 1 INJECTION, SOLUTION INTRAMUSCULAR; INTRAVENOUS; SUBCUTANEOUS at 18:31

## 2019-06-11 RX ADMIN — FENTANYL CITRATE 50 MCG: 50 INJECTION, SOLUTION INTRAMUSCULAR; INTRAVENOUS at 10:21

## 2019-06-11 RX ADMIN — FENTANYL CITRATE 50 MCG: 50 INJECTION, SOLUTION INTRAMUSCULAR; INTRAVENOUS at 13:12

## 2019-06-11 RX ADMIN — PIPERACILLIN SODIUM,TAZOBACTAM SODIUM 3.38 G: 3; .375 INJECTION, POWDER, FOR SOLUTION INTRAVENOUS at 23:12

## 2019-06-11 RX ADMIN — Medication 5 MG: at 09:34

## 2019-06-11 RX ADMIN — FENTANYL CITRATE 25 MCG: 50 INJECTION, SOLUTION INTRAMUSCULAR; INTRAVENOUS at 13:57

## 2019-06-11 RX ADMIN — Medication 5 MG: at 10:06

## 2019-06-11 RX ADMIN — HYDROMORPHONE HYDROCHLORIDE 0.5 MG: 1 INJECTION, SOLUTION INTRAMUSCULAR; INTRAVENOUS; SUBCUTANEOUS at 14:21

## 2019-06-11 RX ADMIN — MIDAZOLAM HYDROCHLORIDE 2 MG: 1 INJECTION, SOLUTION INTRAMUSCULAR; INTRAVENOUS at 09:18

## 2019-06-11 RX ADMIN — SUGAMMADEX 200 MG: 100 INJECTION, SOLUTION INTRAVENOUS at 13:21

## 2019-06-11 RX ADMIN — Medication 10 ML: at 08:13

## 2019-06-11 RX ADMIN — Medication 5 MG: at 13:08

## 2019-06-11 RX ADMIN — ROCURONIUM BROMIDE 20 MG: 10 INJECTION INTRAVENOUS at 11:05

## 2019-06-11 RX ADMIN — PROPOFOL 150 MG: 10 INJECTION, EMULSION INTRAVENOUS at 09:25

## 2019-06-11 RX ADMIN — FENTANYL CITRATE 100 MCG: 50 INJECTION, SOLUTION INTRAMUSCULAR; INTRAVENOUS at 09:25

## 2019-06-11 RX ADMIN — PHENYLEPHRINE HYDROCHLORIDE 50 MCG: 10 INJECTION INTRAVENOUS at 13:16

## 2019-06-11 RX ADMIN — FENTANYL CITRATE 25 MCG: 50 INJECTION, SOLUTION INTRAMUSCULAR; INTRAVENOUS at 13:44

## 2019-06-11 RX ADMIN — VANCOMYCIN HYDROCHLORIDE 1500 MG: 5 INJECTION, POWDER, LYOPHILIZED, FOR SOLUTION INTRAVENOUS at 08:14

## 2019-06-11 RX ADMIN — FENTANYL CITRATE 25 MCG: 50 INJECTION, SOLUTION INTRAMUSCULAR; INTRAVENOUS at 13:48

## 2019-06-11 RX ADMIN — Medication 5 MG: at 10:59

## 2019-06-11 RX ADMIN — SODIUM CHLORIDE, POTASSIUM CHLORIDE, SODIUM LACTATE AND CALCIUM CHLORIDE: 600; 310; 30; 20 INJECTION, SOLUTION INTRAVENOUS at 09:18

## 2019-06-11 RX ADMIN — FENTANYL CITRATE 25 MCG: 50 INJECTION, SOLUTION INTRAMUSCULAR; INTRAVENOUS at 12:56

## 2019-06-11 RX ADMIN — ROCURONIUM BROMIDE 20 MG: 10 INJECTION INTRAVENOUS at 11:15

## 2019-06-11 RX ADMIN — ROCURONIUM BROMIDE 20 MG: 10 INJECTION INTRAVENOUS at 10:53

## 2019-06-11 RX ADMIN — SODIUM CHLORIDE: 9 INJECTION, SOLUTION INTRAVENOUS at 09:18

## 2019-06-11 RX ADMIN — PHENYTOIN SODIUM 100 MG: 100 CAPSULE ORAL at 15:53

## 2019-06-11 RX ADMIN — ROCURONIUM BROMIDE 10 MG: 10 INJECTION INTRAVENOUS at 12:21

## 2019-06-11 RX ADMIN — PROPOFOL 40 MG: 10 INJECTION, EMULSION INTRAVENOUS at 10:19

## 2019-06-11 RX ADMIN — FUROSEMIDE 40 MG: 40 TABLET ORAL at 15:53

## 2019-06-11 RX ADMIN — DEXTROSE AND SODIUM CHLORIDE: 5; 450 INJECTION, SOLUTION INTRAVENOUS at 14:18

## 2019-06-11 RX ADMIN — ROCURONIUM BROMIDE 20 MG: 10 INJECTION INTRAVENOUS at 10:36

## 2019-06-11 RX ADMIN — Medication 5 MG: at 10:57

## 2019-06-11 RX ADMIN — ONDANSETRON 4 MG: 2 INJECTION INTRAMUSCULAR; INTRAVENOUS at 13:17

## 2019-06-11 RX ADMIN — Medication 5 MG: at 10:45

## 2019-06-11 RX ADMIN — Medication 5 MG: at 11:02

## 2019-06-11 RX ADMIN — ROCURONIUM BROMIDE 20 MG: 10 INJECTION INTRAVENOUS at 10:10

## 2019-06-11 RX ADMIN — FENTANYL CITRATE 50 MCG: 50 INJECTION, SOLUTION INTRAMUSCULAR; INTRAVENOUS at 10:48

## 2019-06-11 RX ADMIN — Medication 5 MG: at 11:56

## 2019-06-11 RX ADMIN — PHENYLEPHRINE HYDROCHLORIDE 20 MCG/MIN: 10 INJECTION INTRAVENOUS at 11:13

## 2019-06-11 RX ADMIN — ROCURONIUM BROMIDE 10 MG: 10 INJECTION INTRAVENOUS at 11:41

## 2019-06-11 RX ADMIN — SODIUM CHLORIDE, POTASSIUM CHLORIDE, SODIUM LACTATE AND CALCIUM CHLORIDE: 600; 310; 30; 20 INJECTION, SOLUTION INTRAVENOUS at 10:13

## 2019-06-11 RX ADMIN — FENTANYL CITRATE 25 MCG: 50 INJECTION, SOLUTION INTRAMUSCULAR; INTRAVENOUS at 13:35

## 2019-06-11 RX ADMIN — ROCURONIUM BROMIDE 20 MG: 10 INJECTION INTRAVENOUS at 10:45

## 2019-06-11 RX ADMIN — DEXAMETHASONE SODIUM PHOSPHATE 10 MG: 10 INJECTION INTRAMUSCULAR; INTRAVENOUS at 12:18

## 2019-06-11 RX ADMIN — Medication 5 MG: at 09:30

## 2019-06-11 RX ADMIN — VANCOMYCIN HYDROCHLORIDE 1500 MG: 5 INJECTION, POWDER, LYOPHILIZED, FOR SOLUTION INTRAVENOUS at 20:35

## 2019-06-11 RX ADMIN — Medication 10 MG: at 10:47

## 2019-06-11 RX ADMIN — SODIUM CHLORIDE, POTASSIUM CHLORIDE, SODIUM LACTATE AND CALCIUM CHLORIDE 1000 ML: 600; 310; 30; 20 INJECTION, SOLUTION INTRAVENOUS at 09:07

## 2019-06-11 RX ADMIN — DEXTROSE AND SODIUM CHLORIDE: 5; .45 INJECTION, SOLUTION INTRAVENOUS at 14:18

## 2019-06-11 RX ADMIN — HEPARIN SODIUM 7500 UNITS: 1000 INJECTION INTRAVENOUS; SUBCUTANEOUS at 11:49

## 2019-06-11 RX ADMIN — MORPHINE SULFATE 4 MG: 4 INJECTION INTRAVENOUS at 07:58

## 2019-06-11 RX ADMIN — ATORVASTATIN CALCIUM 10 MG: 10 TABLET, FILM COATED ORAL at 20:34

## 2019-06-11 RX ADMIN — ROCURONIUM BROMIDE 20 MG: 10 INJECTION INTRAVENOUS at 11:29

## 2019-06-11 RX ADMIN — ROCURONIUM BROMIDE 10 MG: 10 INJECTION INTRAVENOUS at 12:33

## 2019-06-11 RX ADMIN — PHENYTOIN SODIUM 200 MG: 100 CAPSULE ORAL at 20:35

## 2019-06-11 RX ADMIN — LIDOCAINE HYDROCHLORIDE 60 MG: 20 INJECTION, SOLUTION INTRAVENOUS at 09:25

## 2019-06-11 RX ADMIN — DULOXETINE HYDROCHLORIDE 30 MG: 30 CAPSULE, DELAYED RELEASE ORAL at 15:53

## 2019-06-11 RX ADMIN — SODIUM CHLORIDE, POTASSIUM CHLORIDE, SODIUM LACTATE AND CALCIUM CHLORIDE 1000 ML: 600; 310; 30; 20 INJECTION, SOLUTION INTRAVENOUS at 09:10

## 2019-06-11 RX ADMIN — ROCURONIUM BROMIDE 20 MG: 10 INJECTION INTRAVENOUS at 13:07

## 2019-06-11 RX ADMIN — PHENYLEPHRINE HYDROCHLORIDE 50 MCG: 10 INJECTION INTRAVENOUS at 13:15

## 2019-06-11 RX ADMIN — HYDROMORPHONE HYDROCHLORIDE 0.5 MG: 1 INJECTION, SOLUTION INTRAMUSCULAR; INTRAVENOUS; SUBCUTANEOUS at 21:38

## 2019-06-11 RX ADMIN — VITAMIN D, TAB 1000IU (100/BT) 1000 UNITS: 25 TAB at 15:53

## 2019-06-11 RX ADMIN — VANCOMYCIN HYDROCHLORIDE 1.5 G: 5 INJECTION, POWDER, LYOPHILIZED, FOR SOLUTION INTRAVENOUS at 09:49

## 2019-06-11 RX ADMIN — TRAZODONE HYDROCHLORIDE 50 MG: 50 TABLET ORAL at 20:35

## 2019-06-11 RX ADMIN — Medication 10 ML: at 20:35

## 2019-06-11 RX ADMIN — DIVALPROEX SODIUM 125 MG: 125 CAPSULE, COATED PELLETS ORAL at 20:35

## 2019-06-11 ASSESSMENT — PULMONARY FUNCTION TESTS
PIF_VALUE: 17
PIF_VALUE: 24
PIF_VALUE: 25
PIF_VALUE: 4
PIF_VALUE: 24
PIF_VALUE: 24
PIF_VALUE: 23
PIF_VALUE: 25
PIF_VALUE: 24
PIF_VALUE: 20
PIF_VALUE: 24
PIF_VALUE: 21
PIF_VALUE: 0
PIF_VALUE: 22
PIF_VALUE: 23
PIF_VALUE: 23
PIF_VALUE: 24
PIF_VALUE: 20
PIF_VALUE: 23
PIF_VALUE: 4
PIF_VALUE: 24
PIF_VALUE: 25
PIF_VALUE: 17
PIF_VALUE: 24
PIF_VALUE: 23
PIF_VALUE: 24
PIF_VALUE: 20
PIF_VALUE: 24
PIF_VALUE: 0
PIF_VALUE: 24
PIF_VALUE: 25
PIF_VALUE: 23
PIF_VALUE: 21
PIF_VALUE: 20
PIF_VALUE: 15
PIF_VALUE: 24
PIF_VALUE: 1
PIF_VALUE: 25
PIF_VALUE: 25
PIF_VALUE: 23
PIF_VALUE: 24
PIF_VALUE: 21
PIF_VALUE: 25
PIF_VALUE: 24
PIF_VALUE: 24
PIF_VALUE: 20
PIF_VALUE: 25
PIF_VALUE: 21
PIF_VALUE: 23
PIF_VALUE: 22
PIF_VALUE: 23
PIF_VALUE: 22
PIF_VALUE: 24
PIF_VALUE: 18
PIF_VALUE: 1
PIF_VALUE: 21
PIF_VALUE: 0
PIF_VALUE: 23
PIF_VALUE: 19
PIF_VALUE: 21
PIF_VALUE: 24
PIF_VALUE: 24
PIF_VALUE: 26
PIF_VALUE: 20
PIF_VALUE: 25
PIF_VALUE: 17
PIF_VALUE: 21
PIF_VALUE: 23
PIF_VALUE: 24
PIF_VALUE: 23
PIF_VALUE: 21
PIF_VALUE: 22
PIF_VALUE: 25
PIF_VALUE: 17
PIF_VALUE: 29
PIF_VALUE: 25
PIF_VALUE: 23
PIF_VALUE: 25
PIF_VALUE: 24
PIF_VALUE: 24
PIF_VALUE: 17
PIF_VALUE: 25
PIF_VALUE: 25
PIF_VALUE: 20
PIF_VALUE: 24
PIF_VALUE: 25
PIF_VALUE: 0
PIF_VALUE: 17
PIF_VALUE: 25
PIF_VALUE: 21
PIF_VALUE: 25
PIF_VALUE: 25
PIF_VALUE: 21
PIF_VALUE: 24
PIF_VALUE: 25
PIF_VALUE: 21
PIF_VALUE: 21
PIF_VALUE: 25
PIF_VALUE: 21
PIF_VALUE: 17
PIF_VALUE: 13
PIF_VALUE: 17
PIF_VALUE: 23
PIF_VALUE: 22
PIF_VALUE: 24
PIF_VALUE: 25
PIF_VALUE: 25
PIF_VALUE: 23
PIF_VALUE: 24
PIF_VALUE: 23
PIF_VALUE: 25
PIF_VALUE: 24
PIF_VALUE: 33
PIF_VALUE: 24
PIF_VALUE: 25
PIF_VALUE: 25
PIF_VALUE: 24
PIF_VALUE: 25
PIF_VALUE: 17
PIF_VALUE: 24
PIF_VALUE: 24
PIF_VALUE: 20
PIF_VALUE: 17
PIF_VALUE: 17
PIF_VALUE: 23
PIF_VALUE: 0
PIF_VALUE: 20
PIF_VALUE: 24
PIF_VALUE: 24
PIF_VALUE: 20
PIF_VALUE: 24
PIF_VALUE: 25
PIF_VALUE: 0
PIF_VALUE: 17
PIF_VALUE: 20
PIF_VALUE: 13
PIF_VALUE: 25
PIF_VALUE: 23
PIF_VALUE: 25
PIF_VALUE: 23
PIF_VALUE: 24
PIF_VALUE: 25
PIF_VALUE: 19
PIF_VALUE: 25
PIF_VALUE: 23
PIF_VALUE: 25
PIF_VALUE: 21
PIF_VALUE: 25
PIF_VALUE: 21
PIF_VALUE: 22
PIF_VALUE: 24
PIF_VALUE: 21
PIF_VALUE: 23
PIF_VALUE: 26
PIF_VALUE: 24
PIF_VALUE: 3
PIF_VALUE: 17
PIF_VALUE: 17
PIF_VALUE: 20
PIF_VALUE: 23
PIF_VALUE: 22
PIF_VALUE: 24
PIF_VALUE: 20
PIF_VALUE: 24
PIF_VALUE: 23
PIF_VALUE: 25
PIF_VALUE: 24
PIF_VALUE: 24
PIF_VALUE: 25
PIF_VALUE: 20
PIF_VALUE: 24
PIF_VALUE: 24
PIF_VALUE: 25
PIF_VALUE: 21
PIF_VALUE: 25
PIF_VALUE: 24
PIF_VALUE: 25
PIF_VALUE: 20
PIF_VALUE: 20
PIF_VALUE: 17
PIF_VALUE: 17
PIF_VALUE: 21
PIF_VALUE: 21
PIF_VALUE: 23
PIF_VALUE: 21
PIF_VALUE: 24
PIF_VALUE: 24
PIF_VALUE: 21
PIF_VALUE: 21
PIF_VALUE: 0
PIF_VALUE: 17
PIF_VALUE: 25
PIF_VALUE: 25
PIF_VALUE: 22
PIF_VALUE: 17
PIF_VALUE: 0
PIF_VALUE: 25
PIF_VALUE: 21
PIF_VALUE: 21
PIF_VALUE: 17
PIF_VALUE: 24
PIF_VALUE: 25
PIF_VALUE: 25
PIF_VALUE: 17
PIF_VALUE: 23
PIF_VALUE: 24
PIF_VALUE: 17
PIF_VALUE: 24
PIF_VALUE: 25
PIF_VALUE: 21
PIF_VALUE: 25
PIF_VALUE: 20
PIF_VALUE: 18
PIF_VALUE: 17
PIF_VALUE: 24
PIF_VALUE: 20
PIF_VALUE: 25
PIF_VALUE: 24
PIF_VALUE: 21
PIF_VALUE: 24
PIF_VALUE: 21
PIF_VALUE: 24
PIF_VALUE: 0
PIF_VALUE: 23
PIF_VALUE: 25
PIF_VALUE: 24
PIF_VALUE: 24
PIF_VALUE: 0
PIF_VALUE: 13
PIF_VALUE: 16
PIF_VALUE: 23
PIF_VALUE: 25
PIF_VALUE: 25
PIF_VALUE: 17
PIF_VALUE: 25
PIF_VALUE: 25
PIF_VALUE: 21
PIF_VALUE: 16
PIF_VALUE: 24
PIF_VALUE: 25
PIF_VALUE: 25
PIF_VALUE: 24
PIF_VALUE: 20
PIF_VALUE: 0
PIF_VALUE: 23
PIF_VALUE: 15

## 2019-06-11 ASSESSMENT — PAIN DESCRIPTION - ORIENTATION: ORIENTATION: RIGHT

## 2019-06-11 ASSESSMENT — PAIN SCALES - GENERAL
PAINLEVEL_OUTOF10: 10
PAINLEVEL_OUTOF10: 9
PAINLEVEL_OUTOF10: 10
PAINLEVEL_OUTOF10: 0
PAINLEVEL_OUTOF10: 0
PAINLEVEL_OUTOF10: 10
PAINLEVEL_OUTOF10: 9
PAINLEVEL_OUTOF10: 0
PAINLEVEL_OUTOF10: 0
PAINLEVEL_OUTOF10: 10

## 2019-06-11 ASSESSMENT — PAIN DESCRIPTION - LOCATION: LOCATION: OTHER (COMMENT)

## 2019-06-11 NOTE — PROGRESS NOTES
Change in Status Communication Form      To the referring physician:    Due to an acute change in this patient's medical status, new Occupational Therapy Eval and Treatment orders are required. We thank you for your referral.   Dominick Bingham,   [facility] EMERGENCY [facility] AT [place] OT Department.      Electronically signed by RAJWINDER Locke/L on 6/11/19 at 11:46 AM

## 2019-06-11 NOTE — PROGRESS NOTES
1350  Admitted to ICU via bed from OR.   1402  OR changed to 4 liter nc  1421  Medicated with Dilaudid 0.5 mg for severe pain  1430  Sleeping with snorous respirations  1550  Podiatry at bedside. Dressing removed from stump. Large amount of blood noted from removal of dressing. Large wound inner side of stump red wound base no drainage noted other than blood and no black areas noted. Moaning pain after dressing change, warm blankets applied. 1600  Patient asked for water. She drank a large amount and took her pills without difficulty. 1640  Sleeping soundly  1800  Repositioned for comort. Eating dinner. 1831  Medicated with Dilaudid for pain.

## 2019-06-11 NOTE — ANESTHESIA PRE PROCEDURE
Historical Provider, MD   traZODone (DESYREL) 50 MG tablet Take 50 mg by mouth nightly    Historical Provider, MD   docusate sodium (COLACE) 100 MG capsule Take 100 mg by mouth every morning    Historical Provider, MD   aspirin 81 MG chewable tablet Take 81 mg by mouth daily    Historical Provider, MD   atorvastatin (LIPITOR) 10 MG tablet Take 10 mg by mouth nightly     Historical Provider, MD   vitamin D 1000 UNITS CAPS Take 1 capsule by mouth daily     Historical Provider, MD   isosorbide mononitrate (IMDUR) 30 MG extended release tablet Take 30 mg by mouth daily    Historical Provider, MD   furosemide (LASIX) 20 MG tablet Take 40 mg by mouth daily  6/9/19   Historical Provider, MD   magnesium hydroxide (MILK OF MAGNESIA) 400 MG/5ML suspension Take 30 mLs by mouth daily as needed for Constipation    Historical Provider, MD   phenytoin (DILANTIN) 100 MG ER capsule Take 100 mg by mouth Daily with lunch     Historical Provider, MD   metoprolol succinate (TOPROL XL) 25 MG extended release tablet Take 25 mg by mouth daily    Historical Provider, MD   zonisamide (ZONEGRAN) 100 MG capsule Take 100 mg by mouth daily    Historical Provider, MD       Current medications:    Current Facility-Administered Medications   Medication Dose Route Frequency Provider Last Rate Last Dose    vancomycin (VANCOCIN) 1,500 mg in dextrose 5 % 500 mL IVPB  1,500 mg Intravenous Q12H Raya Adams  mL/hr at 06/11/19 0814 1,500 mg at 06/11/19 2428    vancomycin (VANCOCIN) intermittent dosing (placeholder)   Other RX Placeholder Raya Adams [place]        sodium chloride flush 0.9 % injection 10 mL  10 mL Intravenous 2 times per day Raya Bryan PA   10 mL at 06/11/19 0813    sodium chloride flush 0.9 % injection 10 mL  10 mL Intravenous PRN EVELYN Casanova        magnesium hydroxide (MILK OF MAGNESIA) 400 MG/5ML suspension 30 mL  30 mL Oral Daily PRN EVELYN Casanova        ondansetron [facility]) injection 4 mg  4 mg Intravenous Q6H PRN [facility] EVELYN Morris        enoxaparin (LOVENOX) injection 40 mg  40 mg Subcutaneous Daily [place], [place]   Stopped at 06/10/19 2030    0.9 % sodium chloride infusion   Intravenous Continuous [place], [place]   Stopped at 06/10/19 2100    piperacillin-tazobactam (ZOSYN) 3.375 g in dextrose 5 % 50 mL IVPB extended infusion (mini-bag)  3.375 g Intravenous Q8H [place], [place]   Stopped at 06/10/19 2340    insulin lispro (HUMALOG) injection vial 0-12 Units  0-12 Units Subcutaneous TID [place], [place]        insulin lispro (HUMALOG) injection vial 0-6 Units  0-6 Units Subcutaneous Nightly [place], [place]   1 Units at 06/08/19 2314    acetaminophen (TYLENOL) tablet 650 mg  650 mg Oral Q4H PRN Ova Balint, DO        oxyCODONE-acetaminophen (PERCOCET) 5-325 MG per tablet 1 tablet  1 tablet Oral Q6H PRN Ova Balint, DO   1 tablet at 06/10/19 2029    morphine injection 4 mg  4 mg Intravenous Q4H PRN Ova Balint, DO   4 mg at 06/11/19 0758    aspirin chewable tablet 81 mg  81 mg Oral Daily Ova Balint, DO   81 mg at 06/10/19 1141    atorvastatin (LIPITOR) tablet 10 mg  10 mg Oral Nightly Ova Balint, DO   10 mg at 06/10/19 2028    divalproex (DEPAKOTE SPRINKLE) capsule 125 mg  125 mg Oral TID Ova Balint, DO   125 mg at 06/10/19 2028    docusate sodium (COLACE) capsule 100 mg  100 mg Oral QAM Yazid R Manohar, DO   100 mg at 06/10/19 1141    DULoxetine (CYMBALTA) extended release capsule 30 mg  30 mg Oral Daily Ova Balint, DO   30 mg at 06/10/19 1139    furosemide (LASIX) tablet 40 mg  40 mg Oral Daily Ova Balint, DO   40 mg at 06/10/19 1139    ipratropium-albuterol (DUONEB) nebulizer solution 3 mL  1 vial Inhalation Q8H PRN Ova Balint, DO        LORazepam (ATIVAN) tablet 0.25 mg  0.25 mg Oral Q8H PRN Karley Villela DO   0.25 mg at 06/07/19 2020    magnesium GLUCOSE 99 06/11/2019    GLUCOSE 94 12/28/2011    PROT 7.0 06/07/2019    CALCIUM 8.6 06/11/2019    BILITOT <0.2 06/07/2019    ALKPHOS 110 06/07/2019    AST 26 06/07/2019    ALT 14 06/07/2019       POC Tests:   Recent Labs     06/11/19  0802   POCGLU 126*       Coags:   Lab Results   Component Value Date    PROTIME 10.6 05/12/2017    PROTIME 16.5 12/30/2013    INR 1.0 05/12/2017    APTT 24.3 05/12/2017       HCG (If Applicable):   Lab Results   Component Value Date    PREGTESTUR Negative 01/14/2019        ABGs: No results found for: PHART, PO2ART, YBM8FRJ, NPP7PVC, BEART, X7PUFKOO     Type & Screen (If Applicable):  No results found for: LABABO, [address]    Anesthesia Evaluation  Patient summary reviewed and Nursing notes reviewed no history of anesthetic complications:   Airway: Mallampati: II  TM distance: >3 FB   Neck ROM: full  Mouth opening: > = 3 FB Dental:    (+) edentulous and upper dentures      Pulmonary:Negative Pulmonary ROS                              Cardiovascular:    (+) hypertension:, CAD:, hyperlipidemia      ECG reviewed               Beta Blocker:  Dose within 24 Hrs         Neuro/Psych:   (+) seizures:, psychiatric history:             ROS comment: Dementia   GI/Hepatic/Renal:            ROS comment: Class II obesity  . Endo/Other:    (+) Diabetes, . Abdominal:           Vascular:   + PVD, aortic or cerebral, . Anesthesia Plan      general     ASA 4       Induction: intravenous. arterial line  MIPS: Postoperative opioids intended and Prophylactic antiemetics administered. Anesthetic plan and risks discussed with patient. Plan discussed with CRNA.     Attending anesthesiologist reviewed and agrees with Kendy Dao MD   6/11/2019

## 2019-06-11 NOTE — ANESTHESIA POSTPROCEDURE EVALUATION
Department of Anesthesiology  Postprocedure Note    Patient: Vanessa Troy  MRN: [id number]  YOB: 1960  Date of evaluation: 6/11/2019  Time:  2:00 PM     Procedure Summary     Date:  06/11/19 [place] [address] [place] [facility]    Anesthesia Start:  6515 Anesthesia Stop:  1400    Procedures:       RIGHT ARM AORTO FEMORAL DIGITAL SUBTRACTION ARTERIOGRAM (Right Arm Upper)      REVISION AND REVASCULARIZATION RIGHT LOWER EXTREMITY (Right Leg Upper) Diagnosis:  (INPATIENT)    Surgeon:  Oj Branodn MD Responsible Provider:  Dennie Lowing, MD    Anesthesia Type:  general ASA Status:  4          Anesthesia Type: general    Faustina Phase I:      Faustina Phase II:      Last vitals: Reviewed and per EMR flowsheets.        Anesthesia Post Evaluation    Patient location during evaluation: PACU  Patient participation: complete - patient participated  Level of consciousness: awake  Pain score: 0  Airway patency: patent  Nausea & Vomiting: no nausea and no vomiting  Complications: no  Cardiovascular status: hemodynamically stable  Respiratory status: acceptable  Hydration status: euvolemic

## 2019-06-11 NOTE — PROGRESS NOTES
suspension 30 mL  30 mL Oral Daily PRN Leelee Bajwa DO        vancomycin [facility]) intermittent dosing (placeholder)   Other RX Placeholder Leelee Bajwa DO        vancomycin (VANCOCIN) 1,500 mg in dextrose 5 % 500 mL IVPB  1,500 mg Intravenous Q12H Leelee Bajwa DO         Assessment and Plan:           Active problems:    # right stump/ BKA infection w/ necrosis  -  vancomycin and zosyn   - ID and vascular surgery consults (BKA done 3 years ago by Dr Clarence Arellano per pt)  - follow up blood culture  - pain control prn   - s/p revasc 6/11 by Dr Rere Samson     # diabetes   - resume insulin   - monitor     # HTN  - resume home meds    # seizure d/o   - on home AED's, monitor     DVT/PPx        DISCHARGE PLANNING  Pending vascular        Electronically signed by Leelee Bajwa DO on 6/11/2019 at 3:44 PM

## 2019-06-11 NOTE — PROGRESS NOTES
Multiple attempts to start new IV, by supervisor and ICU nurse, which were unsuccessful. RN aware, will pass on in report to on coming shift.

## 2019-06-11 NOTE — PROGRESS NOTES
Assessment completed this shift. Alert and oriented x4. Lungs diminished. BS present. Redness and black wound noted  Right BKA stump. Morphine  4 mg given for pain 10/10 right stump. Transport to surgery via bed.   Electronically signed by Renee Fabry, RN on 6/11/2019 at 8:47 AM

## 2019-06-11 NOTE — ANESTHESIA PROCEDURE NOTES
Arterial Line:    An arterial line was placed using surface landmarks, in the holding area for the following indication(s): continuous blood pressure monitoring. A 20 gauge (size), 1 and 3/4 inch (length), Arrow (type) catheter was placed, Seldinger technique used, into the left radial artery, secured by tape and Tegaderm. Anesthesia type: Local  Local infiltration: Injection    Events:  patient tolerated procedure well with no complications.   6/11/2019 9:05 AM6/11/2019 9:12 AM  Anesthesiologist: Marcus Perry MD  Performed: Anesthesiologist   Preanesthetic Checklist  Completed: patient identified, site marked, surgical consent, pre-op evaluation, timeout performed, IV checked, risks and benefits discussed, monitors and equipment checked, anesthesia consent given, oxygen available and patient being monitored

## 2019-06-11 NOTE — BRIEF OP NOTE
Brief Postoperative Note  ______________________________________________________________    Patient: Eileen Bills  YOB: 1960  MRN: [id number]  Date of Procedure: 6/11/2019    Pre-Op Diagnosis: CLTI  LE    Post-Op Diagnosis: Same       Procedure(s):  RIGHT ARM AORTO FEMORAL DIGITAL SUBTRACTION ARTERIOGRAM  REVISION AND REVASCULARIZATION RIGHT LOWER EXTREMITY    Anesthesia: General    Surgeon(s):  Johnny Stanton MD    Assistant: Priscella Ahumada and Zakia [facility]    Estimated Blood Loss (mL): 969 cc    Complications: none    Specimens:   ID Type Source Tests Collected by Time Destination   A : Femoral Artery Tissue Groin SURGICAL PATHOLOGY Johnny Stanton MD 6/11/2019 1142    B : RIGHT LEG STUMP NECROSIS Tissue Leg SURGICAL PATHOLOGY Johnny Stanton MD 6/11/2019 1311        Implants:  Implant Name Type Inv.  Item Serial No.  Lot No. LRB No. Used   GRAFT VASC STD WALL STRTCH [id number] - [id number] Vascular/Graft/Patch/Filter GRAFT VASC STD WALL STRTCH [id number]  [employer]  Right 1         Drains:   Urethral Catheter Temperature probe 16 fr (Active)       Findings: dictated    Johnny Stanton MD  Date: 6/11/2019  Time: 1:58 PM

## 2019-06-11 NOTE — CARE COORDINATION
Pt is from [place], bed hold. .Electronically signed by SHRUTHI Dove on 6/11/2019 at 3:25 PM

## 2019-06-12 LAB
ANION GAP SERPL CALCULATED.3IONS-SCNC: 14 MEQ/L (ref 9–15)
BASOPHILS ABSOLUTE: 0 K/UL (ref 0–0.2)
BASOPHILS RELATIVE PERCENT: 0.4 %
BLOOD CULTURE, ROUTINE: NORMAL
BUN BLDV-MCNC: 4 MG/DL (ref 6–20)
CALCIUM SERPL-MCNC: 8 MG/DL (ref 8.5–9.9)
CHLORIDE BLD-SCNC: 111 MEQ/L (ref 95–107)
CO2: 18 MEQ/L (ref 20–31)
CREAT SERPL-MCNC: 0.52 MG/DL (ref 0.5–0.9)
CULTURE, BLOOD 2: NORMAL
EOSINOPHILS ABSOLUTE: 0.1 K/UL (ref 0–0.7)
EOSINOPHILS RELATIVE PERCENT: 0.9 %
GFR AFRICAN AMERICAN: >60
GFR NON-AFRICAN AMERICAN: >60
GLUCOSE BLD-MCNC: 100 MG/DL (ref 70–99)
GLUCOSE BLD-MCNC: 108 MG/DL (ref 60–115)
GLUCOSE BLD-MCNC: 116 MG/DL (ref 60–115)
GLUCOSE BLD-MCNC: 140 MG/DL (ref 60–115)
HCT VFR BLD CALC: 32.4 % (ref 37–47)
HEMOGLOBIN: 11 G/DL (ref 12–16)
LYMPHOCYTES ABSOLUTE: 1.3 K/UL (ref 1–4.8)
LYMPHOCYTES RELATIVE PERCENT: 16.9 %
MCH RBC QN AUTO: 34.5 PG (ref 27–31.3)
MCHC RBC AUTO-ENTMCNC: 34 % (ref 33–37)
MCV RBC AUTO: 101.4 FL (ref 82–100)
MONOCYTES ABSOLUTE: 1.1 K/UL (ref 0.2–0.8)
MONOCYTES RELATIVE PERCENT: 14.2 %
NEUTROPHILS ABSOLUTE: 5 K/UL (ref 1.4–6.5)
NEUTROPHILS RELATIVE PERCENT: 67.6 %
PDW BLD-RTO: 12.9 % (ref 11.5–14.5)
PERFORMED ON: ABNORMAL
PERFORMED ON: ABNORMAL
PERFORMED ON: NORMAL
PLATELET # BLD: 168 K/UL (ref 130–400)
POTASSIUM REFLEX MAGNESIUM: 3.7 MEQ/L (ref 3.4–4.9)
RBC # BLD: 3.2 M/UL (ref 4.2–5.4)
REASON FOR REJECTION: NORMAL
REJECTED TEST: NORMAL
SODIUM BLD-SCNC: 143 MEQ/L (ref 135–144)
VANCOMYCIN TROUGH: 18.2 UG/ML (ref 10–20)
WBC # BLD: 7.4 K/UL (ref 4.8–10.8)

## 2019-06-12 PROCEDURE — 2580000003 HC RX 258: Performed by: THORACIC SURGERY (CARDIOTHORACIC VASCULAR SURGERY)

## 2019-06-12 PROCEDURE — 2580000003 HC RX 258

## 2019-06-12 PROCEDURE — 2580000003 HC RX 258: Performed by: INTERNAL MEDICINE

## 2019-06-12 PROCEDURE — 36415 COLL VENOUS BLD VENIPUNCTURE: CPT

## 2019-06-12 PROCEDURE — 6370000000 HC RX 637 (ALT 250 FOR IP): Performed by: THORACIC SURGERY (CARDIOTHORACIC VASCULAR SURGERY)

## 2019-06-12 PROCEDURE — 6360000002 HC RX W HCPCS: Performed by: THORACIC SURGERY (CARDIOTHORACIC VASCULAR SURGERY)

## 2019-06-12 PROCEDURE — 6360000002 HC RX W HCPCS: Performed by: INTERNAL MEDICINE

## 2019-06-12 PROCEDURE — 37799 UNLISTED PX VASCULAR SURGERY: CPT

## 2019-06-12 PROCEDURE — 99232 SBSQ HOSP IP/OBS MODERATE 35: CPT | Performed by: INTERNAL MEDICINE

## 2019-06-12 PROCEDURE — 6370000000 HC RX 637 (ALT 250 FOR IP): Performed by: INTERNAL MEDICINE

## 2019-06-12 PROCEDURE — 80048 BASIC METABOLIC PNL TOTAL CA: CPT

## 2019-06-12 PROCEDURE — 2000000000 HC ICU R&B

## 2019-06-12 PROCEDURE — 2700000000 HC OXYGEN THERAPY PER DAY

## 2019-06-12 PROCEDURE — 85025 COMPLETE CBC W/AUTO DIFF WBC: CPT

## 2019-06-12 PROCEDURE — 80202 ASSAY OF VANCOMYCIN: CPT

## 2019-06-12 RX ORDER — OXYCODONE AND ACETAMINOPHEN 10; 325 MG/1; MG/1
1 TABLET ORAL EVERY 6 HOURS PRN
Status: DISCONTINUED | OUTPATIENT
Start: 2019-06-12 | End: 2019-06-14 | Stop reason: HOSPADM

## 2019-06-12 RX ORDER — SODIUM CHLORIDE 450 MG/100ML
INJECTION, SOLUTION INTRAVENOUS
Status: COMPLETED
Start: 2019-06-12 | End: 2019-06-12

## 2019-06-12 RX ADMIN — PIPERACILLIN SODIUM,TAZOBACTAM SODIUM 3.38 G: 3; .375 INJECTION, POWDER, FOR SOLUTION INTRAVENOUS at 14:05

## 2019-06-12 RX ADMIN — SODIUM CHLORIDE 1000 ML: 4.5 INJECTION, SOLUTION INTRAVENOUS at 11:07

## 2019-06-12 RX ADMIN — PHENYTOIN SODIUM 200 MG: 100 CAPSULE ORAL at 21:20

## 2019-06-12 RX ADMIN — LORAZEPAM 0.25 MG: 0.5 TABLET ORAL at 21:37

## 2019-06-12 RX ADMIN — VITAMIN D, TAB 1000IU (100/BT) 1000 UNITS: 25 TAB at 08:26

## 2019-06-12 RX ADMIN — DIVALPROEX SODIUM 125 MG: 125 CAPSULE, COATED PELLETS ORAL at 08:26

## 2019-06-12 RX ADMIN — FUROSEMIDE 40 MG: 40 TABLET ORAL at 08:27

## 2019-06-12 RX ADMIN — PIPERACILLIN SODIUM,TAZOBACTAM SODIUM 3.38 G: 3; .375 INJECTION, POWDER, FOR SOLUTION INTRAVENOUS at 08:27

## 2019-06-12 RX ADMIN — PHENYTOIN SODIUM 100 MG: 100 CAPSULE ORAL at 11:07

## 2019-06-12 RX ADMIN — METOPROLOL SUCCINATE 25 MG: 25 TABLET, EXTENDED RELEASE ORAL at 08:26

## 2019-06-12 RX ADMIN — Medication 10 ML: at 21:21

## 2019-06-12 RX ADMIN — ATORVASTATIN CALCIUM 10 MG: 10 TABLET, FILM COATED ORAL at 21:20

## 2019-06-12 RX ADMIN — ZONISAMIDE 100 MG: 100 CAPSULE ORAL at 11:15

## 2019-06-12 RX ADMIN — MORPHINE SULFATE 4 MG: 4 INJECTION INTRAVENOUS at 21:32

## 2019-06-12 RX ADMIN — Medication 10 ML: at 11:16

## 2019-06-12 RX ADMIN — DOCUSATE SODIUM 100 MG: 100 CAPSULE, LIQUID FILLED ORAL at 08:26

## 2019-06-12 RX ADMIN — DIVALPROEX SODIUM 125 MG: 125 CAPSULE, COATED PELLETS ORAL at 14:05

## 2019-06-12 RX ADMIN — DULOXETINE HYDROCHLORIDE 30 MG: 30 CAPSULE, DELAYED RELEASE ORAL at 08:26

## 2019-06-12 RX ADMIN — PHENYTOIN SODIUM 200 MG: 100 CAPSULE ORAL at 08:26

## 2019-06-12 RX ADMIN — TRAZODONE HYDROCHLORIDE 50 MG: 50 TABLET ORAL at 21:20

## 2019-06-12 RX ADMIN — MORPHINE SULFATE 4 MG: 4 INJECTION INTRAVENOUS at 12:10

## 2019-06-12 RX ADMIN — VANCOMYCIN 1250 MG: 1 INJECTION, SOLUTION INTRAVENOUS at 23:38

## 2019-06-12 RX ADMIN — HYDROMORPHONE HYDROCHLORIDE 0.5 MG: 1 INJECTION, SOLUTION INTRAMUSCULAR; INTRAVENOUS; SUBCUTANEOUS at 07:41

## 2019-06-12 RX ADMIN — OXYCODONE HYDROCHLORIDE AND ACETAMINOPHEN 1 TABLET: 10; 325 TABLET ORAL at 18:25

## 2019-06-12 RX ADMIN — SODIUM CHLORIDE: 9 INJECTION, SOLUTION INTRAVENOUS at 01:49

## 2019-06-12 RX ADMIN — HYDROMORPHONE HYDROCHLORIDE 0.5 MG: 1 INJECTION, SOLUTION INTRAMUSCULAR; INTRAVENOUS; SUBCUTANEOUS at 03:47

## 2019-06-12 RX ADMIN — DIVALPROEX SODIUM 125 MG: 125 CAPSULE, COATED PELLETS ORAL at 21:20

## 2019-06-12 RX ADMIN — VANCOMYCIN HYDROCHLORIDE 1500 MG: 5 INJECTION, POWDER, LYOPHILIZED, FOR SOLUTION INTRAVENOUS at 11:07

## 2019-06-12 ASSESSMENT — PAIN DESCRIPTION - LOCATION
LOCATION: LEG
LOCATION: LEG

## 2019-06-12 ASSESSMENT — PAIN DESCRIPTION - ORIENTATION
ORIENTATION: RIGHT
ORIENTATION: RIGHT

## 2019-06-12 ASSESSMENT — PAIN DESCRIPTION - PAIN TYPE
TYPE: SURGICAL PAIN
TYPE: SURGICAL PAIN

## 2019-06-12 ASSESSMENT — PAIN SCALES - GENERAL
PAINLEVEL_OUTOF10: 10
PAINLEVEL_OUTOF10: 0
PAINLEVEL_OUTOF10: 10
PAINLEVEL_OUTOF10: 0
PAINLEVEL_OUTOF10: 3
PAINLEVEL_OUTOF10: 10
PAINLEVEL_OUTOF10: 0
PAINLEVEL_OUTOF10: 10

## 2019-06-12 NOTE — PROGRESS NOTES
Physician Progress Note    2019   9:45 AM    Name:  Anabella Hilario  MRN:    [id number]     IP Day: 5     Admit Date: 2019 10:56 AM  PCP: Juan Carlos De La Cruz MD    Code Status:  Full Code    Subjective:     Doing well post op. Physical Examination:      Vitals:  BP (!) 144/53   Pulse 98   Temp 98.8 °F (37.1 °C) (Bladder) Comment (Src): alexis  Resp 23   Ht 5' 2\" (1.575 m)   Wt 200 lb (90.7 kg)   SpO2 (!) 83%   BMI 36.58 kg/m²   Temp (24hrs), Av.5 °F (35.8 °C), Min:95.9 °F (35.5 °C), Max:98.8 °F (37.1 °C)      General appearance: alert, cooperative and no distress  Mental Status: oriented to person, place and time and normal affect  Lungs: clear to auscultation bilaterally, normal effort  Heart: regular rate and rhythm, no murmur  Abdomen: soft, nontender, nondistended, bowel sounds present, no masses  Extremities: no edema, redness, tenderness in the calves  Skin: right stump with erythema and necrotic 5x5 cm wounds        Data:     Labs:  Recent Labs     19  0713 19  0806   WBC 6.3 7.4   HGB 14.1 11.0*    168     Recent Labs     19  0713 19  0605    143   K 4.0 3.7    111*   CO2 21 18*   BUN 6 4*   CREATININE 0.38* 0.52   GLUCOSE 99 100*     No results for input(s): AST, ALT, ALB, BILITOT, ALKPHOS in the last 72 hours.     Current Facility-Administered Medications   Medication Dose Route Frequency Provider Last Rate Last Dose    oxyCODONE-acetaminophen (PERCOCET)  MG per tablet 1 tablet  1 tablet Oral Q6H PRN Ivette Quintero MD        sodium chloride flush 0.9 % injection 10 mL  10 mL Intravenous 2 times per day Ivette Quintero MD   10 mL at 19    sodium chloride flush 0.9 % injection 10 mL  10 mL Intravenous PRN Ivette Quintero MD        acetaminophen (TYLENOL) tablet 650 mg  650 mg Oral Q4H PRN Ivette Quintero MD        zonisamide [facility]) capsule 100 mg  100 mg Oral Daily Gali Barnes DO   100 mg at 19 1553    traZODone (DESYREL) tablet 50 mg  50 mg Oral Nightly Pitney Aydee, DO   50 mg at 06/11/19 2035    vitamin D (CHOLECALCIFEROL) tablet 1,000 Units  1,000 Units Oral Daily Pitney Aydee, DO   1,000 Units at 06/12/19 0826    phenytoin (DILANTIN) ER capsule 200 mg  200 mg Oral BID Pitney Aydee, DO   200 mg at 06/12/19 9971    phenytoin (DILANTIN) ER capsule 100 mg  100 mg Oral Lunch Pitney Bowes, DO   100 mg at 06/11/19 1553    metoprolol succinate (TOPROL XL) extended release tablet 25 mg  25 mg Oral Daily Pitney Aydee, DO   25 mg at 06/12/19 0826    magnesium hydroxide (MILK OF MAGNESIA) 400 MG/5ML suspension 30 mL  30 mL Oral Daily PRN PitClifton Bowes, DO        LORazepam (ATIVAN) tablet 0.25 mg  0.25 mg Oral Q8H PRN Reid  Manohar, DO        ipratropium-albuterol (DUONEB) nebulizer solution 3 mL  1 vial Inhalation Q8H PRN [facility] Bowes, DO        furosemide (LASIX) tablet 40 mg  40 mg Oral Daily Pitney Aydee, DO   40 mg at 06/12/19 0827    DULoxetine (CYMBALTA) extended release capsule 30 mg  30 mg Oral Daily Pitney Aydee, DO   30 mg at 06/12/19 5963    docusate sodium (COLACE) capsule 100 mg  100 mg Oral QAM [facility] R Manohar, DO   100 mg at 06/12/19 0826    divalproex (DEPAKOTE SPRINKLE) capsule 125 mg  125 mg Oral TID Pitney Bowes, DO   125 mg at 06/12/19 0826    atorvastatin (LIPITOR) tablet 10 mg  10 mg Oral Nightly Pitney Aydee, DO   10 mg at 06/11/19 2034    morphine injection 4 mg  4 mg Intravenous Q4H PRN Pitney Aydee, DO        insulin lispro (HUMALOG) injection vial 0-6 Units  0-6 Units Subcutaneous Nightly Yazid R Manohar, DO        insulin lispro (HUMALOG) injection vial 0-12 Units  0-12 Units Subcutaneous TID WC Pitney Aydee, DO   2 Units at 06/11/19 1815    piperacillin-tazobactam (ZOSYN) 3.375 g in dextrose 5 % 50 mL IVPB extended infusion (mini-bag)  3.375 g Intravenous Q8H Lety Villela DO 12.5 mL/hr at 06/12/19 0827 3.375 g at 06/12/19 0827    ondansetron [facility]) injection 4 mg  4 mg Intravenous Q6H PRN Atul Gutting, DO        magnesium hydroxide (MILK OF MAGNESIA) 400 MG/5ML suspension 30 mL  30 mL Oral Daily PRN Atul Gutting, DO        vancomycin [facility]) intermittent dosing (placeholder)   Other RX Placeholder Atul Cortez, DO        vancomycin (VANCOCIN) 1,500 mg in dextrose 5 % 500 mL IVPB  1,500 mg Intravenous Q12H Atul Cortez, DO   Stopped at 06/11/19 1320     Assessment and Plan:           Active problems:    # right stump/ BKA infection w/ necrosis  - vancomycin and zosyn   - ID and vascular surgery consults (BKA done 3 years ago by Dr Sharri Garcia per pt)  - follow up blood culture  - pain control prn   - s/p revasc 6/11 by Dr Joo Monroy     # diabetes   - resume insulin   - monitor     # HTN  - resume home meds    # seizure d/o   - on home AED's, monitor     DVT/PPx        [facility]       Electronically signed by Atul Cortez DO on 6/12/2019 at 9:45 AM

## 2019-06-12 NOTE — PROGRESS NOTES
0900: pt  D/c a line, was not functioning properly. Pressure held x5 mins, no bleeding, ozzing or other abnormalities noted. Pressure dressing applied. Called pharmacy for vanco trough draw. Pt resting comfortably, turns self.

## 2019-06-12 NOTE — CARE COORDINATION
Patient to have wound vac placed today. Message sent to [place] with [employer].   Electronically signed by Eddi Campbell RN on 6/12/19 at 1:26 PM    Spoke with Naren Valdes and she is aware of wound vac  Electronically signed by Eddi Campbell RN on 6/12/19 at 4:06 PM

## 2019-06-12 NOTE — PROGRESS NOTES
Every Day Smoker     Packs/day: 0.50     Years: 13.00     Pack years: 6.50     Types: Cigarettes    Smokeless tobacco: Never Used   Substance and Sexual Activity    Alcohol use: No     Comment: in nursing home for 3 years    Drug use: No    Sexual activity: Not Currently   Lifestyle    Physical activity:     Days per week: Not on file     Minutes per session: Not on file    Stress: Not on file   Relationships    Social connections:     Talks on phone: Not on file     Gets together: Not on file     Attends Hindu service: Not on file     Active member of club or organization: Not on file     Attends meetings of clubs or organizations: Not on file     Relationship status: Not on file    Intimate partner violence:     Fear of current or ex partner: Not on file     Emotionally abused: Not on file     Physically abused: Not on file     Forced sexual activity: Not on file   Other Topics Concern    Not on file   Social History Narrative    Not on file       History reviewed. No pertinent family history. No current facility-administered medications on file prior to encounter. Current Outpatient Medications on File Prior to Encounter   Medication Sig Dispense Refill    divalproex (DEPAKOTE SPRINKLE) 125 MG capsule Take 125 mg by mouth 3 times daily      furosemide (LASIX) 10 MG/ML injection Infuse 40 mg intravenously daily      LORazepam (ATIVAN) 1 MG tablet Take 1 mg by mouth every 4 hours as needed (Seizures).  oxyCODONE (ROXICODONE) 5 MG immediate release tablet Take 5 mg by mouth every 8 hours as needed for Pain.       potassium chloride (MICRO-K) 10 MEQ extended release capsule Take 10 mEq by mouth daily      ipratropium-albuterol (DUONEB) 0.5-2.5 (3) MG/3ML SOLN nebulizer solution Inhale 1 vial into the lungs every 8 hours as needed for Shortness of Breath (COPD)      [START ON 6/13/2019] divalproex (DEPAKOTE SPRINKLE) 125 MG capsule Take 125 mg by mouth 2 times daily      [START ON GLUCOSE 100 06/12/2019    GLUCOSE 94 12/28/2011    CALCIUM 8.0 06/12/2019      Lab Results   Component Value Date    WBC 7.4 06/12/2019    HGB 11.0 (L) 06/12/2019    HCT 32.4 (L) 06/12/2019    .4 (H) 06/12/2019     06/12/2019       Radiology:   I have reviewed imaging results per electronic record and most pertinent abnormalities are being addressed from an infectious disease standpoint. Assessment:  R BKA wound s/p revascularization and debridement  Obese patient  Cellulitis of RLE      Plan:  Vanco and Zosyn   Supportive care  No intraop cultures sent?        Martha Rick D.O.

## 2019-06-12 NOTE — PROGRESS NOTES
Moe Espana  1960  female  Medical Record Number: [id number]    Patient informed that I am an Infectious Disease physician and permission obtained from the patient to speak in front of any visitors prior to any discussion for HIPPA purposes. HPI: Patient with R BKA stump wound ( BKA 3 years ago )   With tissue necrosis and erythema  S/p revascularization and currently in the ICU  Debridement by podiatry.      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     Athscl heart disease of native coronary artery w/o ang pctrs     CAD (coronary artery disease)     Cancer (HCC)     right BKA    Constipation     Constipation     Depression     MAJOR DEPRESSIVE DISORDER    Diabetes mellitus [facility] [address]     TYPE II    Diabetic neuropathy ([facility] [address] [facility]     Hyperlipidemia     Hypertension     Insomnia     Muscle weakness     PVD [facility]peripheral vascular [facility]) [facility]     Seizures ([facility])     Vascular dementia     Vitamin D deficiency     Weakness        Past Surgical History:   Procedure Laterality Date    LEG AMPUTATION BELOW KNEE Right        Social History     Socioeconomic History    Marital status: Single     Spouse name: Not on file    Number of children: Not on file    Years of education: Not on file    Highest education level: Not on file   Occupational History    Not on file   Social Needs    Financial resource strain: Not on file    Food insecurity:     Worry: Not on file     Inability: Not on file    Transportation needs:     Medical: Not on file     Non-medical: Not on file   Tobacco Use    Smoking status: Current Every Day Smoker     Packs/day: 0.50     Years: 13.00     Pack years: 6.50     Types: Cigarettes    Smokeless tobacco: Never Used   Substance and Sexual Take 125 mg by mouth daily      DULoxetine (CYMBALTA) 30 MG extended release capsule Take 1 capsule by mouth daily 30 capsule 3    phenytoin (DILANTIN) 100 MG ER capsule Take 200 mg by mouth 2 times daily MORNING AND AT BEDTIME      traZODone (DESYREL) 50 MG tablet Take 50 mg by mouth nightly      docusate sodium (COLACE) 100 MG capsule Take 100 mg by mouth every morning      aspirin 81 MG chewable tablet Take 81 mg by mouth daily      atorvastatin (LIPITOR) 10 MG tablet Take 10 mg by mouth nightly       vitamin D 1000 UNITS CAPS Take 1 capsule by mouth daily       isosorbide mononitrate (IMDUR) 30 MG extended release tablet Take 30 mg by mouth daily      furosemide (LASIX) 20 MG tablet Take 40 mg by mouth daily       magnesium hydroxide (MILK OF MAGNESIA) 400 MG/5ML suspension Take 30 mLs by mouth daily as needed for Constipation      phenytoin (DILANTIN) 100 MG ER capsule Take 100 mg by mouth Daily with lunch       metoprolol succinate (TOPROL XL) 25 MG extended release tablet Take 25 mg by mouth daily      zonisamide (ZONEGRAN) 100 MG capsule Take 100 mg by mouth daily         Physical Exam:  Sitting in a chair  General: Patient appears in no acute distress, cooperative  Skin: no new rashes   Obese patient. HEENT: EOMI, MMM, Neck is supple  Heart: S1 S2  Lungs: bilaterally clear to auscultation  Abdomen: soft, ND, NTTP, +BS  Extrem:+RLE stump dressing  Neuro exam: CN II-XII intact, facial expressions symmertrical bilaterally, no slurred speech      Labs: I have reviewed all lab results by electronic record, including most recent CBC, metabolic panel, and pertinent abnormalities were addressed from an infectious disease perspective. WBC trends are being monitored.     Lab Results   Component Value Date     06/11/2019    K 4.0 06/11/2019     06/11/2019    CO2 21 06/11/2019    BUN 6 06/11/2019    CREATININE 0.38 06/11/2019    GLUCOSE 99 06/11/2019    GLUCOSE 94 12/28/2011    CALCIUM 8.6 06/11/2019      Lab Results   Component Value Date    WBC 6.3 06/11/2019    HGB 14.1 06/11/2019    HCT 41.2 06/11/2019    .4 (H) 06/11/2019     06/11/2019       Radiology:   I have reviewed imaging results per electronic record and most pertinent abnormalities are being addressed from an infectious disease standpoint. Assessment:  R BKA wound s/p revascularization and debridement  Obese patient  Cellulitis of RLE      Plan:  Vanco and Zosyn for now. Supportive care   intraop cultures pending.        Martha Rick D.O.

## 2019-06-12 NOTE — PROGRESS NOTES
kg/m². Estimated Creatinine Clearance: 122 mL/min (based on SCr of 0.52 mg/dL). Trough: 18.2mcg/mL     Assessment/Plan:  Trough level of 18.2mcg/mL is within goal range of 10-20mcg/mL for wound infection. Due to administration times being altered for surgery/patient care needs, will recommend decreasing back to vancomycin 1250mg IV Q12 hours at this time. Renal function has remained stable. Will obtain trough level prior to the 4th dose at this lower dosage strength. Trough level ordered for 6/14/19 @ 1030.      Bayron Boyer PharmD   6/12/2019 3:26 PM

## 2019-06-12 NOTE — OP NOTE
[address]ryn [address]                      [facility], [address] [facility]                                OPERATIVE REPORT    PATIENT NAME: Soto Crenshaw                   :        1960  MED REC NO:   [id number]                            ROOM:       08  ACCOUNT NO:   [de-identified]                           ADMIT DATE: 2019  PROVIDER:     Vannessa Robb MD    DATE OF PROCEDURE:  2019    PREOPERATIVE DIAGNOSES:  CLTI, chronic limb threatening ischemia, right  lower extremity BKA stump. POSTOPERATIVE DIAGNOSES:  CLTI, chronic limb threatening ischemia, right  lower extremity BKA stump. OPERATION PERFORMED:  1. Right brachial aortofemoral digital subtraction arteriogram with  supervision interpretation. 2.  Right common femoral to profunda femoral artery bypass. 3.  Debridement of a left below-the-knee necrotic stump. SURGEON:  Vannessa Robb MD    ANESTHESIA:  General.    FINDINGS:  A 71-year-old woman seen over the weekend with a large eschar  overlying the medial aspect of her BKA stump. The procedure itself,  i.e., the knee amputation was done apparently at another institution by  another surgeon in the remote past and she does not have any  recollection for history, it was felt that on examination she had  absence of inflow into the extremity and therefore to prevent further  loss of tissue or limb, I recommended the procedure of an AF DSA  followed by revascularization of appropriate and also at the same time  debridement. We did find an area of total inflow thrombosis, which  remedied by the bypass graft and then we performed the debridement. OPERATIVE PROCEDURE:  The procedure is as follows.   With the patient  properly identified and prepared, we cut down on the right brachial  artery after multiple attempts at percutaneous puncture were  unsuccessful and then we cannulated the brachial artery in retrograde  fashion under direct vision with a Seldinger needle and advanced our  guidewire a 5-Welsh sheath and catheter system into the thorax and then  down the descending thoracic aorta into the abdomen. Using a 5-Welsh  racket catheter and then a 5-Welsh straight diagnostic catheter, we  incorporated down into our femoral system and performed the DSA study to  our need. The findings of the DSA study were the followin. Patency to the infrarenal abdominal aorta. 2.  Patency to the bilateral iliac arteries. 3.  Flow into the right internal and external iliac artery. 4.  Total occlusion of the right common femoral artery. 5.  Prosthesis in the right hip. 6.  Absence of flow seen in the right SFA. 7.  Flow in the right profunda femoral artery, which by collaterals fill  down to the level of the distal thigh and below-the-knee to the stump. Once we finished our DSA studies, we removed our sheath from the right  arm and we closed the small incision with a 5-0 Prolene and then we  opened the right groin to expose a very gnarled appearing vascular  structure and we skeletonized this up to the level below the inguinal  ligament where there was native vessel. We identified and outlined by  dissection the profunda femoral artery and then we opened the artery  here which was totally thrombosed in the right SFA including removing  the proximal 3 cm of old graft and then we opened into the PFA, which we  did a thrombectomy on and automatically establishing brisk retrograde  flow, we clamped DFA was a micro clamp and then we thrombectomized the  inflow from the external iliac artery and placed a Satinsky clamp on  this. We administered 7500 units of heparin systemically.   We divided  the femoral artery directly and we used a Propaten 7 mm graft and  sutured the graft to the artery end-to-end when this was completed, we  then measured and divided in an double fashion the distal portion of the  graft, which we would sew to the PFA, which was done with 5-0 Prolene  suture. The graft length was about 4 cm. With this completed, we then  performed a completion DSA study showing excellent flow in our common  femoral to profunda femoral graft and distal flow into the profunda  femoral system. We irrigated the wound, checked for hemostasis and  closed with this now accomplished. We then addressed our attention to  the medial side of the dried eschar necrotic area, which measured a  length of 6 cm with a width of 4.5 cm and a depth of 3 cm. We worked  our way down vertically from the perimeter of this eschar to the level  of satisfactory clean tissue, there was no odor or drainage. We used a  scalpel for this debridement. The edges of the wound were oozing, which  we hemostased with electrocautery. We dissected down the skin,  subcutaneous tissue and fascia and down to the level of the tendon. It  was done simply with scalpel. When this was completed, we then packed  the wound with simple 1-inch packing and then 4x4s above this and 6-inch  _____ above this. The patient at the conclusion of surgery [facility] in a satisfactory general  condition. The estimated use of contrast was 50 mL of Optiray 320 and  the fluoroscopy time was less than 5 minutes. There were no family  members immediately available for consultation.         Dominique Hill MD    D: 06/11/2019 17:50:40       T: 06/11/2019 17:55:26     [id number]  Job#: [id number]     Doc#: [id number]    CC:
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
## 2019-06-13 ENCOUNTER — APPOINTMENT (OUTPATIENT)
Dept: INTERVENTIONAL RADIOLOGY/VASCULAR | Age: 59
DRG: 950 | End: 2019-06-13
Payer: MEDICAID

## 2019-06-13 LAB
ANION GAP SERPL CALCULATED.3IONS-SCNC: 12 MEQ/L (ref 9–15)
BASOPHILS ABSOLUTE: 0 K/UL (ref 0–0.2)
BASOPHILS RELATIVE PERCENT: 0.7 %
BUN BLDV-MCNC: 6 MG/DL (ref 6–20)
CALCIUM SERPL-MCNC: 7.8 MG/DL (ref 8.5–9.9)
CHLORIDE BLD-SCNC: 110 MEQ/L (ref 95–107)
CO2: 23 MEQ/L (ref 20–31)
CREAT SERPL-MCNC: 1.09 MG/DL (ref 0.5–0.9)
EOSINOPHILS ABSOLUTE: 0.1 K/UL (ref 0–0.7)
EOSINOPHILS RELATIVE PERCENT: 2.1 %
GFR AFRICAN AMERICAN: >60
GFR NON-AFRICAN AMERICAN: 51.2
GLUCOSE BLD-MCNC: 108 MG/DL (ref 70–99)
GLUCOSE BLD-MCNC: 109 MG/DL (ref 60–115)
GLUCOSE BLD-MCNC: 110 MG/DL (ref 60–115)
GLUCOSE BLD-MCNC: 126 MG/DL (ref 60–115)
GLUCOSE BLD-MCNC: 128 MG/DL (ref 60–115)
HCT VFR BLD CALC: 29.5 % (ref 37–47)
HEMOGLOBIN: 10.3 G/DL (ref 12–16)
LYMPHOCYTES ABSOLUTE: 1.4 K/UL (ref 1–4.8)
LYMPHOCYTES RELATIVE PERCENT: 22.3 %
MAGNESIUM: 2.1 MG/DL (ref 1.7–2.4)
MCH RBC QN AUTO: 34.7 PG (ref 27–31.3)
MCHC RBC AUTO-ENTMCNC: 35 % (ref 33–37)
MCV RBC AUTO: 99.4 FL (ref 82–100)
MONOCYTES ABSOLUTE: 0.8 K/UL (ref 0.2–0.8)
MONOCYTES RELATIVE PERCENT: 13.2 %
NEUTROPHILS ABSOLUTE: 3.9 K/UL (ref 1.4–6.5)
NEUTROPHILS RELATIVE PERCENT: 61.7 %
PDW BLD-RTO: 12.8 % (ref 11.5–14.5)
PERFORMED ON: ABNORMAL
PERFORMED ON: ABNORMAL
PERFORMED ON: NORMAL
PERFORMED ON: NORMAL
PLATELET # BLD: 163 K/UL (ref 130–400)
POTASSIUM REFLEX MAGNESIUM: 3.1 MEQ/L (ref 3.4–4.9)
RBC # BLD: 2.97 M/UL (ref 4.2–5.4)
SODIUM BLD-SCNC: 145 MEQ/L (ref 135–144)
VANCOMYCIN RANDOM: 21.6 UG/ML (ref 10–40)
WBC # BLD: 6.3 K/UL (ref 4.8–10.8)

## 2019-06-13 PROCEDURE — 2580000003 HC RX 258: Performed by: THORACIC SURGERY (CARDIOTHORACIC VASCULAR SURGERY)

## 2019-06-13 PROCEDURE — 2580000003 HC RX 258: Performed by: INTERNAL MEDICINE

## 2019-06-13 PROCEDURE — 83735 ASSAY OF MAGNESIUM: CPT

## 2019-06-13 PROCEDURE — 6360000002 HC RX W HCPCS: Performed by: INTERNAL MEDICINE

## 2019-06-13 PROCEDURE — 99232 SBSQ HOSP IP/OBS MODERATE 35: CPT | Performed by: INTERNAL MEDICINE

## 2019-06-13 PROCEDURE — 36415 COLL VENOUS BLD VENIPUNCTURE: CPT

## 2019-06-13 PROCEDURE — 85025 COMPLETE CBC W/AUTO DIFF WBC: CPT

## 2019-06-13 PROCEDURE — 2060000000 HC ICU INTERMEDIATE R&B

## 2019-06-13 PROCEDURE — 6370000000 HC RX 637 (ALT 250 FOR IP): Performed by: INTERNAL MEDICINE

## 2019-06-13 PROCEDURE — 80202 ASSAY OF VANCOMYCIN: CPT

## 2019-06-13 PROCEDURE — 2500000003 HC RX 250 WO HCPCS: Performed by: INTERNAL MEDICINE

## 2019-06-13 PROCEDURE — 2709999900 IR PICC WO SQ PORT/PUMP > 5 YEARS

## 2019-06-13 PROCEDURE — 80048 BASIC METABOLIC PNL TOTAL CA: CPT

## 2019-06-13 PROCEDURE — 6370000000 HC RX 637 (ALT 250 FOR IP): Performed by: THORACIC SURGERY (CARDIOTHORACIC VASCULAR SURGERY)

## 2019-06-13 PROCEDURE — 97535 SELF CARE MNGMENT TRAINING: CPT

## 2019-06-13 PROCEDURE — 36573 INSJ PICC RS&I 5 YR+: CPT | Performed by: RADIOLOGY

## 2019-06-13 PROCEDURE — 02HV33Z INSERTION OF INFUSION DEVICE INTO SUPERIOR VENA CAVA, PERCUTANEOUS APPROACH: ICD-10-PCS | Performed by: INTERNAL MEDICINE

## 2019-06-13 RX ORDER — POTASSIUM CHLORIDE 20 MEQ/1
40 TABLET, EXTENDED RELEASE ORAL PRN
Status: DISCONTINUED | OUTPATIENT
Start: 2019-06-13 | End: 2019-06-14 | Stop reason: HOSPADM

## 2019-06-13 RX ORDER — POTASSIUM CHLORIDE 7.45 MG/ML
10 INJECTION INTRAVENOUS PRN
Status: DISCONTINUED | OUTPATIENT
Start: 2019-06-13 | End: 2019-06-14 | Stop reason: HOSPADM

## 2019-06-13 RX ORDER — POTASSIUM CHLORIDE 1.5 G/1.77G
40 POWDER, FOR SOLUTION ORAL PRN
Status: DISCONTINUED | OUTPATIENT
Start: 2019-06-13 | End: 2019-06-14 | Stop reason: HOSPADM

## 2019-06-13 RX ORDER — SODIUM CHLORIDE 9 MG/ML
250 INJECTION, SOLUTION INTRAVENOUS ONCE
Status: COMPLETED | OUTPATIENT
Start: 2019-06-13 | End: 2019-06-13

## 2019-06-13 RX ORDER — LIDOCAINE HYDROCHLORIDE 20 MG/ML
5 INJECTION, SOLUTION INFILTRATION; PERINEURAL ONCE
Status: COMPLETED | OUTPATIENT
Start: 2019-06-13 | End: 2019-06-13

## 2019-06-13 RX ORDER — SODIUM CHLORIDE 0.9 % (FLUSH) 0.9 %
10 SYRINGE (ML) INJECTION PRN
Status: DISCONTINUED | OUTPATIENT
Start: 2019-06-13 | End: 2019-06-14 | Stop reason: HOSPADM

## 2019-06-13 RX ORDER — SODIUM CHLORIDE 0.9 % (FLUSH) 0.9 %
10 SYRINGE (ML) INJECTION EVERY 12 HOURS SCHEDULED
Status: DISCONTINUED | OUTPATIENT
Start: 2019-06-13 | End: 2019-06-14 | Stop reason: HOSPADM

## 2019-06-13 RX ADMIN — Medication 10 ML: at 21:43

## 2019-06-13 RX ADMIN — DIVALPROEX SODIUM 125 MG: 125 CAPSULE, COATED PELLETS ORAL at 14:06

## 2019-06-13 RX ADMIN — Medication 10 ML: at 22:55

## 2019-06-13 RX ADMIN — OXYCODONE HYDROCHLORIDE AND ACETAMINOPHEN 1 TABLET: 10; 325 TABLET ORAL at 08:59

## 2019-06-13 RX ADMIN — PHENYTOIN SODIUM 100 MG: 100 CAPSULE ORAL at 11:08

## 2019-06-13 RX ADMIN — Medication 10 ML: at 08:36

## 2019-06-13 RX ADMIN — OXYCODONE HYDROCHLORIDE AND ACETAMINOPHEN 1 TABLET: 10; 325 TABLET ORAL at 17:21

## 2019-06-13 RX ADMIN — SODIUM CHLORIDE 250 ML: 9 INJECTION, SOLUTION INTRAVENOUS at 10:25

## 2019-06-13 RX ADMIN — PHENYTOIN SODIUM 200 MG: 100 CAPSULE ORAL at 08:33

## 2019-06-13 RX ADMIN — ATORVASTATIN CALCIUM 10 MG: 10 TABLET, FILM COATED ORAL at 21:42

## 2019-06-13 RX ADMIN — ZONISAMIDE 100 MG: 100 CAPSULE ORAL at 08:33

## 2019-06-13 RX ADMIN — METOPROLOL SUCCINATE 25 MG: 25 TABLET, EXTENDED RELEASE ORAL at 08:32

## 2019-06-13 RX ADMIN — LIDOCAINE HYDROCHLORIDE 5 ML: 20 INJECTION, SOLUTION INFILTRATION; PERINEURAL at 10:25

## 2019-06-13 RX ADMIN — VITAMIN D, TAB 1000IU (100/BT) 1000 UNITS: 25 TAB at 08:33

## 2019-06-13 RX ADMIN — TRAZODONE HYDROCHLORIDE 50 MG: 50 TABLET ORAL at 21:42

## 2019-06-13 RX ADMIN — PIPERACILLIN SODIUM,TAZOBACTAM SODIUM 3.38 G: 3; .375 INJECTION, POWDER, FOR SOLUTION INTRAVENOUS at 11:08

## 2019-06-13 RX ADMIN — PHENYTOIN SODIUM 200 MG: 100 CAPSULE ORAL at 21:42

## 2019-06-13 RX ADMIN — PIPERACILLIN SODIUM,TAZOBACTAM SODIUM 3.38 G: 3; .375 INJECTION, POWDER, FOR SOLUTION INTRAVENOUS at 22:54

## 2019-06-13 RX ADMIN — FUROSEMIDE 40 MG: 40 TABLET ORAL at 08:32

## 2019-06-13 RX ADMIN — MORPHINE SULFATE 4 MG: 4 INJECTION INTRAVENOUS at 04:12

## 2019-06-13 RX ADMIN — DIVALPROEX SODIUM 125 MG: 125 CAPSULE, COATED PELLETS ORAL at 08:33

## 2019-06-13 RX ADMIN — PIPERACILLIN SODIUM,TAZOBACTAM SODIUM 3.38 G: 3; .375 INJECTION, POWDER, FOR SOLUTION INTRAVENOUS at 00:26

## 2019-06-13 RX ADMIN — POTASSIUM CHLORIDE 40 MEQ: 1500 TABLET, EXTENDED RELEASE ORAL at 11:30

## 2019-06-13 RX ADMIN — OXYCODONE HYDROCHLORIDE AND ACETAMINOPHEN 1 TABLET: 10; 325 TABLET ORAL at 00:26

## 2019-06-13 RX ADMIN — DOCUSATE SODIUM 100 MG: 100 CAPSULE, LIQUID FILLED ORAL at 08:33

## 2019-06-13 RX ADMIN — DULOXETINE HYDROCHLORIDE 30 MG: 30 CAPSULE, DELAYED RELEASE ORAL at 08:32

## 2019-06-13 RX ADMIN — VANCOMYCIN 1250 MG: 1 INJECTION, SOLUTION INTRAVENOUS at 22:55

## 2019-06-13 RX ADMIN — DIVALPROEX SODIUM 125 MG: 125 CAPSULE, COATED PELLETS ORAL at 21:42

## 2019-06-13 ASSESSMENT — PAIN DESCRIPTION - LOCATION
LOCATION: LEG

## 2019-06-13 ASSESSMENT — PAIN SCALES - GENERAL
PAINLEVEL_OUTOF10: 0
PAINLEVEL_OUTOF10: 2
PAINLEVEL_OUTOF10: 0
PAINLEVEL_OUTOF10: 0
PAINLEVEL_OUTOF10: 10
PAINLEVEL_OUTOF10: 0
PAINLEVEL_OUTOF10: 0
PAINLEVEL_OUTOF10: 8
PAINLEVEL_OUTOF10: 0
PAINLEVEL_OUTOF10: 10
PAINLEVEL_OUTOF10: 0
PAINLEVEL_OUTOF10: 10
PAINLEVEL_OUTOF10: 10
PAINLEVEL_OUTOF10: 0

## 2019-06-13 ASSESSMENT — PAIN DESCRIPTION - ORIENTATION
ORIENTATION: RIGHT

## 2019-06-13 ASSESSMENT — PAIN DESCRIPTION - PAIN TYPE
TYPE: SURGICAL PAIN

## 2019-06-13 NOTE — PROGRESS NOTES
Lynwood Denver  1960  female  Medical Record Number: [id number]    Patient informed that I am an Infectious Disease physician and permission obtained from the patient to speak in front of any visitors prior to any discussion for HIPPA purposes. HPI: Patient with R BKA stump wound ( BKA 3 years ago )   With tissue necrosis and erythema  S/p revascularization and currently in the ICU  Debridement by podiatry. Resting, complains of pain in the stump. Vac in place.        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     Athscl heart disease of native coronary artery w/o ang pctrs     CAD (coronary artery disease)     Cancer (HCC)     right BKA    Constipation     Constipation     Depression     MAJOR DEPRESSIVE DISORDER    Diabetes mellitus ([facility])     TYPE II    Diabetic neuropathy ([facility])     Hyperlipidemia     Hypertension     Insomnia     Muscle weakness     PVD (peripheral vascular disease) ([facility])     Seizures ([facility])     Vascular dementia     Vitamin D deficiency     Weakness        Past Surgical History:   Procedure Laterality Date    ARTERIOGRAM Right 6/11/2019    RIGHT ARM AORTO FEMORAL DIGITAL SUBTRACTION ARTERIOGRAM performed by Yakov Swanson MD at [address] [place] [address] Right 6/11/2019    REVISION AND REVASCULARIZATION RIGHT LOWER EXTREMITY performed by Yakov Swanson MD at [address] [place] [address] Right        Social History     Socioeconomic History    Marital status: Single     Spouse name: Not on file    Number of children: Not on file    Years of education: Not on file    Highest education level: Not on file   Occupational History    Not on file   Social Needs    Financial resource strain: Not on file    Food insecurity:     Worry: Not on file     Inability: Not on file    Transportation needs:     Medical: Not on file     Non-medical: Not on file   Tobacco Use    Smoking status: Current Every Day Smoker     Packs/day: 0.50     Years: 13.00     Pack years: 6.50     Types: Cigarettes    Smokeless tobacco: Never Used   Substance and Sexual Activity    Alcohol use: No     Comment: in nursing home for 3 years    Drug use: No    Sexual activity: Not Currently   Lifestyle    Physical activity:     Days per week: Not on file     Minutes per session: Not on file    Stress: Not on file   Relationships    Social connections:     Talks on phone: Not on file     Gets together: Not on file     Attends [employer] service: Not on file     Active member of club or organization: Not on file     Attends meetings of clubs or organizations: Not on file     Relationship status: Not on file    Intimate partner violence:     Fear of current or ex partner: Not on file     Emotionally abused: Not on file     Physically abused: Not on file     Forced sexual activity: Not on file   Other Topics Concern    Not on file   Social History Narrative    Not on file       History reviewed. No pertinent family history. No current facility-administered medications on file prior to encounter. Current Outpatient Medications on File Prior to Encounter   Medication Sig Dispense Refill    divalproex (DEPAKOTE SPRINKLE) 125 MG capsule Take 125 mg by mouth 3 times daily      furosemide (LASIX) 10 MG/ML injection Infuse 40 mg intravenously daily      LORazepam (ATIVAN) 1 MG tablet Take 1 mg by mouth every 4 hours as needed (Seizures).  oxyCODONE (ROXICODONE) 5 MG immediate release tablet Take 5 mg by mouth every 8 hours as needed for Pain.       potassium chloride (MICRO-K) 10 MEQ extended release capsule Take 10 mEq by mouth daily      ipratropium-albuterol (DUONEB) 0.5-2.5 (3) MG/3ML SOLN nebulizer solution Inhale 1 vial into the lungs every 8 hours as needed for Shortness of Breath (COPD)      divalproex (DEPAKOTE SPRINKLE) 125 MG capsule Take 125 mg by mouth 2 times daily      [START ON 6/19/2019] divalproex (DEPAKOTE SPRINKLE) 125 MG capsule Take 125 mg by mouth daily      DULoxetine (CYMBALTA) 30 MG extended release capsule Take 1 capsule by mouth daily 30 capsule 3    phenytoin (DILANTIN) 100 MG ER capsule Take 200 mg by mouth 2 times daily MORNING AND AT BEDTIME      traZODone (DESYREL) 50 MG tablet Take 50 mg by mouth nightly      docusate sodium (COLACE) 100 MG capsule Take 100 mg by mouth every morning      aspirin 81 MG chewable tablet Take 81 mg by mouth daily      atorvastatin (LIPITOR) 10 MG tablet Take 10 mg by mouth nightly       vitamin D 1000 UNITS CAPS Take 1 capsule by mouth daily       isosorbide mononitrate (IMDUR) 30 MG extended release tablet Take 30 mg by mouth daily      furosemide (LASIX) 20 MG tablet Take 40 mg by mouth daily       magnesium hydroxide (MILK OF MAGNESIA) 400 MG/5ML suspension Take 30 mLs by mouth daily as needed for Constipation      phenytoin (DILANTIN) 100 MG ER capsule Take 100 mg by mouth Daily with lunch       metoprolol succinate (TOPROL XL) 25 MG extended release tablet Take 25 mg by mouth daily      zonisamide (ZONEGRAN) 100 MG capsule Take 100 mg by mouth daily         Physical Exam:  General: Patient appears uncomfortable. Skin: no new rashes   Obese patient. HEENT: EOMI, MMM, Neck is supple  Heart: S1 S2  Lungs: bilaterally clear to auscultation  Abdomen: soft, ND, NTTP, +BS  Extrem:+RLE deep full thickness ulcer with vac  Neuro exam: CN II-XII intact, facial expressions symmertrical bilaterally, no slurred speech      Labs: I have reviewed all lab results by electronic record, including most recent CBC, metabolic panel, and pertinent abnormalities were addressed from an infectious disease perspective. WBC trends are being monitored.     Lab Results   Component Value Date     06/13/2019    K 3.1 06/13/2019     06/13/2019    CO2 23 06/13/2019    BUN 6 06/13/2019    CREATININE 1.09 06/13/2019 GLUCOSE 108 06/13/2019    GLUCOSE 94 12/28/2011    CALCIUM 7.8 06/13/2019      Lab Results   Component Value Date    WBC 6.3 06/13/2019    HGB 10.3 (L) 06/13/2019    HCT 29.5 (L) 06/13/2019    MCV 99.4 06/13/2019     06/13/2019       Radiology:   I have reviewed imaging results per electronic record and most pertinent abnormalities are being addressed from an infectious disease standpoint. Assessment:  R BKA wound s/p revascularization and debridement  Obese patient  Cellulitis of RLE    Plan:  Discussed pain control with nurse. Nica Daigle trough 18  Supportive care    Planning 2 weeks of IV abx.          Martha Rick D.O.

## 2019-06-13 NOTE — PROGRESS NOTES
0800: loss IV d/t infiltration, site pink, no ss of infection, pt denies pain. perfectserve sent to dr. Sandip Vazquez re: PICC needed. 1000: pt down for PICC. 1030: called report to [place] [address] [place]    1135: pt placed for transport, came back from specials to ICU. Sent random [facility] lab work, called lab to inform didn't have printed label. Medications given, dr. Nakul Manrique re: low potassium this am, replaced potassium with oral PRN replacement that was ordered by dr. Rojas Rater. Checked BS, called dietary to send tray to 191. Called and updated byron MARTINEZ.

## 2019-06-13 NOTE — PROGRESS NOTES
Pharmacy Vancomycin Consult     Vancomycin Day: 7  Current Dosing: vancomycin 1250mg IV q12 hours    Recent Labs     06/12/19  0605 06/13/19  0452   BUN 4* 6       Recent Labs     06/12/19  0605 06/13/19  0452   CREATININE 0.52 1.09*       Recent Labs     06/12/19  0806 06/13/19  0452   WBC 7.4 6.3         Intake/Output Summary (Last 24 hours) at 6/13/2019 1412  Last data filed at 6/13/2019 0802  Gross per 24 hour   Intake 2562 ml   Output 3500 ml   Net -938 ml       Culture Date      Source                       Results  Culture Blood #2 [[id number]] Collected: 06/07/19 1354   Order Status: Completed Specimen: Blood Updated: 06/12/19 1615    Culture, Blood 2 No growth after 5 days of incubation. Narrative:     ORDER#: [id number]                          ORDERED BY: Lucy Baires  SOURCE: Blood                              COLLECTED:  06/07/19 13:54  ANTIBIOTICS AT [place].:                      RECEIVED :  06/07/19 13:55   Culture Blood #1 [[id number]] Collected: 06/07/19 1255   Order Status: Completed Specimen: Blood Updated: 06/12/19 1415    Blood Culture, Routine No growth after 5 days of incubation. Narrative:     ORDER#: [id number]                          ORDERED BY: Clarence ANAYA Citifilipe  SOURCE: Blood Blood                        COLLECTED:  06/07/19 12:55  ANTIBIOTICS AT [place].:                      RECEIVED :  06/07/19 12:55   Wound Culture [[id number]]    Order Status: Canceled Specimen: Leg    CULTURE BLOOD #1 [[id number]] Collected: 06/07/19 0000   Order Status: Canceled    CULTURE BLOOD #2 [[id number]] Collected: 06/07/19 0000   Order Status: Canceled    Culture Blood #1 [[id number]] Collected: 06/07/19   Order Status: Canceled    Culture Blood #2 [[id number]] Collected: 06/07/19       Ht Readings from Last 1 Encounters:   06/07/19 5' 2\" (1.575 m)        Wt Readings from Last 1 Encounters:   06/13/19 215 lb 9.8 oz (97.8 kg)         Body mass index is 39.44 kg/m².     Estimated Creatinine Clearance: 61 mL/min (A) (based on SCr of 1.09 mg/dL (H)). Random: 21.6mcg/mL - 12 hours since last dose of vancomycin (last dose given @ 8228 on 6/12/19)     Assessment/Plan:  Random vancomycin level was drawn and resulted at 21.6mcg/mL, which is above goal range of 10-20mcg/mL for wound infection. As such, will hold vancomycin therapy until this evening, at 2300. At that time, vancomycin will have cleared such that predicted trough level would be <20mcg/mL. Will start therapy with vancomycin 1250mg IV Q24 hours (24 hours since last administered dosage of vancomycin and 12 hours since random vancomycin level obtained), and obtain a trough level prior to the 3rd total dose at this dosing strength and interval, if patient remains stable. Random vancomycin level may be warranted tomorrow depending on if renal function remains stable or if SCr continues to rise. No levels ordered at this time, as will monitor patient closely.      Bruno Branch, PharmD   6/13/2019 2:15 PM

## 2019-06-13 NOTE — PROGRESS NOTES
PODIATRIC MEDICINE AND SURGERY  CONSULT PROGRESS NOTE    Consulting Service:  Vascular Surgery  Requesting Provider: Sharri Garcia  Follow up regarding: R BKA stump possible wound vac  Staff Doctor:  Radha    ASSESSMENT:  60 y.o. female with PMH significant for PAD, DM, CAD. She presented with a necrotic and infection ulceration to her right BKA stump. She is now s/p RLE revascularization and R BKA stump wound debridement by Dr. Sharri Garcia. Podiatry was consulted for evaluation and treatment of right BKA stump wound. Clinically, there is a large and deep full thickness ulceration to the medial aspect of the right BKA stump. Currently treating with wound vac without issues. PLAN AND RECOMMENDATIONS[de-identified]  Discussed with staff, Dr. Jairo House, who will provide final recommendations  Continue wound vac at 100 mmHg of continuous pressure  NWB to RLE  Elevate RLE at or above heart level while resting  Antibiotic coverage per ID   Pain management per primary team   Podiatry to follow while in house  Will need to follow up with Dr. Jairo House in the [address] [place] [address] Road within 10 days of discharge. Will be discharged on vac    INTERVAL HISTORY: Low grade fever overnight. On 2 L NC. No acute events overnight. Admits to pain at the wound site. Patient denies nausea, vomiting, diarrhea, fevers, or chills. No other pedal complaints. Pertinent history: 60y.o. year old female was seen today for at the request of vascular surgery for evaluation of R BKA stump wound. Patient states that she had a R BKA 3 years ago. She presented with a necrotic and infection ulceration to her right BKA stump. She is now s/p RLE revascularization and R BKA stump wound debridement. No current facility-administered medications on file prior to encounter.       Current Outpatient Medications on File Prior to Encounter   Medication Sig Dispense Refill    divalproex (DEPAKOTE SPRINKLE) 125 MG capsule Take 125 mg by mouth 3 times daily      furosemide (LASIX) 10 MG/ML injection Infuse 40 mg intravenously daily      LORazepam (ATIVAN) 1 MG tablet Take 1 mg by mouth every 4 hours as needed (Seizures).  oxyCODONE (ROXICODONE) 5 MG immediate release tablet Take 5 mg by mouth every 8 hours as needed for Pain.       potassium chloride (MICRO-K) 10 MEQ extended release capsule Take 10 mEq by mouth daily      ipratropium-albuterol (DUONEB) 0.5-2.5 (3) MG/3ML SOLN nebulizer solution Inhale 1 vial into the lungs every 8 hours as needed for Shortness of Breath (COPD)      divalproex (DEPAKOTE SPRINKLE) 125 MG capsule Take 125 mg by mouth 2 times daily      [START ON 6/19/2019] divalproex (DEPAKOTE SPRINKLE) 125 MG capsule Take 125 mg by mouth daily      DULoxetine (CYMBALTA) 30 MG extended release capsule Take 1 capsule by mouth daily 30 capsule 3    phenytoin (DILANTIN) 100 MG ER capsule Take 200 mg by mouth 2 times daily MORNING AND AT BEDTIME      traZODone (DESYREL) 50 MG tablet Take 50 mg by mouth nightly      docusate sodium (COLACE) 100 MG capsule Take 100 mg by mouth every morning      aspirin 81 MG chewable tablet Take 81 mg by mouth daily      atorvastatin (LIPITOR) 10 MG tablet Take 10 mg by mouth nightly       vitamin D 1000 UNITS CAPS Take 1 capsule by mouth daily       isosorbide mononitrate (IMDUR) 30 MG extended release tablet Take 30 mg by mouth daily      furosemide (LASIX) 20 MG tablet Take 40 mg by mouth daily       magnesium hydroxide (MILK OF MAGNESIA) 400 MG/5ML suspension Take 30 mLs by mouth daily as needed for Constipation      phenytoin (DILANTIN) 100 MG ER capsule Take 100 mg by mouth Daily with lunch       metoprolol succinate (TOPROL XL) 25 MG extended release tablet Take 25 mg by mouth daily      zonisamide (ZONEGRAN) 100 MG capsule Take 100 mg by mouth daily       Review of Systems  See interval history    OBJECTIVE:  BP (!) 126/53   Pulse 92   Temp 99.5 °F (37.5 °C) (Oral)   Resp 15   Ht 5' 2\" (1.575 m)   Wt 215 lb 9.8 oz (97.8 kg)   SpO2 95%   BMI 39.44 kg/m²   Patient is alert and oriented to person, place, and time    Vascular:   Nonpalpable Dorsalis Pedis and nonpalpable Posterior Tibial Pulses of the left foot with audible signal  Audible popliteal pulse of RLE    Neurological:   Sensation to light touch intact B/L    Musculoskeletal/Orthopaedic:   Structural Deformities: R BKA  Gross motor function intact to BLE    Dermatological:   No open lesions to left foot  Wound vac in place to R BKA stump ulceration  No signs of leaks or vac failure  No signs of infection     LABS:   Lab Results   Component Value Date    WBC 6.3 06/13/2019    HGB 10.3 (L) 06/13/2019    HCT 29.5 (L) 06/13/2019    MCV 99.4 06/13/2019     06/13/2019     Lab Results   Component Value Date     06/13/2019    K 3.1 06/13/2019     06/13/2019    CO2 23 06/13/2019    BUN 6 06/13/2019    CREATININE 1.09 06/13/2019    GLUCOSE 108 06/13/2019    GLUCOSE 94 12/28/2011    CALCIUM 7.8 06/13/2019      Lab Results   Component Value Date    LABALBU 3.8 06/07/2019           Gallito Molina DPM PGY-2  Podiatric Surgery Resident  Podiatry On Call Pager: [phone]  June 13, 2019   8:34 AM

## 2019-06-13 NOTE — PROGRESS NOTES
[facility] EMERGENCY [facility] AT [facility] Respiratory Therapy Evaluation   Current Order:  DUONEB Q8      Home Regimen: Y      Ordering Physician: ABBEY  Re-evaluation Date:  EVAL DONE     Diagnosis: DEBRED      Patient Status: Stable / Unstable + Physician notified    The following MDI Criteria must be met in order to convert aerosol to MDI with spacer. If unable to meet, MDI will be converted to aerosol:  []  Patient able to demonstrate the ability to use MDI effectively  []  Patient alert and cooperative  []  Patient able to take deep breath with 5-10 second hold  []  Medication(s) available in this delivery method   []  Peak flow greater than or equal to 200 ml/min            Current Order Substituted To  (same drug, same frequency)   Aerosol to MDI [] Albuterol Sulfate 0.083% unit dose by aerosol Albuterol Sulfate MDI 2 puffs by inhalation with spacer    [] Levalbuterol 1.25 mg unit dose by aerosol Levalbuterol MDI 2 puffs by inhalation with spacer    [] Levalbuterol 0.63 mg unit dose by aerosol Levalbuterol MDI 2 puffs by inhalation with spacer    [] Ipratropium Bromide 0.02% unit dose by aerosol Ipratropium Bromide MDI 2 puffs by inhalation with spacer    [] Duoneb (Ipratropium + Albuterol) unit dose by aerosol Ipratropium MDI + Albuterol MDI 2 puffs by inhalation w/spacer   MDI to Aerosol [] Albuterol Sulfate MDI Albuterol Sulfate 0.083% unit dose by aerosol    [] Levalbuterol MDI 2 puffs by inhalation Levalbuterol 1.25 mg unit dose by aerosol    [] Ipratropium Bromide MDI by inhalation Ipratropium Bromide 0.02% unit dose by aerosol    [] Combivent (Ipratropium + Albuterol) MDI by inhalation Duoneb (Ipratropium + Albuterol) unit dose by aerosol   Treatment Assessment [Frequency/Schedule]:  Change frequency to: ________________NO CHANGE HOME MED__________________________________per Protocol, P&T, MEC      Points 0 1 2 3 4   Pulmonary Status  Non-Smoker  []   Smoking history   < 20 pack years  [x]   Smoking history  ?  20 pack years  [] Pulmonary Disorder  (acute or chronic)  []   Severe or Chronic w/ Exacerbation  []     Surgical Status No []   Surgeries     General [x]   Surgery Lower []   Abdominal Thoracic or []   Upper Abdominal Thoracic with  PulmonaryDisorder  []     Chest X-ray Clear/Not  Ordered     [x]  Chronic Changes  Results Pending  []  Infiltrates, atelectasis, pleural effusion, or edema  []  Infiltrates in more than one lobe []  Infiltrate + Atelectasis, &/or pleural effusion  []    Respiratory Pattern Regular,  RR = 12-20 [x]  Increased,  RR = 21-25 []  FONSECA, irregular,  or RR = 26-30 []  Decreased FEV1  or RR = 31-35 []  Severe SOB, use  of accessory muscles, or RR ? 35  []    Mental Status Alert, oriented,  Cooperative [x]  Confused but Follows commands []  Lethargic or unable to follow commands []  Obtunded  []  Comatose  []    Breath Sounds Clear to  auscultation  []  Decreased unilaterally or  in bases only []  Decreased  bilaterally  []  Crackles or intermittent wheezes []  Wheezes []    Cough Strong, Spontan., & nonproductive []  Strong,  spontaneous, &  productive []  Weak,  Nonproductive []  Weak, productive or  with wheezes []  No spontaneous  cough or may require suctioning []    Level of Activity Ambulatory []  Ambulatory w/ Assist  []  Non-ambulatory [x]  Paraplegic []  Quadriplegic []    Total    Score:_5______     Triage Score:__5______      Tri       Triage:     1. (>20) Freq: Q3    2. (16-20) Freq: Q4   3. (11-15) Freq: QID & Albuterol Q2 PRN    4. (6-10) Freq: TID & Albuterol Q2 PRN    5. (0-5) Freq Q4prn

## 2019-06-13 NOTE — CARE COORDINATION
Plan remains to return to [address] [place] [address] at discharge. Renetta Trinidad aware of need for wound vac. She remains on IV vanco and zosyn. Patient already has PICC. Need plan from ID. Patient moved to [address] [place] [address] report to [employer].   Electronically signed by Joan Mixon RN on 6/13/19 at 2:47 PM

## 2019-06-13 NOTE — PROGRESS NOTES
Physician Progress Note    2019   12:27 PM    Name:  Adore Damon  MRN:    [id number]     [facility] Day: 6     Admit Date: 2019 10:56 AM  PCP: Nico Santa MD    Code Status:  Full Code    Subjective:     No new symptoms. Pain is controlled. Physical Examination:      Vitals:  /61   Pulse 93   Temp 98.8 °F (37.1 °C) (Oral)   Resp 20   Ht 5' 2\" (1.575 m)   Wt 215 lb 9.8 oz (97.8 kg)   SpO2 95%   BMI 39.44 kg/m²   Temp (24hrs), Av.6 °F (37.6 °C), Min:98.8 °F (37.1 °C), Max:100.4 °F (38 °C)      General appearance: alert, cooperative and no distress  Mental Status: oriented to person, place and time and normal affect  Lungs: clear to auscultation bilaterally, normal effort  Heart: regular rate and rhythm, no murmur  Abdomen: soft, nontender, nondistended, bowel sounds present, no masses  Extremities: no edema, redness, tenderness in the calves  Skin: right stump with erythema and necrotic 5x5 cm wounds        Data:     Labs:  Recent Labs     19  0806 19  0452   WBC 7.4 6.3   HGB 11.0* 10.3*    163     Recent Labs     19  0605 19  0452    145*   K 3.7 3.1*   * 110*   CO2 18* 23   BUN 4* 6   CREATININE 0.52 1.09*   GLUCOSE 100* 108*     No results for input(s): AST, ALT, ALB, BILITOT, ALKPHOS in the last 72 hours.     Current Facility-Administered Medications   Medication Dose Route Frequency Provider Last Rate Last Dose    sodium chloride flush 0.9 % injection 10 mL  10 mL Intravenous 2 times per day Monalisa Reeve, DO        sodium chloride flush 0.9 % injection 10 mL  10 mL Intravenous PRN Monalisa Reeve, DO        potassium chloride (KLOR-CON M) extended release tablet 40 mEq  40 mEq Oral PRN Monalisa Reeve, DO   40 mEq at 19 1130    Or    potassium chloride (KLOR-CON) packet 40 mEq  40 mEq Oral PRN Monalisa Reeve, DO        Or    potassium chloride 10 mEq/100 mL IVPB (Peripheral Line)  10 mEq Intravenous PRN Monalisa Franco, DO       

## 2019-06-13 NOTE — PROGRESS NOTES
Physical Therapy Med Surg Daily Treatment Note  [id number]       NAME: Estrella Guzman  : 1960 (22 y.o.)  MRN: [id number]  CODE STATUS: Full Code    Date of Service: 2019    Patient Diagnosis(es): Wound infection [T14. Irma Ip, L08.9]   Chief Complaint   Patient presents with    Wound Infection     right BKA      Patient Active Problem List    Diagnosis Date Noted    Wound infection 2019    Dementia with aggressive behavior 01/15/2019    Lethargy 2016    Ischemia of lower extremity 2013    Cerebrovascular accident, old 2013    Angiopathy, peripheral ([address] 2013    Carotid artery disease ([address] 2013    History of amputation of lower extremity through tibia and fibula ([address] 10/14/2013    Deep vein thrombosis (DVT) of lower extremity ([facility] [facility] 2013    Noncompliance with treatment 10/14/2009        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     Athscl heart disease of native coronary artery w/o ang pctrs     CAD (coronary artery disease)     Cancer (HCC)     right BKA    Constipation     Constipation     Depression     MAJOR DEPRESSIVE DISORDER    Diabetes mellitus (HCC)     TYPE II    Diabetic neuropathy (HCC)     Hyperlipidemia     Hypertension     Insomnia     Muscle weakness     PVD (peripheral vascular disease) (HCC)     Seizures ([address]     Vascular dementia     Vitamin D deficiency     Weakness      Past Surgical History:   Procedure Laterality Date    ARTERIOGRAM Right 2019    RIGHT ARM AORTO FEMORAL DIGITAL SUBTRACTION ARTERIOGRAM performed by Angel Lopez MD at [address] [place] [address] Right 2019    REVISION AND REVASCULARIZATION RIGHT LOWER EXTREMITY performed by Angel Lopez MD at [address] [place] [address] Right Training, Home Exercise Program, Equipment Evaluation, Education, & procurement, Safety Education & Training, Positioning, Patient/Caregiver Education & Training, Balance Training, Neuromuscular Re-education  Safety Devices  Type of devices:  All fall risk precautions in place     [facility] (6 CLICK) Jules Harper 28 Inpatient Mobility Raw Score : 12      Therapy Time   Individual   Time In 1457   Time Out 1510   Minutes 13      BM/Trsf: 110 [facility], 06/13/19 at 3:16 PM

## 2019-06-14 VITALS
HEART RATE: 88 BPM | RESPIRATION RATE: 18 BRPM | SYSTOLIC BLOOD PRESSURE: 141 MMHG | TEMPERATURE: 99.2 F | OXYGEN SATURATION: 97 % | DIASTOLIC BLOOD PRESSURE: 61 MMHG | HEIGHT: 62 IN | BODY MASS INDEX: 39.75 KG/M2 | WEIGHT: 216 LBS

## 2019-06-14 LAB
ANION GAP SERPL CALCULATED.3IONS-SCNC: 12 MEQ/L (ref 9–15)
BASOPHILS ABSOLUTE: 0.1 K/UL (ref 0–0.2)
BASOPHILS RELATIVE PERCENT: 0.9 %
BUN BLDV-MCNC: 8 MG/DL (ref 6–20)
CALCIUM SERPL-MCNC: 8.6 MG/DL (ref 8.5–9.9)
CHLORIDE BLD-SCNC: 110 MEQ/L (ref 95–107)
CO2: 25 MEQ/L (ref 20–31)
CREAT SERPL-MCNC: 1.18 MG/DL (ref 0.5–0.9)
EOSINOPHILS ABSOLUTE: 0.3 K/UL (ref 0–0.7)
EOSINOPHILS RELATIVE PERCENT: 3.9 %
GFR AFRICAN AMERICAN: 56.6
GFR NON-AFRICAN AMERICAN: 46.7
GLUCOSE BLD-MCNC: 101 MG/DL (ref 60–115)
GLUCOSE BLD-MCNC: 102 MG/DL (ref 70–99)
GLUCOSE BLD-MCNC: 117 MG/DL (ref 60–115)
HCT VFR BLD CALC: 31.9 % (ref 37–47)
HEMOGLOBIN: 11 G/DL (ref 12–16)
LYMPHOCYTES ABSOLUTE: 1.5 K/UL (ref 1–4.8)
LYMPHOCYTES RELATIVE PERCENT: 20.9 %
MAGNESIUM: 2.3 MG/DL (ref 1.7–2.4)
MCH RBC QN AUTO: 34.7 PG (ref 27–31.3)
MCHC RBC AUTO-ENTMCNC: 34.6 % (ref 33–37)
MCV RBC AUTO: 100.2 FL (ref 82–100)
MONOCYTES ABSOLUTE: 0.9 K/UL (ref 0.2–0.8)
MONOCYTES RELATIVE PERCENT: 12.6 %
NEUTROPHILS ABSOLUTE: 4.4 K/UL (ref 1.4–6.5)
NEUTROPHILS RELATIVE PERCENT: 61.7 %
PDW BLD-RTO: 12.5 % (ref 11.5–14.5)
PERFORMED ON: ABNORMAL
PERFORMED ON: NORMAL
PLATELET # BLD: 191 K/UL (ref 130–400)
POTASSIUM REFLEX MAGNESIUM: 3.3 MEQ/L (ref 3.4–4.9)
RBC # BLD: 3.18 M/UL (ref 4.2–5.4)
SODIUM BLD-SCNC: 147 MEQ/L (ref 135–144)
WBC # BLD: 7.1 K/UL (ref 4.8–10.8)

## 2019-06-14 PROCEDURE — 6370000000 HC RX 637 (ALT 250 FOR IP): Performed by: THORACIC SURGERY (CARDIOTHORACIC VASCULAR SURGERY)

## 2019-06-14 PROCEDURE — 2580000003 HC RX 258: Performed by: INTERNAL MEDICINE

## 2019-06-14 PROCEDURE — 83735 ASSAY OF MAGNESIUM: CPT

## 2019-06-14 PROCEDURE — 80048 BASIC METABOLIC PNL TOTAL CA: CPT

## 2019-06-14 PROCEDURE — 85025 COMPLETE CBC W/AUTO DIFF WBC: CPT

## 2019-06-14 PROCEDURE — 6360000002 HC RX W HCPCS: Performed by: INTERNAL MEDICINE

## 2019-06-14 PROCEDURE — 6370000000 HC RX 637 (ALT 250 FOR IP): Performed by: INTERNAL MEDICINE

## 2019-06-14 PROCEDURE — 97535 SELF CARE MNGMENT TRAINING: CPT

## 2019-06-14 RX ORDER — OXYCODONE AND ACETAMINOPHEN 10; 325 MG/1; MG/1
1 TABLET ORAL EVERY 6 HOURS PRN
Qty: 12 TABLET | Refills: 0 | Status: SHIPPED | OUTPATIENT
Start: 2019-06-14 | End: 2019-06-17

## 2019-06-14 RX ORDER — LORAZEPAM 0.5 MG/1
0.25 TABLET ORAL EVERY 8 HOURS PRN
Qty: 15 TABLET | Refills: 0 | Status: SHIPPED | OUTPATIENT
Start: 2019-06-14 | End: 2019-06-19

## 2019-06-14 RX ADMIN — ZONISAMIDE 100 MG: 100 CAPSULE ORAL at 09:41

## 2019-06-14 RX ADMIN — Medication 10 ML: at 09:41

## 2019-06-14 RX ADMIN — PIPERACILLIN SODIUM,TAZOBACTAM SODIUM 3.38 G: 3; .375 INJECTION, POWDER, FOR SOLUTION INTRAVENOUS at 06:33

## 2019-06-14 RX ADMIN — METOPROLOL SUCCINATE 25 MG: 25 TABLET, EXTENDED RELEASE ORAL at 09:41

## 2019-06-14 RX ADMIN — OXYCODONE HYDROCHLORIDE AND ACETAMINOPHEN 1 TABLET: 10; 325 TABLET ORAL at 05:50

## 2019-06-14 RX ADMIN — DOCUSATE SODIUM 100 MG: 100 CAPSULE, LIQUID FILLED ORAL at 09:41

## 2019-06-14 RX ADMIN — DULOXETINE HYDROCHLORIDE 30 MG: 30 CAPSULE, DELAYED RELEASE ORAL at 09:41

## 2019-06-14 RX ADMIN — FUROSEMIDE 40 MG: 40 TABLET ORAL at 09:41

## 2019-06-14 RX ADMIN — PHENYTOIN SODIUM 200 MG: 100 CAPSULE ORAL at 09:41

## 2019-06-14 RX ADMIN — PHENYTOIN SODIUM 100 MG: 100 CAPSULE ORAL at 13:11

## 2019-06-14 RX ADMIN — DIVALPROEX SODIUM 125 MG: 125 CAPSULE, COATED PELLETS ORAL at 09:41

## 2019-06-14 ASSESSMENT — PAIN DESCRIPTION - LOCATION: LOCATION: LEG

## 2019-06-14 ASSESSMENT — PAIN SCALES - GENERAL
PAINLEVEL_OUTOF10: 10
PAINLEVEL_OUTOF10: 7
PAINLEVEL_OUTOF10: 5

## 2019-06-14 ASSESSMENT — PAIN DESCRIPTION - ORIENTATION: ORIENTATION: RIGHT

## 2019-06-14 ASSESSMENT — PAIN DESCRIPTION - PAIN TYPE: TYPE: SURGICAL PAIN

## 2019-06-14 NOTE — PROGRESS NOTES
Body Wt: 216 lb (98 kg)(6/14 Bedscale; 195 lbs. Bedscale 6/8)  · Admission Body Wt: 200 lb (90.7 kg)(6/7 Stated)  · Usual Body Wt: 206 lb (93.4 kg)(Stated per pt; 205 lb. 11/9/18 @[facility])  · % Weight Change:  ,  None per pt's stated usual weight  · Ideal Body Wt: 106 lb 7.7 oz (48.3 kg), % Ideal Body >100%  · Adjusted Body Wt:  , body weight adjusted for BKA  · BMI Classification: BMI 35.0 - 39.9 Obese Class II(38.6 with BKA adjustment)    Nutrition Interventions:   Modify current diet, Continue current ONS(Modify diet to Carb Control 3; Continue Wound healing ONS BID)  Continued Inpatient Monitoring, Education Not Indicated    Nutrition Evaluation:   · Evaluation: Progressing toward goals   · Goals: PO intake >75% meals/ONS. Improved wound status.     · Monitoring: Meal Intake, Supplement Intake, Weight, Pertinent Labs, Wound Healing      Electronically signed by Kevin Astudillo RD, LD on 6/14/19 at 11:03 AM

## 2019-06-14 NOTE — FLOWSHEET NOTE
Pt left floor via cart with [employer].  Electronically signed by Kelly Jang RN on 6/14/2019 at 2:07 PM

## 2019-06-14 NOTE — PROGRESS NOTES
PODIATRIC MEDICINE AND SURGERY  CONSULT PROGRESS NOTE    Consulting Service:  Vascular Surgery  Requesting Provider: Isabela Maldonado  Follow up regarding: R BKA stump possible wound vac  Staff Doctor:  Radha    ASSESSMENT:  60 y.o. female with PMH significant for PAD, DM, CAD. She presented with a necrotic and infection ulceration to her right BKA stump. She is now s/p RLE revascularization and R BKA stump wound debridement by Dr. Isabela Maldonado. Podiatry was consulted for evaluation and treatment of right BKA stump wound. Clinically, there is a large and deep full thickness ulceration to the medial aspect of the right BKA stump. Currently treating with wound vac without issues. PLAN AND RECOMMENDATIONS:  Discussed with staff, Dr. Andrez Trujillo switched to WTD today for transport to SNF  Vac should be reapplied and continued at SNF at 100 mmHg of continuous pressure  NWB to RLE  Elevate RLE at or above heart level while resting  Antibiotic coverage per ID   Pain management per primary team   Will need to follow up with Dr. Rox Ponce in the [address] [place] [address] Road within 10 days of discharge. INTERVAL HISTORY: Afebrile. Vitals stable. On 2 L NC. No acute events overnight. Admits to pain at the wound site. Patient denies nausea, vomiting, diarrhea, fevers, or chills. No other pedal complaints. Pertinent history: 60y.o. year old female was seen today for at the request of vascular surgery for evaluation of R BKA stump wound. Patient states that she had a R BKA 3 years ago. She presented with a necrotic and infection ulceration to her right BKA stump. She is now s/p RLE revascularization and R BKA stump wound debridement. No current facility-administered medications on file prior to encounter.       Current Outpatient Medications on File Prior to Encounter   Medication Sig Dispense Refill    divalproex (DEPAKOTE SPRINKLE) 125 MG capsule Take 125 mg by mouth 3 times daily      furosemide (LASIX) 10 MG/ML injection Infuse 40 mg intravenously daily      LORazepam (ATIVAN) 1 MG tablet Take 1 mg by mouth every 4 hours as needed (Seizures).  oxyCODONE (ROXICODONE) 5 MG immediate release tablet Take 5 mg by mouth every 8 hours as needed for Pain.       potassium chloride (MICRO-K) 10 MEQ extended release capsule Take 10 mEq by mouth daily      ipratropium-albuterol (DUONEB) 0.5-2.5 (3) MG/3ML SOLN nebulizer solution Inhale 1 vial into the lungs every 8 hours as needed for Shortness of Breath (COPD)      divalproex (DEPAKOTE SPRINKLE) 125 MG capsule Take 125 mg by mouth 2 times daily      [START ON 6/19/2019] divalproex (DEPAKOTE SPRINKLE) 125 MG capsule Take 125 mg by mouth daily      DULoxetine (CYMBALTA) 30 MG extended release capsule Take 1 capsule by mouth daily 30 capsule 3    phenytoin (DILANTIN) 100 MG ER capsule Take 200 mg by mouth 2 times daily MORNING AND AT BEDTIME      traZODone (DESYREL) 50 MG tablet Take 50 mg by mouth nightly      docusate sodium (COLACE) 100 MG capsule Take 100 mg by mouth every morning      aspirin 81 MG chewable tablet Take 81 mg by mouth daily      atorvastatin (LIPITOR) 10 MG tablet Take 10 mg by mouth nightly       vitamin D 1000 UNITS CAPS Take 1 capsule by mouth daily       isosorbide mononitrate (IMDUR) 30 MG extended release tablet Take 30 mg by mouth daily      furosemide (LASIX) 20 MG tablet Take 40 mg by mouth daily       magnesium hydroxide (MILK OF MAGNESIA) 400 MG/5ML suspension Take 30 mLs by mouth daily as needed for Constipation      phenytoin (DILANTIN) 100 MG ER capsule Take 100 mg by mouth Daily with lunch       metoprolol succinate (TOPROL XL) 25 MG extended release tablet Take 25 mg by mouth daily      zonisamide (ZONEGRAN) 100 MG capsule Take 100 mg by mouth daily       Review of Systems  See interval history    OBJECTIVE:  BP (!) 148/68   Pulse 87   Temp 98.4 °F (36.9 °C) (Oral)   Resp 16   Ht 5' 2\" (1.575 m)   Wt 216 lb (98 kg)   SpO2 97%   BMI 39.51 kg/m² Patient is alert and oriented to person, place, and time    Vascular:   Nonpalpable Dorsalis Pedis and nonpalpable Posterior Tibial Pulses of the left foot with audible signal  Audible popliteal pulse of RLE    Neurological:   Sensation to light touch intact B/L    Musculoskeletal/Orthopaedic:   Structural Deformities: R BKA  Gross motor function intact to BLE    Dermatological:   No open lesions to left foot  No signs of vac leaks or failure    Ulceration #1:   Location: Medial R BKA stump  Base: Fibrogranular  Measurements: 6 cm x 4.5 cm x 2.5 cm  Borders: viable  Exudate: mild sanguinous drainage   Comments: minimal periwound erythema and edema without evidence of cellulitis or ascending lymphangitis. No exposed tendon or bone.     LABS:   Lab Results   Component Value Date    WBC 7.1 06/14/2019    HGB 11.0 (L) 06/14/2019    HCT 31.9 (L) 06/14/2019    .2 (H) 06/14/2019     06/14/2019     Lab Results   Component Value Date     06/14/2019    K 3.3 06/14/2019     06/14/2019    CO2 25 06/14/2019    BUN 8 06/14/2019    CREATININE 1.18 06/14/2019    GLUCOSE 102 06/14/2019    GLUCOSE 94 12/28/2011    CALCIUM 8.6 06/14/2019      Lab Results   Component Value Date    LABALBU 3.8 06/07/2019           Carola Lake DPM PGY-2  Podiatric Surgery Resident  Podiatry On Call Pager: [phone]  June 14, 2019   11:36 AM

## 2019-06-14 NOTE — PROGRESS NOTES
Physical Therapy Med Surg Daily Treatment Note  [facility]  [facility]: [facility]       NAME: Forrest Garsia  : 1960 (2 y.o.)  MRN: [id number]  CODE STATUS: Full Code    Date of Service: 2019    Patient Diagnosis(es): Wound infection [T14. Alejandro Iniguez, L08.9]   Chief Complaint   Patient presents with    Wound Infection     right BKA      Patient Active Problem List    Diagnosis Date Noted    Wound infection 2019    Dementia with aggressive behavior 01/15/2019    Lethargy 2016    Ischemia of lower extremity 2013    Cerebrovascular accident, old 2013    Angiopathy, peripheral ([facility] [facility] 2013    Carotid artery disease ([facility] [address] [facility] 2013    History of amputation of lower extremity through tibia and fibula ([facility] [address] [facility] 10/14/2013    Deep vein thrombosis (DVT) of lower extremity ([facility] [address] 2013    Noncompliance with treatment 10/14/2009        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     Athscl heart disease of native coronary artery w/o ang pctrs     CAD (coronary artery disease)     Cancer (HCC)     right BKA    Constipation     Constipation     Depression     MAJOR DEPRESSIVE DISORDER    Diabetes mellitus (HCC)     TYPE II    Diabetic neuropathy (HCC)     Hyperlipidemia     Hypertension     Insomnia     Muscle weakness     PVD (peripheral vascular disease) (HCC)     Seizures ([address]     Vascular dementia     Vitamin D deficiency     Weakness      Past Surgical History:   Procedure Laterality Date    ARTERIOGRAM Right 2019    RIGHT ARM AORTO FEMORAL DIGITAL SUBTRACTION ARTERIOGRAM performed by Franchesca Kothari MD at [facility] Right 2019    REVISION AND REVASCULARIZATION RIGHT LOWER EXTREMITY performed by Francehsca Kothari MD at [address] [place] [address] Right Restrictions:  Restrictions/Precautions: Fall Risk(wound on distal R LE residual limb)    SUBJECTIVE:  General  Chart Reviewed: Yes  Family / Caregiver Present: No  Subjective  Subjective: Pt with aphasia able to intermittently verbalize appropriately. Pre Pain Assessment:  Pre Treatment Pain Screening  Pain at present: 10  Scale Used: Numeric Score  Intervention List: Patient able to continue with treatment  Pain Screening  Patient Currently in Pain: Yes       Post Pain Assessment:   Pain Assessment  Pain Assessment: 0-10  Pain Level: 10  Pain Type: Surgical pain  Pain Location: Leg  Pain Orientation: Right       OBJECTIVE:         Bed mobility  Supine to Sit: Minimal assistance  Sit to Supine: Minimal assistance  Comment: increased assistance to bring trunk upright. Transfers  Sit to Stand: Minimal Assistance  Stand to sit: Contact guard assistance  Comment: @WW pt moaning in pain throughout, able to maintain standing on LLE for ~60 seconds before needing to sit and requesting to end treatment. Neuromuscular Education  Neuromuscular Comments: attempted seated reaching activity pt becomes agitated and grabs my hand violently. ASSESSMENT:  Body structures, Functions, Activity limitations: Decreased strength; Increased Pain;Decreased functional mobility     Assessment: pt aphasic and moaning in pain throughout tx, slightly improved standing activity tolerance. Activity Tolerance  Activity Tolerance: Patient limited by pain; Patient limited by cognitive status       Discharge Recommendations:  Continue to assess pending progress, Patient would benefit from continued therapy after discharge    Goals:  Long term goals  Long term goal 1: mod I with bed mobilty   Long term goal 2: functional transfers with mod I  Long term goal 3: seated reaching with no LOB outside of DANITA    PLAN:   Plan  Times per week: 3-6  Plan weeks: course of admission  Specific instructions for Next Treatment: transfer training, seated balance, TE as tolerated  Current Treatment Recommendations: Strengthening, Functional Mobility Training, Transfer Training, Home Exercise Program, Equipment Evaluation, Education, & procurement, Safety Education & Training, Positioning, Patient/Caregiver Education & Training, Balance Training, Neuromuscular Re-education  Safety Devices  Type of devices: Bed alarm in place, All fall risk precautions in place, Call light within reach, Left in bed     [facility] (6 CLICK) Jules Harper 28 Inpatient Mobility Raw Score : 13      Therapy Time   Individual   Time In 0916   Time Out 0930   Minutes 14      BM/Trsf: Geo 97, PTA, 06/14/19 at 9:35 AM

## 2019-06-14 NOTE — DISCHARGE SUMMARY
Right lower extremity stump covered with dressing clean dry and intact. .  Full range of motion without deformity. Skin: Skin color, texture, turgor normal.  No rashes or lesions. Neuro: Non Focal. Symetrical motor and tone. Nl Comprehension, Alert,awake and oriented. NL CN. Symetrical tone and reflexes. Psychiatric: Alert and oriented, thought content appropriate, normal insight  Capillary Refill: Brisk,< 3 seconds   Peripheral Pulses: +2 palpable, equal bilaterally     Patient was seen by the following consultants while admitted to [facility]:   Consults:  [address]  IP CONSULT TO SPIRITUAL SERVICES  IP CONSULT TO DIETITIAN  IP CONSULT TO PODIATRY  PHARMACY TO DOSE VANCOMYCIN  PHARMACY TO DOSE VANCOMYCIN    Significant Diagnostic Studies:    Us Dup Lower Extremities Bilateral Venous    Result Date: 2019  US DUP LOWER EXTREMITIES BILATERAL VENOUS CLINICAL HISTORY:  cellulitis COMPARISONS: FINDINGS: Duplex color ultrasound as well as  both gray scale and spectral Doppler ultrasound of the deep venous system of both the left land right lower extremity from the inguinal ligaments to the popliteal fossa was performed. There are no findings of deep venous thrombus in the visualized vessels of the left or right  lower extremity. NO FINDINGS OF  DEEP VENOUS THROMBUS IN THE VISUALIZED VESSELS OF THE LEFT OR RIGHT  LOWER EXTREMITY.       Discharge Medications:       Shirley Burden   Home Medication Instructions     Printed on:19 1000   Medication Information                      aspirin 81 MG chewable tablet  Take 81 mg by mouth daily             atorvastatin (LIPITOR) 10 MG tablet  Take 10 mg by mouth nightly              divalproex (DEPAKOTE SPRINKLE) 125 MG capsule  Take 125 mg by mouth 3 times daily             docusate sodium (COLACE) 100 MG capsule  Take 100 mg by mouth every morning             DULoxetine (CYMBALTA) 30 MG extended release capsule  Take 1 capsule by mouth daily             furosemide (LASIX) 20 MG tablet  Take 40 mg by mouth daily              ipratropium-albuterol (DUONEB) 0.5-2.5 (3) MG/3ML SOLN nebulizer solution  Inhale 1 vial into the lungs every 8 hours as needed for Shortness of Breath (COPD)             isosorbide mononitrate (IMDUR) 30 MG extended release tablet  Take 30 mg by mouth daily             LORazepam (ATIVAN) 0.5 MG tablet  Take 0.5 tablets by mouth every 8 hours as needed for Anxiety for up to 5 days. magnesium hydroxide (MILK OF MAGNESIA) 400 MG/5ML suspension  Take 30 mLs by mouth daily as needed for Constipation             metoprolol succinate (TOPROL XL) 25 MG extended release tablet  Take 25 mg by mouth daily             oxyCODONE-acetaminophen (PERCOCET)  MG per tablet  Take 1 tablet by mouth every 6 hours as needed for Pain for up to 3 days. phenytoin (DILANTIN) 100 MG ER capsule  Take 100 mg by mouth Daily with lunch              phenytoin (DILANTIN) 100 MG ER capsule  Take 200 mg by mouth 2 times daily MORNING AND AT BEDTIME             piperacillin-tazobactam (ZOSYN) infusion  Infuse 3.375 g intravenously every 8 hours for 10 days Compound per protocol. potassium chloride (MICRO-K) 10 MEQ extended release capsule  Take 10 mEq by mouth daily             traZODone (DESYREL) 50 MG tablet  Take 50 mg by mouth nightly             vancomycin (VANCOCIN) infusion  Infuse 1,250 mg intravenously every 24 hours for 10 days Compound per protocol. vitamin D 1000 UNITS CAPS  Take 1 capsule by mouth daily              zonisamide (ZONEGRAN) 100 MG capsule  Take 100 mg by mouth daily                 Disposition:   If discharged to Home, Any [place] [address] needs that were indicated and/or required as been addressed and set up by Social Work. Condition at discharge: Pt was medically stable at the time of discharge.     Activity: activity as tolerated    Total time taken for discharging this patient: 40 minutes. Greater than 70% of time was spent focused exclusively on this patient. Time was taken to review chart, discuss plans with consultants, reconciling medications, discussing plan answering questions with patient.      Konrad Purcell  6/14/2019, 10:00 AM  ----------------------------------------------------------------------------------------------------------------------    Kurtis Melendrez

## 2019-06-17 NOTE — PROGRESS NOTES
Physical Therapy  Facility/Department: [facility] MED SURG K901/[id number]  Physical Therapy Discharge      NAME: Edy Reid    : 1960 (70 y.o.)  MRN: [id number]    Account: [de-identified]  Gender: female      Patient has been discharged from acute care hospital. DC patient from current PT program.      Electronically signed by Yunior Ward PT on 19 at 11:03 AM

## 2019-06-18 LAB — VANCOMYCIN TROUGH: 20.6 UG/ML (ref 10–20)

## 2019-06-28 ENCOUNTER — HOSPITAL ENCOUNTER (OUTPATIENT)
Dept: WOUND CARE | Age: 59
Discharge: HOME | End: 2019-06-28
Payer: MEDICAID

## 2019-06-28 ENCOUNTER — OFFICE VISIT (OUTPATIENT)
Dept: INFECTIOUS DISEASES | Age: 59
End: 2019-06-28
Payer: MEDICAID

## 2019-06-28 VITALS
TEMPERATURE: 98.5 F | RESPIRATION RATE: 16 BRPM | BODY MASS INDEX: 37.66 KG/M2 | HEIGHT: 64 IN | HEART RATE: 78 BPM | SYSTOLIC BLOOD PRESSURE: 130 MMHG | DIASTOLIC BLOOD PRESSURE: 78 MMHG

## 2019-06-28 DIAGNOSIS — I73.9 ANGIOPATHY, PERIPHERAL (HCC): ICD-10-CM

## 2019-06-28 DIAGNOSIS — T87.89 DELAYED SURGICAL WOUND HEALING OF BELOW-THE-KNEE AMPUTATION STUMP (HCC): Chronic | ICD-10-CM

## 2019-06-28 DIAGNOSIS — T87.89 DELAYED SURGICAL WOUND HEALING OF BELOW-THE-KNEE AMPUTATION STUMP (HCC): Primary | ICD-10-CM

## 2019-06-28 DIAGNOSIS — T81.89XA DELAYED SURGICAL WOUND HEALING OF BELOW-THE-KNEE AMPUTATION STUMP (HCC): Chronic | ICD-10-CM

## 2019-06-28 DIAGNOSIS — L08.9 WOUND INFECTION: ICD-10-CM

## 2019-06-28 DIAGNOSIS — L97.205: Chronic | ICD-10-CM

## 2019-06-28 DIAGNOSIS — T14.8XXA WOUND INFECTION: ICD-10-CM

## 2019-06-28 DIAGNOSIS — T81.89XA DELAYED SURGICAL WOUND HEALING OF BELOW-THE-KNEE AMPUTATION STUMP (HCC): Primary | ICD-10-CM

## 2019-06-28 PROCEDURE — 99213 OFFICE O/P EST LOW 20 MIN: CPT

## 2019-06-28 PROCEDURE — 99214 OFFICE O/P EST MOD 30 MIN: CPT | Performed by: INTERNAL MEDICINE

## 2019-06-28 RX ORDER — ASCORBIC ACID 500 MG
500 TABLET ORAL DAILY
COMMUNITY

## 2019-06-28 RX ORDER — OXYCODONE HYDROCHLORIDE AND ACETAMINOPHEN 5; 325 MG/1; MG/1
1 TABLET ORAL EVERY 8 HOURS PRN
Status: ON HOLD | COMMUNITY
End: 2019-08-16 | Stop reason: SDUPTHER

## 2019-06-28 RX ORDER — SELENIUM 50 MCG
1 TABLET ORAL DAILY
COMMUNITY

## 2019-06-28 NOTE — DISCHARGE INSTR - COC
Continuity of Care Form    Patient Name: Anabella Hilario   :  1960  MRN:  [id number]    Admit date:  2019  Discharge date:  ***    Code Status Order: Prior   Advance Directives:   [facility] Documentation     Date/Time Healthcare Directive Type of Healthcare Directive Copy in [facility] [address] Agent's Name Healthcare Agent's Phone Number    [phone]  Yes, patient has an advance directive for healthcare treatment  Durable power of  for health care  Yes, copy in chart  Healthcare power of   Xiomara Arriaza --          Admitting Physician:  No admitting provider for patient encounter. PCP: Juan Carlos De La Cruz MD    Discharging Nurse: [facility] Unit/Room#: No information available for this encounter.   Discharging Unit Phone Number: ***    Emergency Contact:   Extended Emergency Contact Information  Primary Emergency Contact: [address] [place] [address] Phone: [phone]  Mobile Phone: [phone]  Relation: Other  Secondary Emergency Contact: [address] Phone: [phone]  Mobile Phone: [phone]  Relation: Other    Past Surgical History:  Past Surgical History:   Procedure Laterality Date    ARTERIOGRAM Right 2019    RIGHT ARM AORTO FEMORAL DIGITAL SUBTRACTION ARTERIOGRAM performed by Ivette Quintero MD at [address] [place] [address] Right 2019    REVISION AND REVASCULARIZATION RIGHT LOWER EXTREMITY performed by Ivette Quintero MD at [address] [place] [address] Right        Immunization History:   Immunization History   Administered Date(s) Administered    Influenza Vaccine, unspecified formulation 10/30/2016    Pneumococcal Polysaccharide (Aajdzdexx53) 10/30/2016       Active Problems:  Patient Active Problem List   Diagnosis Code    Lethargy R53.83    Ischemia of lower extremity I99.8    Cerebrovascular accident, old Z80.78    Noncompliance with treatment Z91.19    Deep vein thrombosis (DVT) of lower extremity ([facility]) I82.409    Angiopathy, peripheral ([facility]) I73.9    History of amputation of lower extremity through tibia and fibula ([facility]) Z89.519    Carotid artery disease ([facility]) I77.9    Dementia with aggressive behavior F03.91    Wound infection T14. 8XXA, L08.9       Isolation/Infection:   Isolation          No Isolation            Nurse Assessment:  Last Vital Signs: There were no vitals taken for this visit. Last documented pain score (0-10 scale):    Last Weight:   Wt Readings from Last 1 Encounters:   06/14/19 216 lb (98 kg)     Mental Status:  {IP PT MENTAL STATUS:95330}    IV Access:  { JARRED IV ACCESS:246214295}    Nursing Mobility/ADLs:  Walking   {P DME ZJSA:921667439}  Transfer  {P DME WUCJ:157380782}  Bathing  {P DME BEQJ:367727991}  Dressing  {P DME CGFZ:045198702}  Toileting  {P DME CYFB:323471613}  Feeding  {[facility] DME KVRP:467663287}  Med Admin  {[facility] DME BFBN:083697252}  Med Delivery   { JARRED MED Delivery:631302088}    Wound Care Documentation and Therapy:  Wound 06/07/19 Leg Medial;Right #1 right BKA (Active)   Wound Image   6/28/2019  9:02 AM   Wound Arterial 6/28/2019  9:02 AM   Dressing Status Clean;Dry; Intact 6/12/2019  4:00 AM   Dressing Changed Changed/New 6/12/2019  4:00 AM   Dressing/Treatment Open to air 6/11/2019  8:19 AM   Wound Cleansed Rinsed/Irrigated with saline 6/28/2019  9:02 AM   Wound Length (cm) 6.8 cm 6/28/2019  9:02 AM   Wound Width (cm) 5 cm 6/28/2019  9:02 AM   Wound Depth (cm) 2.4 cm 6/28/2019  9:02 AM   Wound Surface Area (cm^2) 34 cm^2 6/28/2019  9:02 AM   Change in Wound Size % (l*w) -41.67 6/28/2019  9:02 AM   Wound Volume (cm^3) 81.6 cm^3 6/28/2019  9:02 AM   Wound Healing % -240 6/28/2019  9:02 AM   Wound Assessment Black 6/11/2019  8:19 AM   Drainage Amount Large 6/28/2019  9:02 AM   Drainage Description Yellow 6/28/2019  9:02 AM   Odor Mild 6/28/2019  9:02 AM   Margins Defined edges 6/28/2019  9:02 AM Chani-wound Assessment Dry; Intact 2019  9:02 AM   Non-staged Wound Description Full thickness 2019  9:02 AM   [place]%Wound Bed 25 2019  9:02 AM   Yellow%Wound Bed 75 2019  9:02 AM   Number of days: 20        Elimination:  Continence:   · Bowel: {YES / YZ:93839}  · Bladder: {YES / EX:68323}  Urinary Catheter: {Urinary Catheter:555501584}   Colostomy/Ileostomy/Ileal Conduit: {YES / BL:08795}       Date of Last BM: ***  No intake or output data in the 24 hours ending 19 0906  No intake/output data recorded.     Safety Concerns:     [address] [employer] Safety Concerns:789142810}    Impairments/Disabilities:      [address] [employer] Impairments/Disabilities:016053957}    Nutrition Therapy:  Current Nutrition Therapy:   [address] [employer] Diet List:518458044}    Routes of Feeding: {CHP DME Other Feedings:184834509}  Liquids: {Slp liquid thickness:99967}  Daily Fluid Restriction: {CHP DME Yes amt example:391566808}  Last Modified Barium Swallow with Video (Video Swallowing Test): {Done Not Done [facility] in [facility] [place]:215023139}    Treatments at the Time of Hospital Discharge:   Respiratory Treatments: ***  Oxygen Therapy:  {Therapy; copd oxygen:43085}  Ventilator:    {[facility] Vent ZJUR:659455493}    Rehab Therapies: {THERAPEUTIC INTERVENTION:4809929724}  Weight Bearing Status/Restrictions: [address]  Weight Bearin}  Other Medical Equipment (for information only, NOT a DME order):  {EQUIPMENT:861242984}  Other Treatments: ***    Patient's personal belongings (please select all that are sent with patient):  {CHP DME Belongings:046927243}    RN SIGNATURE:  {Esignature:978116580}    CASE MANAGEMENT/SOCIAL WORK SECTION    Inpatient Status Date: ***    Readmission Risk Assessment Score:  Readmission Risk              Risk of Unplanned Readmission:        0           Discharging to Facility/ Agency   · Name:   · Address:  · Phone:  · Fax:    Dialysis Facility (if applicable)   · Name:  · Address:  · Dialysis Schedule:  · Phone:  · Fax:    Case Manager/ signature: {Esignature:874943154}    PHYSICIAN SECTION    Prognosis: {Prognosis:8419332542}    Condition at Discharge: [address] Patient Condition:456198768}    Rehab Potential (if transferring to Rehab): {Prognosis:3669752388}    Recommended Labs or Other Treatments After Discharge: ***    Physician Certification: I certify the above information and transfer of Toby Olguin  is necessary for the continuing treatment of the diagnosis listed and that she requires {Admit to Appropriate Level of Care:94164} for {GREATER/LESS:154420218} 30 days.      Update Admission H&P: {CHP DME Changes in TKDWW:842166107}    PHYSICIAN SIGNATURE:  {Esignature:610226577}

## 2019-06-28 NOTE — PROGRESS NOTES
Paulina Alcantara 36   Progress Note and Procedure Note      [facility] RECORD NUMBER:  [id number]  AGE: 60 y.o. GENDER: female  : 1960  EPISODE DATE:  2019    Subjective:     Chief Complaint   Patient presents with    Wound Check     right leg         HISTORY of PRESENT ILLNESS HPI     Chris Galan is a 60 y.o. female who presents today for wound/ulcer evaluation. History of Wound Context: Patient presents for follow up of non-healing right stump. She is s/p revasc of the right stump with debridement of necrotic wound. She was seen on consult as inpatient. She was placed on a wound vac and is in a SNF for wound care IV abx.   Wound/Ulcer Pain Timing/Severity: moderate  Quality of pain: sharp, aching  Severity:  2 / 10   Modifying Factors: None  Associated Signs/Symptoms: edema, drainage and pain    Ulcer Identification:  Ulcer Type: arterial and non-healing surgical  Contributing Factors: edema, diabetes, decreased mobility, obesity, arterial insufficiency and decreased tissue oxygenation    Wound: N/A        PAST MEDICAL HISTORY        Diagnosis Date    Altered mental status, unspecified     Aphasia     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     Athscl heart disease of native coronary artery w/o ang pctrs     CAD (coronary artery disease)     Cancer (HCC)     right BKA    Constipation     Constipation     Depression     MAJOR DEPRESSIVE DISORDER    Diabetes mellitus (HCC)     TYPE II    Diabetic neuropathy (HCC)     Hyperlipidemia     Hypertension     Insomnia     Muscle weakness     PVD (peripheral vascular disease) (HCC)     Seizures (HCC)     Vascular dementia     Vitamin D deficiency     Weakness        PAST SURGICAL HISTORY    Past Surgical History:   Procedure Laterality Date    ARTERIOGRAM Right 2019    RIGHT ARM AORTO FEMORAL DIGITAL SUBTRACTION ARTERIOGRAM performed by Aung Richter MD at [address] [place] [address] Right 6/11/2019    REVISION AND REVASCULARIZATION RIGHT LOWER EXTREMITY performed by Dakota Barth MD at [address] [facility] Right        FAMILY HISTORY    History reviewed. No pertinent family history.     SOCIAL HISTORY    Social History     Tobacco Use    Smoking status: Current Every Day Smoker     Packs/day: 0.50     Years: 13.00     Pack years: 6.50     Types: Cigarettes    Smokeless tobacco: Never Used   Substance Use Topics    Alcohol use: No     Comment: in nursing home for 3 years    Drug use: No       ALLERGIES    No Known Allergies    MEDICATIONS    Current Outpatient Medications on File Prior to Encounter   Medication Sig Dispense Refill    divalproex (DEPAKOTE SPRINKLE) 125 MG capsule Take 125 mg by mouth 3 times daily      potassium chloride (MICRO-K) 10 MEQ extended release capsule Take 10 mEq by mouth daily      ipratropium-albuterol (DUONEB) 0.5-2.5 (3) MG/3ML SOLN nebulizer solution Inhale 1 vial into the lungs every 8 hours as needed for Shortness of Breath (COPD)      DULoxetine (CYMBALTA) 30 MG extended release capsule Take 1 capsule by mouth daily 30 capsule 3    phenytoin (DILANTIN) 100 MG ER capsule Take 200 mg by mouth 2 times daily MORNING AND AT BEDTIME      traZODone (DESYREL) 50 MG tablet Take 50 mg by mouth nightly      docusate sodium (COLACE) 100 MG capsule Take 100 mg by mouth every morning      aspirin 81 MG chewable tablet Take 81 mg by mouth daily      atorvastatin (LIPITOR) 10 MG tablet Take 10 mg by mouth nightly       vitamin D 1000 UNITS CAPS Take 1 capsule by mouth daily       isosorbide mononitrate (IMDUR) 30 MG extended release tablet Take 30 mg by mouth daily      furosemide (LASIX) 20 MG tablet Take 40 mg by mouth daily       magnesium hydroxide (MILK OF MAGNESIA) 400 MG/5ML suspension Take 30 mLs by mouth daily as needed for Constipation      phenytoin (DILANTIN) 100 MG ER capsule Take 100 mg by mouth Daily with lunch       metoprolol succinate (TOPROL XL) 25 MG extended release tablet Take 25 mg by mouth daily      zonisamide (ZONEGRAN) 100 MG capsule Take 100 mg by mouth daily       No current facility-administered medications on file prior to encounter. REVIEW OF SYSTEMS    Pertinent items are noted in HPI. Objective: There were no vitals taken for this visit. Wt Readings from Last 3 Encounters:   06/14/19 216 lb (98 kg)   07/27/18 220 lb (99.8 kg)   08/06/17 205 lb (93 kg)       PHYSICAL EXAM    Constitutional:   Well nourished and well developed. Appears neat and clean. Patient is alert, oriented x3, and in no apparent distress. Respiratory:  Respiratory effort is easy and symmetric bilaterally. Rate is normal at rest and on room air. Vascular:  Pedal Pulses is not palpable and audible signal noted with doppler. Capillary refill is <5 sec to digits bilateral.  Extremities negative for pitting edema. Neurological:  Gross and Light touch intact. Protective sensation intact. Dermatological:  Wound description noted in wound assessment. The debrided wound area has only about 10% granulation with sub Q fat. There is no sign of infection noted. Psychiatric:  Judgement and insight intact. Short and long term memory intact. No evidence of depression, anxiety, or agitation. Patient is calm, cooperative, and communicative. Appropriate interactions and affect. 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     Wound 06/07/19 Leg Medial;Right #1 right BKA (Active)   Wound Image   6/28/2019  9:02 AM   Wound Arterial 6/28/2019 6/14/2019  9:42 AM   Dressing Status Clean;Dry; Intact 6/14/2019  9:42 AM   Number of days: 16             Plan:   Continue wound vac now with santyl one hour before application  Continue to see Dr. Huey Claire for follow up of vascular status. Treatment Note please see attached Discharge Instructions    Written patient dismissal instructions given to patient and signed by patient or POA. Discharge Instructions       [facility]    Physician Orders and Discharge Instructions  [address]  Telephone: [phone]      NAME:  Estrella Guzman  YOB: 1960  MEDICAL RECORD NUMBER:  [id number]    Home Care/Facility:  @[facility]    Wound Location:  Right BKA    Dressing orders: 1. Cleanse wound(s) with normal saline. 2. Apply a nickel thickness layer of SANTYL OINTMENT to the wound bed for         enzymatic debridement purposes. 3. Apply a moistened saline 4x4 (gauze pad) over the Santyl Ointment. 4. Cover with additional dry gauze. Apply 1 hour before wound vac is applied. Then apply vac as below:  Remove gauze first.    1. Cleanse wound(s) with Normal Saline. 2.  Picture frame wound with vac drape  3. Apply Black sponge per wound vac technique. 4. Bridge to a non-pressure area if necessary. 5. Settings: 100 mm Hg CONTINUOUS  6. Change Wound Vac 3 times a week. Today only, Saline wet to moist gauze. Compression:    Offloading Device:    Other Instructions:     Keep all dressings clean, dry and intact. Keep pressure off the wound(s) at all times. Do not get wound wet in shower or bath. May use cast cover. Follow up visit   2 Weeks  July 12, 2019 at    Please give 24 hour notice if unable to keep appointment. [phone]    If you experience any of the following, please call the Wound Care Service at  [phone] or go to the nearest emergency room.    *Increase in pain *Temperature over 101 *Increase in drainage from your wound or a foul odor  *Uncontrolled swelling *Need for compression bandage changes due to slippage, breakthrough drainage       PLEASE NOTE: IF YOU ARE UNABLE TO OBTAIN WOUND SUPPLIES, CONTINUE TO USE THE SUPPLIES YOU HAVE AVAILABLE UNTIL YOU ARE ABLE TO REACH US.  IT IS MOST IMPORTANT TO KEEP THE WOUND COVERED AT ALL TIMES  Electronically signed by Deacon Everett DPM on 6/28/2019 at 9:17 AM                Electronically signed by Deacon Everett DPM on 6/28/2019 at 9:19 AM

## 2019-07-09 ENCOUNTER — TELEPHONE (OUTPATIENT)
Dept: WOUND CARE | Age: 59
End: 2019-07-09

## 2019-07-09 NOTE — TELEPHONE ENCOUNTER
Message received from Marissa, wound nurse at [facility] who states patient keeps pulling off wound vac, refusing wound vac, Dr. Kendy Woodard made aware and orders Santyl with saline moist gauze and dry dressing to be applied daily, this RN spoke with Marissa via telephone and informed her of new order, patient has wound center visit 7/12/19 at 0900.

## 2019-07-12 ENCOUNTER — HOSPITAL ENCOUNTER (OUTPATIENT)
Dept: WOUND CARE | Age: 59
Discharge: HOME | End: 2019-07-12
Payer: MEDICAID

## 2019-07-12 ENCOUNTER — HOSPITAL ENCOUNTER (EMERGENCY)
Age: 59
Discharge: NURSING, REHAB OR LONG-TERM CARE | End: 2019-07-12
Attending: EMERGENCY MEDICINE
Payer: MEDICAID

## 2019-07-12 VITALS
TEMPERATURE: 97.9 F | HEART RATE: 80 BPM | WEIGHT: 216 LBS | BODY MASS INDEX: 38.27 KG/M2 | OXYGEN SATURATION: 99 % | HEIGHT: 63 IN | DIASTOLIC BLOOD PRESSURE: 82 MMHG | RESPIRATION RATE: 20 BRPM | SYSTOLIC BLOOD PRESSURE: 137 MMHG

## 2019-07-12 VITALS
TEMPERATURE: 96.3 F | RESPIRATION RATE: 18 BRPM | HEIGHT: 64 IN | DIASTOLIC BLOOD PRESSURE: 69 MMHG | SYSTOLIC BLOOD PRESSURE: 148 MMHG | HEART RATE: 82 BPM | BODY MASS INDEX: 37.66 KG/M2

## 2019-07-12 DIAGNOSIS — T87.9 BKA STUMP COMPLICATION (HCC): Primary | ICD-10-CM

## 2019-07-12 LAB
ALBUMIN SERPL-MCNC: 4.3 G/DL (ref 3.5–4.6)
ALP BLD-CCNC: 168 U/L (ref 40–130)
ALT SERPL-CCNC: 9 U/L (ref 0–33)
ANION GAP SERPL CALCULATED.3IONS-SCNC: 12 MEQ/L (ref 9–15)
AST SERPL-CCNC: 12 U/L (ref 0–35)
BASOPHILS ABSOLUTE: 0.1 K/UL (ref 0–0.2)
BASOPHILS RELATIVE PERCENT: 0.8 %
BILIRUB SERPL-MCNC: <0.2 MG/DL (ref 0.2–0.7)
BUN BLDV-MCNC: 11 MG/DL (ref 6–20)
CALCIUM SERPL-MCNC: 9.3 MG/DL (ref 8.5–9.9)
CHLORIDE BLD-SCNC: 98 MEQ/L (ref 95–107)
CO2: 25 MEQ/L (ref 20–31)
CREAT SERPL-MCNC: 0.65 MG/DL (ref 0.5–0.9)
EOSINOPHILS ABSOLUTE: 0.3 K/UL (ref 0–0.7)
EOSINOPHILS RELATIVE PERCENT: 3.3 %
GFR AFRICAN AMERICAN: >60
GFR NON-AFRICAN AMERICAN: >60
GLOBULIN: 3.8 G/DL (ref 2.3–3.5)
GLUCOSE BLD-MCNC: 106 MG/DL (ref 70–99)
HCT VFR BLD CALC: 36.5 % (ref 37–47)
HEMOGLOBIN: 12.4 G/DL (ref 12–16)
LACTIC ACID: 0.9 MMOL/L (ref 0.5–2.2)
LYMPHOCYTES ABSOLUTE: 1.4 K/UL (ref 1–4.8)
LYMPHOCYTES RELATIVE PERCENT: 16.4 %
MCH RBC QN AUTO: 33.6 PG (ref 27–31.3)
MCHC RBC AUTO-ENTMCNC: 34.1 % (ref 33–37)
MCV RBC AUTO: 98.7 FL (ref 82–100)
MONOCYTES ABSOLUTE: 0.7 K/UL (ref 0.2–0.8)
MONOCYTES RELATIVE PERCENT: 8.1 %
NEUTROPHILS ABSOLUTE: 6 K/UL (ref 1.4–6.5)
NEUTROPHILS RELATIVE PERCENT: 71.4 %
PDW BLD-RTO: 13.4 % (ref 11.5–14.5)
PLATELET # BLD: 232 K/UL (ref 130–400)
POTASSIUM SERPL-SCNC: 3.9 MEQ/L (ref 3.4–4.9)
RBC # BLD: 3.7 M/UL (ref 4.2–5.4)
SODIUM BLD-SCNC: 135 MEQ/L (ref 135–144)
TOTAL PROTEIN: 8.1 G/DL (ref 6.3–8)
WBC # BLD: 8.4 K/UL (ref 4.8–10.8)

## 2019-07-12 PROCEDURE — 6360000002 HC RX W HCPCS: Performed by: EMERGENCY MEDICINE

## 2019-07-12 PROCEDURE — 96374 THER/PROPH/DIAG INJ IV PUSH: CPT

## 2019-07-12 PROCEDURE — 83605 ASSAY OF LACTIC ACID: CPT

## 2019-07-12 PROCEDURE — 96375 TX/PRO/DX INJ NEW DRUG ADDON: CPT

## 2019-07-12 PROCEDURE — 85025 COMPLETE CBC W/AUTO DIFF WBC: CPT

## 2019-07-12 PROCEDURE — 99284 EMERGENCY DEPT VISIT MOD MDM: CPT

## 2019-07-12 PROCEDURE — 99213 OFFICE O/P EST LOW 20 MIN: CPT

## 2019-07-12 PROCEDURE — 87040 BLOOD CULTURE FOR BACTERIA: CPT

## 2019-07-12 PROCEDURE — 80053 COMPREHEN METABOLIC PANEL: CPT

## 2019-07-12 PROCEDURE — 36415 COLL VENOUS BLD VENIPUNCTURE: CPT

## 2019-07-12 RX ORDER — ONDANSETRON 2 MG/ML
4 INJECTION INTRAMUSCULAR; INTRAVENOUS ONCE
Status: COMPLETED | OUTPATIENT
Start: 2019-07-12 | End: 2019-07-12

## 2019-07-12 RX ADMIN — ONDANSETRON 4 MG: 2 INJECTION INTRAMUSCULAR; INTRAVENOUS at 12:56

## 2019-07-12 RX ADMIN — HYDROMORPHONE HYDROCHLORIDE 1 MG: 1 INJECTION, SOLUTION INTRAMUSCULAR; INTRAVENOUS; SUBCUTANEOUS at 12:56

## 2019-07-12 ASSESSMENT — PAIN DESCRIPTION - LOCATION
LOCATION: LEG
LOCATION: LEG

## 2019-07-12 ASSESSMENT — PAIN SCALES - GENERAL
PAINLEVEL_OUTOF10: 10

## 2019-07-12 ASSESSMENT — ENCOUNTER SYMPTOMS
SHORTNESS OF BREATH: 0
ABDOMINAL PAIN: 0
VOMITING: 0
SORE THROAT: 0
CHEST TIGHTNESS: 0
NAUSEA: 0
EYE PAIN: 0

## 2019-07-12 ASSESSMENT — PAIN DESCRIPTION - ORIENTATION
ORIENTATION: RIGHT
ORIENTATION: RIGHT

## 2019-07-12 NOTE — ED NOTES
This RN at bedside to assess pt. Pt A/Ox4, skin p/w/d, resp even and unlabored, msp's intact. Pt has open wound on right bka site.      Cristel Reddy RN  07/12/19 [id number]

## 2019-07-12 NOTE — PROGRESS NOTES
ARTERIOGRAM performed by Chet Maldonado MD at [facility] Right 6/11/2019    REVISION AND REVASCULARIZATION RIGHT LOWER EXTREMITY performed by Chet Maldonado MD at [address] [facility] Right        FAMILY HISTORY    History reviewed. No pertinent family history. SOCIAL HISTORY    Social History     Tobacco Use    Smoking status: Current Every Day Smoker     Packs/day: 0.50     Years: 13.00     Pack years: 6.50     Types: Cigarettes    Smokeless tobacco: Never Used   Substance Use Topics    Alcohol use: No     Comment: in nursing home for 3 years    Drug use: No       ALLERGIES    No Known Allergies    MEDICATIONS    Current Outpatient Medications on File Prior to Encounter   Medication Sig Dispense Refill    Lactobacillus (ACIDOPHILUS) CAPS capsule Take 1 capsule by mouth daily      oxyCODONE-acetaminophen (PERCOCET) 5-325 MG per tablet Take 1 tablet by mouth every 6 hours as needed for Pain.       vitamin C (ASCORBIC ACID) 500 MG tablet Take 500 mg by mouth daily      divalproex (DEPAKOTE SPRINKLE) 125 MG capsule Take 125 mg by mouth 3 times daily      potassium chloride (MICRO-K) 10 MEQ extended release capsule Take 10 mEq by mouth daily      ipratropium-albuterol (DUONEB) 0.5-2.5 (3) MG/3ML SOLN nebulizer solution Inhale 1 vial into the lungs every 8 hours as needed for Shortness of Breath (COPD)      DULoxetine (CYMBALTA) 30 MG extended release capsule Take 1 capsule by mouth daily 30 capsule 3    phenytoin (DILANTIN) 100 MG ER capsule Take 200 mg by mouth 2 times daily MORNING AND AT BEDTIME      traZODone (DESYREL) 50 MG tablet Take 50 mg by mouth nightly      docusate sodium (COLACE) 100 MG capsule Take 100 mg by mouth every morning      aspirin 81 MG chewable tablet Take 81 mg by mouth daily      atorvastatin (LIPITOR) 10 MG tablet Take 10 mg by mouth nightly       vitamin D 1000 UNITS CAPS Take 1 capsule by mouth daily       isosorbide mononitrate

## 2019-07-12 NOTE — ED PROVIDER NOTES
[facility] ED  eMERGENCY dEPARTMENTeNCOUnter      Pt Name: Kena Sanders  MRN: [id number]  [place] 1960  Date ofevaluation: 7/12/2019  Provider: Maykel Husain DO    CHIEF COMPLAINT       Chief Complaint   Patient presents with    Other     pt sent down from the wound center for increased pain and swelling in right BKA         HISTORY OF PRESENT ILLNESS   (Location/Symptom, Timing/Onset,Context/Setting, Quality, Duration, Modifying Factors, Severity)  Note limiting factors. Kena Sanders is a 60 y.o. female who presents to the emergency department . Patient is here from the wound center. She was not sent here. She decided to come here on her own. She was seen by Dr. Shay Abdi, podiatry and Dr. Nelia Juarez, Vascular surgeon. It was decided that her BKA needs to be converted to an AKA because of the chronic infection. She is scheduled for surgery July 19. She did not tell me this. She told me that she came down for pain medication. Patient has pain medication at the nursing home. Patient is getting IV antibiotics at the nursing home for her BKA infection. HPI    NursingNotes were reviewed. REVIEW OF SYSTEMS    (2-9 systems for level 4, 10 or more for level 5)     Review of Systems   Constitutional: Negative for activity change, appetite change and fatigue. HENT: Negative for congestion and sore throat. Eyes: Negative for pain and visual disturbance. Respiratory: Negative for chest tightness and shortness of breath. Cardiovascular: Negative for chest pain. Gastrointestinal: Negative for abdominal pain, nausea and vomiting. Endocrine: Negative for polydipsia. Genitourinary: Negative for flank pain and urgency. Musculoskeletal: Negative for gait problem and neck stiffness. BKA pain   Skin: Negative for rash. Neurological: Negative for weakness, light-headedness and headaches. Psychiatric/Behavioral: Negative for confusion and sleep disturbance.        Except as noted above the remainder of the review of systems was reviewed and negative.        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     Athscl heart disease of native coronary artery w/o ang pctrs     CAD (coronary artery disease)     Cancer (HCC)     right BKA    Constipation     Constipation     Depression     MAJOR DEPRESSIVE DISORDER    Diabetes mellitus (HCC)     TYPE II    Diabetic neuropathy (HCC)     Hyperlipidemia     Hypertension     Insomnia     Muscle weakness     PVD (peripheral vascular disease) (HCC)     Seizures ([address]     Vascular dementia     Vitamin D deficiency     Weakness          SURGICALHISTORY       Past Surgical History:   Procedure Laterality Date    ARTERIOGRAM Right 6/11/2019    RIGHT ARM AORTO FEMORAL DIGITAL SUBTRACTION ARTERIOGRAM performed by Emmie Bennett MD at [address] [place] [address] Right 6/11/2019    REVISION AND REVASCULARIZATION RIGHT LOWER EXTREMITY performed by Emmie Bennett MD at [address] [place] [address] Right          CURRENT MEDICATIONS       Previous Medications    ASPIRIN 81 MG CHEWABLE TABLET    Take 81 mg by mouth daily    ATORVASTATIN (LIPITOR) 10 MG TABLET    Take 10 mg by mouth nightly     DIVALPROEX (DEPAKOTE SPRINKLE) 125 MG CAPSULE    Take 125 mg by mouth 3 times daily    DOCUSATE SODIUM (COLACE) 100 MG CAPSULE    Take 100 mg by mouth every morning    DULOXETINE (CYMBALTA) 30 MG EXTENDED RELEASE CAPSULE    Take 1 capsule by mouth daily    FUROSEMIDE (LASIX) 20 MG TABLET    Take 40 mg by mouth daily     IPRATROPIUM-ALBUTEROL (DUONEB) 0.5-2.5 (3) MG/3ML SOLN NEBULIZER SOLUTION    Inhale 1 vial into the lungs every 8 hours as needed for Shortness of Breath (COPD)    ISOSORBIDE MONONITRATE (IMDUR) 30 MG EXTENDED RELEASE TABLET    Take 30 mg by mouth daily    LACTOBACILLUS (ACIDOPHILUS) CAPS CAPSULE Gets together: None     Attends Rastafari service: None     Active member of club or organization: None     Attends meetings of clubs or organizations: None     Relationship status: None    Intimate partner violence:     Fear of current or ex partner: None     Emotionally abused: None     Physically abused: None     Forced sexual activity: None   Other Topics Concern    None   Social History Narrative    None       SCREENINGS              PHYSICAL EXAM    (up to 7 for level 4, 8 or more for level 5)     ED Triage Vitals [07/12/19 1149]   BP Temp Temp Source Pulse Resp SpO2 Height Weight   137/82 97.9 °F (36.6 °C) Temporal 80 20 99 % 5' 2.5\" (1.588 m) 216 lb (98 kg)       Physical Exam   Constitutional: She is oriented to person, place, and time. She appears well-developed and well-nourished. No distress. HENT:   Head: Normocephalic and atraumatic. Right Ear: External ear normal.   Left Ear: External ear normal.   Mouth/Throat: No oropharyngeal exudate. Eyes: Pupils are equal, round, and reactive to light. Conjunctivae are normal.   Neck: Normal range of motion. Neck supple. No JVD present. No tracheal deviation present. No thyromegaly present. Cardiovascular: Normal rate and normal heart sounds. No murmur heard. Pulmonary/Chest: Effort normal and breath sounds normal. No respiratory distress. She has no wheezes. Abdominal: Soft. Bowel sounds are normal. There is no tenderness. There is no guarding. Musculoskeletal: She exhibits edema and tenderness. Right BKA stump is red and warm. There is a open wound that is packed. Good pulses in the popliteal area. Neurological: She is alert and oriented to person, place, and time. No cranial nerve deficit. Skin: Skin is warm and dry. No rash noted. She is not diaphoretic. Psychiatric: She has a normal mood and affect.  Her behavior is normal.       DIAGNOSTIC RESULTS     EKG: All EKG's are interpreted by the Emergency Department Physician who either

## 2019-07-17 LAB
BLOOD CULTURE, ROUTINE: NORMAL
CULTURE, BLOOD 2: NORMAL

## 2019-07-18 ENCOUNTER — HOSPITAL ENCOUNTER (OUTPATIENT)
Dept: PREADMISSION TESTING | Age: 59
Discharge: HOME | DRG: 305 | End: 2019-07-22
Payer: MEDICAID

## 2019-07-18 VITALS
RESPIRATION RATE: 16 BRPM | TEMPERATURE: 98 F | SYSTOLIC BLOOD PRESSURE: 123 MMHG | DIASTOLIC BLOOD PRESSURE: 58 MMHG | HEART RATE: 79 BPM | OXYGEN SATURATION: 98 % | HEIGHT: 64 IN | WEIGHT: 188 LBS | BODY MASS INDEX: 32.1 KG/M2

## 2019-07-18 LAB
ABO/RH: NORMAL
ALBUMIN SERPL-MCNC: 3.7 G/DL (ref 3.5–4.6)
ALP BLD-CCNC: 143 U/L (ref 40–130)
ALT SERPL-CCNC: 8 U/L (ref 0–33)
ANION GAP SERPL CALCULATED.3IONS-SCNC: 16 MEQ/L (ref 9–15)
ANTIBODY SCREEN: NORMAL
AST SERPL-CCNC: 12 U/L (ref 0–35)
BILIRUB SERPL-MCNC: <0.2 MG/DL (ref 0.2–0.7)
BILIRUBIN URINE: NEGATIVE
BLOOD, URINE: NEGATIVE
BUN BLDV-MCNC: 12 MG/DL (ref 6–20)
CALCIUM SERPL-MCNC: 9.3 MG/DL (ref 8.5–9.9)
CHLORIDE BLD-SCNC: 101 MEQ/L (ref 95–107)
CLARITY: CLEAR
CO2: 24 MEQ/L (ref 20–31)
COLOR: YELLOW
CREAT SERPL-MCNC: 0.65 MG/DL (ref 0.5–0.9)
EKG ATRIAL RATE: 82 BPM
EKG P AXIS: 44 DEGREES
EKG P-R INTERVAL: 170 MS
EKG Q-T INTERVAL: 428 MS
EKG QRS DURATION: 150 MS
EKG QTC CALCULATION (BAZETT): 500 MS
EKG R AXIS: -53 DEGREES
EKG T AXIS: 84 DEGREES
EKG VENTRICULAR RATE: 82 BPM
GFR AFRICAN AMERICAN: >60
GFR NON-AFRICAN AMERICAN: >60
GLOBULIN: 3.6 G/DL (ref 2.3–3.5)
GLUCOSE BLD-MCNC: 108 MG/DL (ref 70–99)
GLUCOSE URINE: NEGATIVE MG/DL
HCT VFR BLD CALC: 34.6 % (ref 37–47)
HEMOGLOBIN: 11.8 G/DL (ref 12–16)
KETONES, URINE: NEGATIVE MG/DL
LEUKOCYTE ESTERASE, URINE: NEGATIVE
MCH RBC QN AUTO: 33.9 PG (ref 27–31.3)
MCHC RBC AUTO-ENTMCNC: 34.2 % (ref 33–37)
MCV RBC AUTO: 99 FL (ref 82–100)
NITRITE, URINE: NEGATIVE
PDW BLD-RTO: 13.5 % (ref 11.5–14.5)
PH UA: 6 (ref 5–9)
PLATELET # BLD: 291 K/UL (ref 130–400)
POTASSIUM SERPL-SCNC: 4.3 MEQ/L (ref 3.4–4.9)
PROTEIN UA: NEGATIVE MG/DL
RBC # BLD: 3.49 M/UL (ref 4.2–5.4)
SODIUM BLD-SCNC: 141 MEQ/L (ref 135–144)
SPECIFIC GRAVITY UA: 1.01 (ref 1–1.03)
TOTAL PROTEIN: 7.3 G/DL (ref 6.3–8)
UROBILINOGEN, URINE: 0.2 E.U./DL
WBC # BLD: 6.6 K/UL (ref 4.8–10.8)

## 2019-07-18 PROCEDURE — 86900 BLOOD TYPING SEROLOGIC ABO: CPT

## 2019-07-18 PROCEDURE — 86850 RBC ANTIBODY SCREEN: CPT

## 2019-07-18 PROCEDURE — 86901 BLOOD TYPING SEROLOGIC RH(D): CPT

## 2019-07-18 PROCEDURE — 93005 ELECTROCARDIOGRAM TRACING: CPT | Performed by: THORACIC SURGERY (CARDIOTHORACIC VASCULAR SURGERY)

## 2019-07-18 PROCEDURE — 80053 COMPREHEN METABOLIC PANEL: CPT

## 2019-07-18 PROCEDURE — 81003 URINALYSIS AUTO W/O SCOPE: CPT

## 2019-07-18 PROCEDURE — 85027 COMPLETE CBC AUTOMATED: CPT

## 2019-07-19 ENCOUNTER — HOSPITAL ENCOUNTER (INPATIENT)
Age: 59
LOS: 4 days | Discharge: NURSING, REHAB OR LONG-TERM CARE | DRG: 305 | End: 2019-07-23
Attending: THORACIC SURGERY (CARDIOTHORACIC VASCULAR SURGERY) | Admitting: THORACIC SURGERY (CARDIOTHORACIC VASCULAR SURGERY)
Payer: MEDICAID

## 2019-07-19 ENCOUNTER — APPOINTMENT (OUTPATIENT)
Dept: GENERAL RADIOLOGY | Age: 59
DRG: 305 | End: 2019-07-19
Attending: THORACIC SURGERY (CARDIOTHORACIC VASCULAR SURGERY)
Payer: MEDICAID

## 2019-07-19 ENCOUNTER — ANESTHESIA (OUTPATIENT)
Dept: OPERATING ROOM | Age: 59
DRG: 305 | End: 2019-07-19
Payer: MEDICAID

## 2019-07-19 ENCOUNTER — ANESTHESIA EVENT (OUTPATIENT)
Dept: OPERATING ROOM | Age: 59
DRG: 305 | End: 2019-07-19
Payer: MEDICAID

## 2019-07-19 VITALS — DIASTOLIC BLOOD PRESSURE: 73 MMHG | OXYGEN SATURATION: 100 % | SYSTOLIC BLOOD PRESSURE: 110 MMHG | TEMPERATURE: 97.7 F

## 2019-07-19 PROBLEM — I73.9 PAD (PERIPHERAL ARTERY DISEASE) (HCC): Status: ACTIVE | Noted: 2019-07-19

## 2019-07-19 LAB
GLUCOSE BLD-MCNC: 112 MG/DL (ref 60–115)
PERFORMED ON: NORMAL

## 2019-07-19 PROCEDURE — 6360000002 HC RX W HCPCS: Performed by: ANESTHESIOLOGY

## 2019-07-19 PROCEDURE — 0Y6C0Z3 DETACHMENT AT RIGHT UPPER LEG, LOW, OPEN APPROACH: ICD-10-PCS | Performed by: THORACIC SURGERY (CARDIOTHORACIC VASCULAR SURGERY)

## 2019-07-19 PROCEDURE — 2580000003 HC RX 258: Performed by: NURSE ANESTHETIST, CERTIFIED REGISTERED

## 2019-07-19 PROCEDURE — 93010 ELECTROCARDIOGRAM REPORT: CPT | Performed by: INTERNAL MEDICINE

## 2019-07-19 PROCEDURE — 3700000001 HC ADD 15 MINUTES (ANESTHESIA): Performed by: THORACIC SURGERY (CARDIOTHORACIC VASCULAR SURGERY)

## 2019-07-19 PROCEDURE — 3600000003 HC SURGERY LEVEL 3 BASE: Performed by: THORACIC SURGERY (CARDIOTHORACIC VASCULAR SURGERY)

## 2019-07-19 PROCEDURE — 2580000003 HC RX 258: Performed by: ANESTHESIOLOGY

## 2019-07-19 PROCEDURE — 2709999900 HC NON-CHARGEABLE SUPPLY: Performed by: THORACIC SURGERY (CARDIOTHORACIC VASCULAR SURGERY)

## 2019-07-19 PROCEDURE — 2000000000 HC ICU R&B

## 2019-07-19 PROCEDURE — 71045 X-RAY EXAM CHEST 1 VIEW: CPT

## 2019-07-19 PROCEDURE — 6360000002 HC RX W HCPCS: Performed by: THORACIC SURGERY (CARDIOTHORACIC VASCULAR SURGERY)

## 2019-07-19 PROCEDURE — 64447 NJX AA&/STRD FEMORAL NRV IMG: CPT | Performed by: ANESTHESIOLOGY

## 2019-07-19 PROCEDURE — 2500000003 HC RX 250 WO HCPCS: Performed by: NURSE ANESTHETIST, CERTIFIED REGISTERED

## 2019-07-19 PROCEDURE — 2720000010 HC SURG SUPPLY STERILE: Performed by: THORACIC SURGERY (CARDIOTHORACIC VASCULAR SURGERY)

## 2019-07-19 PROCEDURE — 3700000000 HC ANESTHESIA ATTENDED CARE: Performed by: THORACIC SURGERY (CARDIOTHORACIC VASCULAR SURGERY)

## 2019-07-19 PROCEDURE — 2580000003 HC RX 258: Performed by: THORACIC SURGERY (CARDIOTHORACIC VASCULAR SURGERY)

## 2019-07-19 PROCEDURE — 6360000002 HC RX W HCPCS: Performed by: NURSE ANESTHETIST, CERTIFIED REGISTERED

## 2019-07-19 PROCEDURE — 3600000013 HC SURGERY LEVEL 3 ADDTL 15MIN: Performed by: THORACIC SURGERY (CARDIOTHORACIC VASCULAR SURGERY)

## 2019-07-19 PROCEDURE — 6370000000 HC RX 637 (ALT 250 FOR IP): Performed by: PHYSICIAN ASSISTANT

## 2019-07-19 PROCEDURE — 88307 TISSUE EXAM BY PATHOLOGIST: CPT

## 2019-07-19 RX ORDER — TRAZODONE HYDROCHLORIDE 50 MG/1
50 TABLET ORAL NIGHTLY
Status: DISCONTINUED | OUTPATIENT
Start: 2019-07-19 | End: 2019-07-23 | Stop reason: HOSPADM

## 2019-07-19 RX ORDER — ONDANSETRON 2 MG/ML
4 INJECTION INTRAMUSCULAR; INTRAVENOUS
Status: DISCONTINUED | OUTPATIENT
Start: 2019-07-19 | End: 2019-07-19 | Stop reason: HOSPADM

## 2019-07-19 RX ORDER — MEPERIDINE HYDROCHLORIDE 25 MG/ML
12.5 INJECTION INTRAMUSCULAR; INTRAVENOUS; SUBCUTANEOUS EVERY 5 MIN PRN
Status: DISCONTINUED | OUTPATIENT
Start: 2019-07-19 | End: 2019-07-19 | Stop reason: HOSPADM

## 2019-07-19 RX ORDER — IPRATROPIUM BROMIDE AND ALBUTEROL SULFATE 2.5; .5 MG/3ML; MG/3ML
1 SOLUTION RESPIRATORY (INHALATION) EVERY 8 HOURS PRN
Status: DISCONTINUED | OUTPATIENT
Start: 2019-07-19 | End: 2019-07-23 | Stop reason: HOSPADM

## 2019-07-19 RX ORDER — PROPOFOL 10 MG/ML
INJECTION, EMULSION INTRAVENOUS PRN
Status: DISCONTINUED | OUTPATIENT
Start: 2019-07-19 | End: 2019-07-19 | Stop reason: SDUPTHER

## 2019-07-19 RX ORDER — SODIUM CHLORIDE 450 MG/100ML
INJECTION, SOLUTION INTRAVENOUS CONTINUOUS
Status: DISPENSED | OUTPATIENT
Start: 2019-07-19 | End: 2019-07-20

## 2019-07-19 RX ORDER — SODIUM CHLORIDE, SODIUM LACTATE, POTASSIUM CHLORIDE, CALCIUM CHLORIDE 600; 310; 30; 20 MG/100ML; MG/100ML; MG/100ML; MG/100ML
INJECTION, SOLUTION INTRAVENOUS CONTINUOUS
Status: DISCONTINUED | OUTPATIENT
Start: 2019-07-19 | End: 2019-07-19

## 2019-07-19 RX ORDER — SODIUM CHLORIDE 0.9 % (FLUSH) 0.9 %
10 SYRINGE (ML) INJECTION PRN
Status: DISCONTINUED | OUTPATIENT
Start: 2019-07-19 | End: 2019-07-23 | Stop reason: HOSPADM

## 2019-07-19 RX ORDER — HYDROCODONE BITARTRATE AND ACETAMINOPHEN 5; 325 MG/1; MG/1
1 TABLET ORAL PRN
Status: DISCONTINUED | OUTPATIENT
Start: 2019-07-19 | End: 2019-07-19 | Stop reason: HOSPADM

## 2019-07-19 RX ORDER — GLYCOPYRROLATE 1 MG/5 ML
SYRINGE (ML) INTRAVENOUS PRN
Status: DISCONTINUED | OUTPATIENT
Start: 2019-07-19 | End: 2019-07-19 | Stop reason: SDUPTHER

## 2019-07-19 RX ORDER — L. ACIDOPHILUS/L.BULGARICUS 1MM CELL
1 TABLET ORAL DAILY
Status: DISCONTINUED | OUTPATIENT
Start: 2019-07-19 | End: 2019-07-23 | Stop reason: HOSPADM

## 2019-07-19 RX ORDER — FUROSEMIDE 40 MG/1
40 TABLET ORAL DAILY
Status: DISCONTINUED | OUTPATIENT
Start: 2019-07-19 | End: 2019-07-20

## 2019-07-19 RX ORDER — SODIUM CHLORIDE 0.9 % (FLUSH) 0.9 %
10 SYRINGE (ML) INJECTION EVERY 12 HOURS SCHEDULED
Status: DISCONTINUED | OUTPATIENT
Start: 2019-07-19 | End: 2019-07-19 | Stop reason: HOSPADM

## 2019-07-19 RX ORDER — ASCORBIC ACID 500 MG
500 TABLET ORAL DAILY
Status: DISCONTINUED | OUTPATIENT
Start: 2019-07-19 | End: 2019-07-23 | Stop reason: HOSPADM

## 2019-07-19 RX ORDER — HYDROCODONE BITARTRATE AND ACETAMINOPHEN 5; 325 MG/1; MG/1
2 TABLET ORAL PRN
Status: DISCONTINUED | OUTPATIENT
Start: 2019-07-19 | End: 2019-07-19 | Stop reason: HOSPADM

## 2019-07-19 RX ORDER — FENTANYL CITRATE 50 UG/ML
INJECTION, SOLUTION INTRAMUSCULAR; INTRAVENOUS PRN
Status: DISCONTINUED | OUTPATIENT
Start: 2019-07-19 | End: 2019-07-19 | Stop reason: SDUPTHER

## 2019-07-19 RX ORDER — MAGNESIUM HYDROXIDE 1200 MG/15ML
LIQUID ORAL CONTINUOUS PRN
Status: COMPLETED | OUTPATIENT
Start: 2019-07-19 | End: 2019-07-19

## 2019-07-19 RX ORDER — ONDANSETRON 2 MG/ML
INJECTION INTRAMUSCULAR; INTRAVENOUS PRN
Status: DISCONTINUED | OUTPATIENT
Start: 2019-07-19 | End: 2019-07-19 | Stop reason: SDUPTHER

## 2019-07-19 RX ORDER — FENTANYL CITRATE 50 UG/ML
50 INJECTION, SOLUTION INTRAMUSCULAR; INTRAVENOUS EVERY 10 MIN PRN
Status: DISCONTINUED | OUTPATIENT
Start: 2019-07-19 | End: 2019-07-19 | Stop reason: HOSPADM

## 2019-07-19 RX ORDER — LIDOCAINE HYDROCHLORIDE 10 MG/ML
1 INJECTION, SOLUTION EPIDURAL; INFILTRATION; INTRACAUDAL; PERINEURAL
Status: DISCONTINUED | OUTPATIENT
Start: 2019-07-19 | End: 2019-07-19 | Stop reason: HOSPADM

## 2019-07-19 RX ORDER — PHENYTOIN SODIUM 100 MG/1
100 CAPSULE, EXTENDED RELEASE ORAL
Status: DISCONTINUED | OUTPATIENT
Start: 2019-07-19 | End: 2019-07-23 | Stop reason: HOSPADM

## 2019-07-19 RX ORDER — PHENYTOIN SODIUM 100 MG/1
200 CAPSULE, EXTENDED RELEASE ORAL 2 TIMES DAILY
Status: DISCONTINUED | OUTPATIENT
Start: 2019-07-19 | End: 2019-07-23 | Stop reason: HOSPADM

## 2019-07-19 RX ORDER — DEXAMETHASONE SODIUM PHOSPHATE 10 MG/ML
INJECTION INTRAMUSCULAR; INTRAVENOUS PRN
Status: DISCONTINUED | OUTPATIENT
Start: 2019-07-19 | End: 2019-07-19 | Stop reason: SDUPTHER

## 2019-07-19 RX ORDER — POTASSIUM CHLORIDE 750 MG/1
10 CAPSULE, EXTENDED RELEASE ORAL DAILY
Status: DISCONTINUED | OUTPATIENT
Start: 2019-07-19 | End: 2019-07-23 | Stop reason: HOSPADM

## 2019-07-19 RX ORDER — SODIUM CHLORIDE 0.9 % (FLUSH) 0.9 %
10 SYRINGE (ML) INJECTION EVERY 12 HOURS SCHEDULED
Status: DISCONTINUED | OUTPATIENT
Start: 2019-07-19 | End: 2019-07-23 | Stop reason: HOSPADM

## 2019-07-19 RX ORDER — LIDOCAINE HYDROCHLORIDE 20 MG/ML
INJECTION, SOLUTION INTRAVENOUS PRN
Status: DISCONTINUED | OUTPATIENT
Start: 2019-07-19 | End: 2019-07-19 | Stop reason: SDUPTHER

## 2019-07-19 RX ORDER — ROPIVACAINE HYDROCHLORIDE 5 MG/ML
INJECTION, SOLUTION EPIDURAL; INFILTRATION; PERINEURAL PRN
Status: DISCONTINUED | OUTPATIENT
Start: 2019-07-19 | End: 2019-07-19 | Stop reason: SDUPTHER

## 2019-07-19 RX ORDER — ISOSORBIDE MONONITRATE 30 MG/1
30 TABLET, EXTENDED RELEASE ORAL DAILY
Status: DISCONTINUED | OUTPATIENT
Start: 2019-07-19 | End: 2019-07-20

## 2019-07-19 RX ORDER — DULOXETIN HYDROCHLORIDE 30 MG/1
30 CAPSULE, DELAYED RELEASE ORAL DAILY
Status: DISCONTINUED | OUTPATIENT
Start: 2019-07-19 | End: 2019-07-23 | Stop reason: HOSPADM

## 2019-07-19 RX ORDER — DIVALPROEX SODIUM 125 MG/1
125 CAPSULE, COATED PELLETS ORAL 3 TIMES DAILY
Status: DISCONTINUED | OUTPATIENT
Start: 2019-07-19 | End: 2019-07-23 | Stop reason: HOSPADM

## 2019-07-19 RX ORDER — ZONISAMIDE 100 MG/1
100 CAPSULE ORAL DAILY
Status: DISCONTINUED | OUTPATIENT
Start: 2019-07-19 | End: 2019-07-23 | Stop reason: HOSPADM

## 2019-07-19 RX ORDER — DOCUSATE SODIUM 100 MG/1
100 CAPSULE, LIQUID FILLED ORAL EVERY MORNING
Status: DISCONTINUED | OUTPATIENT
Start: 2019-07-20 | End: 2019-07-23 | Stop reason: HOSPADM

## 2019-07-19 RX ORDER — METOPROLOL SUCCINATE 25 MG/1
25 TABLET, EXTENDED RELEASE ORAL DAILY
Status: DISCONTINUED | OUTPATIENT
Start: 2019-07-19 | End: 2019-07-23 | Stop reason: HOSPADM

## 2019-07-19 RX ORDER — DIPHENHYDRAMINE HYDROCHLORIDE 50 MG/ML
12.5 INJECTION INTRAMUSCULAR; INTRAVENOUS
Status: DISCONTINUED | OUTPATIENT
Start: 2019-07-19 | End: 2019-07-19 | Stop reason: HOSPADM

## 2019-07-19 RX ORDER — SODIUM CHLORIDE 0.9 % (FLUSH) 0.9 %
10 SYRINGE (ML) INJECTION PRN
Status: DISCONTINUED | OUTPATIENT
Start: 2019-07-19 | End: 2019-07-19 | Stop reason: HOSPADM

## 2019-07-19 RX ORDER — ONDANSETRON 2 MG/ML
4 INJECTION INTRAMUSCULAR; INTRAVENOUS EVERY 6 HOURS PRN
Status: DISCONTINUED | OUTPATIENT
Start: 2019-07-19 | End: 2019-07-23 | Stop reason: HOSPADM

## 2019-07-19 RX ORDER — MIDAZOLAM HYDROCHLORIDE 1 MG/ML
INJECTION INTRAMUSCULAR; INTRAVENOUS PRN
Status: DISCONTINUED | OUTPATIENT
Start: 2019-07-19 | End: 2019-07-19 | Stop reason: SDUPTHER

## 2019-07-19 RX ORDER — METOCLOPRAMIDE HYDROCHLORIDE 5 MG/ML
10 INJECTION INTRAMUSCULAR; INTRAVENOUS
Status: DISCONTINUED | OUTPATIENT
Start: 2019-07-19 | End: 2019-07-19 | Stop reason: HOSPADM

## 2019-07-19 RX ORDER — ATORVASTATIN CALCIUM 10 MG/1
10 TABLET, FILM COATED ORAL NIGHTLY
Status: DISCONTINUED | OUTPATIENT
Start: 2019-07-19 | End: 2019-07-20

## 2019-07-19 RX ORDER — ROCURONIUM BROMIDE 10 MG/ML
INJECTION, SOLUTION INTRAVENOUS PRN
Status: DISCONTINUED | OUTPATIENT
Start: 2019-07-19 | End: 2019-07-19 | Stop reason: SDUPTHER

## 2019-07-19 RX ORDER — SODIUM CHLORIDE 9 MG/ML
INJECTION, SOLUTION INTRAVENOUS CONTINUOUS
Status: DISCONTINUED | OUTPATIENT
Start: 2019-07-19 | End: 2019-07-23 | Stop reason: HOSPADM

## 2019-07-19 RX ADMIN — ROCURONIUM BROMIDE 30 MG: 10 INJECTION INTRAVENOUS at 12:49

## 2019-07-19 RX ADMIN — PROPOFOL 100 MG: 10 INJECTION, EMULSION INTRAVENOUS at 12:10

## 2019-07-19 RX ADMIN — FENTANYL CITRATE 100 MCG: 50 INJECTION, SOLUTION INTRAMUSCULAR; INTRAVENOUS at 12:10

## 2019-07-19 RX ADMIN — SODIUM CHLORIDE 75 ML/HR: 4.5 INJECTION, SOLUTION INTRAVENOUS at 15:07

## 2019-07-19 RX ADMIN — DIVALPROEX SODIUM 125 MG: 125 CAPSULE, COATED PELLETS ORAL at 20:50

## 2019-07-19 RX ADMIN — FENTANYL CITRATE 100 MCG: 50 INJECTION, SOLUTION INTRAMUSCULAR; INTRAVENOUS at 14:15

## 2019-07-19 RX ADMIN — LACTOBACILLUS TAB 1 TABLET: TAB at 20:51

## 2019-07-19 RX ADMIN — LIDOCAINE HYDROCHLORIDE 50 MG: 20 INJECTION, SOLUTION INTRAVENOUS at 12:10

## 2019-07-19 RX ADMIN — DEXAMETHASONE SODIUM PHOSPHATE 10 MG: 10 INJECTION INTRAMUSCULAR; INTRAVENOUS at 09:50

## 2019-07-19 RX ADMIN — ROCURONIUM BROMIDE 20 MG: 10 INJECTION INTRAVENOUS at 12:23

## 2019-07-19 RX ADMIN — MIDAZOLAM HYDROCHLORIDE 2 MG: 1 INJECTION, SOLUTION INTRAMUSCULAR; INTRAVENOUS at 12:04

## 2019-07-19 RX ADMIN — POTASSIUM CHLORIDE 10 MEQ: 750 CAPSULE, EXTENDED RELEASE ORAL at 22:52

## 2019-07-19 RX ADMIN — PHENYLEPHRINE HYDROCHLORIDE 100 MCG: 10 INJECTION INTRAVENOUS at 13:13

## 2019-07-19 RX ADMIN — ROCURONIUM BROMIDE 20 MG: 10 INJECTION INTRAVENOUS at 12:37

## 2019-07-19 RX ADMIN — HYDROMORPHONE HYDROCHLORIDE 0.5 MG: 1 INJECTION, SOLUTION INTRAMUSCULAR; INTRAVENOUS; SUBCUTANEOUS at 10:08

## 2019-07-19 RX ADMIN — TRAZODONE HYDROCHLORIDE 50 MG: 50 TABLET ORAL at 20:50

## 2019-07-19 RX ADMIN — DULOXETINE HYDROCHLORIDE 30 MG: 30 CAPSULE, DELAYED RELEASE ORAL at 20:50

## 2019-07-19 RX ADMIN — SODIUM CHLORIDE, POTASSIUM CHLORIDE, SODIUM LACTATE AND CALCIUM CHLORIDE: 600; 310; 30; 20 INJECTION, SOLUTION INTRAVENOUS at 14:04

## 2019-07-19 RX ADMIN — PHENYTOIN SODIUM 200 MG: 100 CAPSULE ORAL at 20:51

## 2019-07-19 RX ADMIN — HYDROMORPHONE HYDROCHLORIDE 0.5 MG: 1 INJECTION, SOLUTION INTRAMUSCULAR; INTRAVENOUS; SUBCUTANEOUS at 16:49

## 2019-07-19 RX ADMIN — HYDROMORPHONE HYDROCHLORIDE 0.5 MG: 1 INJECTION, SOLUTION INTRAMUSCULAR; INTRAVENOUS; SUBCUTANEOUS at 20:50

## 2019-07-19 RX ADMIN — ONDANSETRON 4 MG: 2 INJECTION INTRAMUSCULAR; INTRAVENOUS at 12:10

## 2019-07-19 RX ADMIN — SODIUM CHLORIDE, POTASSIUM CHLORIDE, SODIUM LACTATE AND CALCIUM CHLORIDE: 600; 310; 30; 20 INJECTION, SOLUTION INTRAVENOUS at 11:50

## 2019-07-19 RX ADMIN — Medication 10 ML: at 20:53

## 2019-07-19 RX ADMIN — ZONISAMIDE 100 MG: 100 CAPSULE ORAL at 20:50

## 2019-07-19 RX ADMIN — Medication 0.2 MG: at 12:23

## 2019-07-19 RX ADMIN — MIDAZOLAM HYDROCHLORIDE 4 MG: 1 INJECTION, SOLUTION INTRAMUSCULAR; INTRAVENOUS at 09:40

## 2019-07-19 RX ADMIN — OXYCODONE HYDROCHLORIDE AND ACETAMINOPHEN 500 MG: 500 TABLET ORAL at 20:50

## 2019-07-19 RX ADMIN — ATORVASTATIN CALCIUM 10 MG: 10 TABLET, FILM COATED ORAL at 20:52

## 2019-07-19 RX ADMIN — Medication 2 G: at 12:06

## 2019-07-19 RX ADMIN — SUGAMMADEX 400 MG: 100 INJECTION, SOLUTION INTRAVENOUS at 14:10

## 2019-07-19 RX ADMIN — ROPIVACAINE HYDROCHLORIDE 30 ML: 5 INJECTION, SOLUTION EPIDURAL; INFILTRATION; PERINEURAL at 09:50

## 2019-07-19 RX ADMIN — ROCURONIUM BROMIDE 30 MG: 10 INJECTION INTRAVENOUS at 12:10

## 2019-07-19 RX ADMIN — VITAMIN D, TAB 1000IU (100/BT) 1000 UNITS: 25 TAB at 20:51

## 2019-07-19 RX ADMIN — FUROSEMIDE 40 MG: 40 TABLET ORAL at 20:51

## 2019-07-19 RX ADMIN — FENTANYL CITRATE 100 MCG: 50 INJECTION, SOLUTION INTRAMUSCULAR; INTRAVENOUS at 12:52

## 2019-07-19 RX ADMIN — PHENYLEPHRINE HYDROCHLORIDE 17 MCG/MIN: 10 INJECTION INTRAVENOUS at 12:20

## 2019-07-19 RX ADMIN — METOPROLOL SUCCINATE 25 MG: 25 TABLET, EXTENDED RELEASE ORAL at 20:51

## 2019-07-19 RX ADMIN — SODIUM CHLORIDE: 9 INJECTION, SOLUTION INTRAVENOUS at 10:08

## 2019-07-19 RX ADMIN — ISOSORBIDE MONONITRATE 30 MG: 30 TABLET, EXTENDED RELEASE ORAL at 20:51

## 2019-07-19 ASSESSMENT — PULMONARY FUNCTION TESTS
PIF_VALUE: 2
PIF_VALUE: 0
PIF_VALUE: 1
PIF_VALUE: 0
PIF_VALUE: 19
PIF_VALUE: 22
PIF_VALUE: 17
PIF_VALUE: 20
PIF_VALUE: 15
PIF_VALUE: 1
PIF_VALUE: 15
PIF_VALUE: 21
PIF_VALUE: 17
PIF_VALUE: 19
PIF_VALUE: 18
PIF_VALUE: 20
PIF_VALUE: 20
PIF_VALUE: 17
PIF_VALUE: 21
PIF_VALUE: 18
PIF_VALUE: 17
PIF_VALUE: 23
PIF_VALUE: 16
PIF_VALUE: 21
PIF_VALUE: 18
PIF_VALUE: 14
PIF_VALUE: 20
PIF_VALUE: 17
PIF_VALUE: 23
PIF_VALUE: 0
PIF_VALUE: 23
PIF_VALUE: 20
PIF_VALUE: 0
PIF_VALUE: 0
PIF_VALUE: 15
PIF_VALUE: 21
PIF_VALUE: 21
PIF_VALUE: 16
PIF_VALUE: 20
PIF_VALUE: 17
PIF_VALUE: 17
PIF_VALUE: 15
PIF_VALUE: 18
PIF_VALUE: 17
PIF_VALUE: 17
PIF_VALUE: 6
PIF_VALUE: 19
PIF_VALUE: 20
PIF_VALUE: 14
PIF_VALUE: 0
PIF_VALUE: 17
PIF_VALUE: 14
PIF_VALUE: 17
PIF_VALUE: 20
PIF_VALUE: 17
PIF_VALUE: 2
PIF_VALUE: 0
PIF_VALUE: 18
PIF_VALUE: 21
PIF_VALUE: 20
PIF_VALUE: 25
PIF_VALUE: 0
PIF_VALUE: 5
PIF_VALUE: 0
PIF_VALUE: 20
PIF_VALUE: 21
PIF_VALUE: 20
PIF_VALUE: 19
PIF_VALUE: 17
PIF_VALUE: 14
PIF_VALUE: 17
PIF_VALUE: 19
PIF_VALUE: 19
PIF_VALUE: 6
PIF_VALUE: 20
PIF_VALUE: 20
PIF_VALUE: 21
PIF_VALUE: 16
PIF_VALUE: 14
PIF_VALUE: 0
PIF_VALUE: 21
PIF_VALUE: 17
PIF_VALUE: 20
PIF_VALUE: 19
PIF_VALUE: 17
PIF_VALUE: 17
PIF_VALUE: 0
PIF_VALUE: 19
PIF_VALUE: 21
PIF_VALUE: 5
PIF_VALUE: 18
PIF_VALUE: 19
PIF_VALUE: 14
PIF_VALUE: 22
PIF_VALUE: 17
PIF_VALUE: 20
PIF_VALUE: 2
PIF_VALUE: 14
PIF_VALUE: 19
PIF_VALUE: 0
PIF_VALUE: 20
PIF_VALUE: 0
PIF_VALUE: 18
PIF_VALUE: 17
PIF_VALUE: 16
PIF_VALUE: 17
PIF_VALUE: 0
PIF_VALUE: 17
PIF_VALUE: 19
PIF_VALUE: 18
PIF_VALUE: 20
PIF_VALUE: 19
PIF_VALUE: 19
PIF_VALUE: 17
PIF_VALUE: 1
PIF_VALUE: 18
PIF_VALUE: 18
PIF_VALUE: 17
PIF_VALUE: 14
PIF_VALUE: 24
PIF_VALUE: 8
PIF_VALUE: 23
PIF_VALUE: 16
PIF_VALUE: 17
PIF_VALUE: 0
PIF_VALUE: 14
PIF_VALUE: 18
PIF_VALUE: 17
PIF_VALUE: 17
PIF_VALUE: 21
PIF_VALUE: 20
PIF_VALUE: 19
PIF_VALUE: 18
PIF_VALUE: 21
PIF_VALUE: 8
PIF_VALUE: 22
PIF_VALUE: 17
PIF_VALUE: 21
PIF_VALUE: 17
PIF_VALUE: 20
PIF_VALUE: 0
PIF_VALUE: 17
PIF_VALUE: 20
PIF_VALUE: 20
PIF_VALUE: 21
PIF_VALUE: 18
PIF_VALUE: 0

## 2019-07-19 ASSESSMENT — PAIN SCALES - GENERAL
PAINLEVEL_OUTOF10: 3
PAINLEVEL_OUTOF10: 3
PAINLEVEL_OUTOF10: 4
PAINLEVEL_OUTOF10: 6
PAINLEVEL_OUTOF10: 10
PAINLEVEL_OUTOF10: 5
PAINLEVEL_OUTOF10: 5

## 2019-07-19 ASSESSMENT — PAIN DESCRIPTION - ORIENTATION
ORIENTATION: RIGHT
ORIENTATION: RIGHT

## 2019-07-19 ASSESSMENT — PAIN DESCRIPTION - PAIN TYPE
TYPE: CHRONIC PAIN;SURGICAL PAIN
TYPE: CHRONIC PAIN;SURGICAL PAIN

## 2019-07-19 ASSESSMENT — PAIN - FUNCTIONAL ASSESSMENT: PAIN_FUNCTIONAL_ASSESSMENT: 0-10

## 2019-07-19 ASSESSMENT — PAIN DESCRIPTION - LOCATION
LOCATION: LEG
LOCATION: LEG

## 2019-07-19 ASSESSMENT — LIFESTYLE VARIABLES: SMOKING_STATUS: 1

## 2019-07-19 NOTE — ANESTHESIA PRE PROCEDURE
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     Athscl heart disease of native coronary artery w/o ang pctrs     CAD (coronary artery disease)     Cancer (HCC)     right BKA    Constipation     Constipation     Depression     MAJOR DEPRESSIVE DISORDER    Diabetes mellitus (HCC)     TYPE II    Diabetic neuropathy (HCC)     Hyperlipidemia     Hypertension     Insomnia     Muscle weakness     PVD (peripheral vascular disease) (HCC)     Seizures (HCC)     Vascular dementia     Vitamin D deficiency     Weakness        Past Surgical History:        Procedure Laterality Date    ARTERIOGRAM Right 6/11/2019    RIGHT ARM AORTO FEMORAL DIGITAL SUBTRACTION ARTERIOGRAM performed by Chet Maldonado MD at [facility] Right 6/11/2019    REVISION AND REVASCULARIZATION RIGHT LOWER EXTREMITY performed by Chet Maldonado MD at [address] [place] [address] Right        Social History:    Social History     Tobacco Use    Smoking status: Current Every Day Smoker     Packs/day: 0.50     Years: 13.00     Pack years: 6.50     Types: Cigarettes    Smokeless tobacco: Never Used   Substance Use Topics    Alcohol use: No     Comment: in nursing home for 3 years                                Ready to quit: Not Answered  Counseling given: Not Answered      Vital Signs (Current):   Vitals:    07/19/19 0823   BP: (!) 142/71   Pulse: 80   Resp: 16   Temp: 98.1 °F (36.7 °C)   TempSrc: Temporal   SpO2: 98%                                              BP Readings from Last 3 Encounters:   07/19/19 (!) 142/71   07/18/19 (!) 123/58   07/12/19 137/82       NPO Status: Time of last liquid consumption: 0000                        Time of last solid consumption: 0000                        Date of last liquid consumption: 07/19/19                        Date of last solid food consumption: 07/19/19    BMI:   Wt Readings from Last 3 Encounters:   07/18/19 188 lb (85.3 kg)   07/12/19 216 lb (98 kg)   06/14/19 216 lb (98 kg)     There is no height or weight on file to calculate BMI.    CBC:   Lab Results   Component Value Date    WBC 6.6 07/18/2019    RBC 3.49 07/18/2019    RBC 4.00 12/28/2011    HGB 11.8 07/18/2019    HCT 34.6 07/18/2019    MCV 99.0 07/18/2019    RDW 13.5 07/18/2019     07/18/2019       CMP:   Lab Results   Component Value Date     07/18/2019    K 4.3 07/18/2019    K 3.3 06/14/2019     07/18/2019    CO2 24 07/18/2019    BUN 12 07/18/2019    CREATININE 0.65 07/18/2019    GFRAA >60.0 07/18/2019    LABGLOM >60.0 07/18/2019    GLUCOSE 108 07/18/2019    GLUCOSE 94 12/28/2011    PROT 7.3 07/18/2019    CALCIUM 9.3 07/18/2019    BILITOT <0.2 07/18/2019    ALKPHOS 143 07/18/2019    AST 12 07/18/2019    ALT 8 07/18/2019       POC Tests: No results for input(s): POCGLU, POCNA, POCK, POCCL, POCBUN, POCHEMO, POCHCT in the last 72 hours.     Coags:   Lab Results   Component Value Date    PROTIME 10.6 05/12/2017    PROTIME 16.5 12/30/2013    INR 1.0 05/12/2017    APTT 24.3 05/12/2017       HCG (If Applicable):   Lab Results   Component Value Date    PREGTESTUR Negative 01/14/2019        ABGs: No results found for: PHART, PO2ART, MOF5FAR, FOG7ACW, BEART, Y2CEVBQA     Type & Screen (If Applicable):  No results found for: LABABO, [address]    Anesthesia Evaluation  Patient summary reviewed and Nursing notes reviewed no history of anesthetic complications:   Airway: Mallampati: II  TM distance: >3 FB   Neck ROM: full  Mouth opening: > = 3 FB Dental: normal exam         Pulmonary:   (+) current smoker                           Cardiovascular:    (+) hypertension:, CAD:, hyperlipidemia      ECG reviewed               Beta Blocker:  Dose within 24 Hrs         Neuro/Psych:   (+) CVA (aphasia):,

## 2019-07-19 NOTE — CONSULTS
Inpatient consult to Hospitalist  Consult performed by: EVELYN Fall  Consult ordered by: Mercy Victoria MD        Consult Note    Reason for Consult:  Management of DM2, HTN, HLD, PVD and CAD    Requesting Physician:  Mercy Victoria MD    HISTORY OF PRESENT ILLNESS:    The patient is a 60 y.o. female who is s/p right AKA per Dr. 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     Athscl heart disease of native coronary artery w/o ang pctrs     CAD (coronary artery disease)     Cancer ([facility]     right BKA    Constipation     Constipation     Depression     MAJOR DEPRESSIVE DISORDER    Diabetes mellitus ([facility]     TYPE II    Diabetic neuropathy ([facility]     Hyperlipidemia     Hypertension     Insomnia     Muscle weakness     PVD (peripheral vascular disease) ([facility]     Seizures ([facility]     Vascular dementia     Vitamin D deficiency     Weakness        Past Surgical History:   Procedure Laterality Date    AMPUTATION ABOVE KNEE Right 7/19/2019    RIGHT ABOVE KNEE LEG AMPUTATION performed by Mercy Victoria MD at [facility] ARTERIOGRAM Right 6/11/2019    RIGHT ARM AORTO FEMORAL DIGITAL SUBTRACTION ARTERIOGRAM performed by Mercy Victoria MD at [facility] Right 6/11/2019    REVISION AND REVASCULARIZATION RIGHT LOWER EXTREMITY performed by Mercy Victoria MD at [address] [facility] Right        Prior to Admission medications    Medication Sig Start Date End Date Taking? Authorizing Provider   Lactobacillus (ACIDOPHILUS) CAPS capsule Take 1 capsule by mouth daily   Yes Historical Provider, MD   oxyCODONE-acetaminophen (PERCOCET) 5-325 MG per tablet Take 1 tablet by mouth every 6 hours as needed for Pain.    Yes Historical Provider, MD   vitamin C (ASCORBIC ACID) 500 MG tablet Take 500 mg by mouth daily   Yes Historical Provider, MD   potassium

## 2019-07-19 NOTE — ANESTHESIA POSTPROCEDURE EVALUATION
Department of Anesthesiology  Postprocedure Note    Patient: Arianne Rae  MRN: [id number]  YOB: 1960  Date of evaluation: 7/19/2019  Time:  2:33 PM     Procedure Summary     Date:  07/19/19 Room / Location:  [address]    Anesthesia Start:  1205 Anesthesia Stop:  5535    Procedure:  RIGHT ABOVE KNEE LEG AMPUTATION (Right Thigh) Diagnosis:  (OCCLUSIVE DISEASE)    Surgeon:  Mercy Victoria MD Responsible Provider:  Manjula Mar MD    Anesthesia Type:  general ASA Status:  4          Anesthesia Type: general    Faustina Phase I:      Faustina Phase II:      Last vitals: Reviewed and per EMR flowsheets.        Anesthesia Post Evaluation    Patient location during evaluation: ICU  Patient participation: complete - patient participated  Level of consciousness: awake  Airway patency: patent  Nausea & Vomiting: no nausea and no vomiting  Complications: no  Cardiovascular status: hemodynamically stable  Respiratory status: acceptable  Hydration status: euvolemic

## 2019-07-20 LAB
EKG ATRIAL RATE: 91 BPM
EKG P AXIS: 55 DEGREES
EKG P-R INTERVAL: 158 MS
EKG Q-T INTERVAL: 404 MS
EKG QRS DURATION: 154 MS
EKG QTC CALCULATION (BAZETT): 496 MS
EKG R AXIS: -38 DEGREES
EKG T AXIS: 106 DEGREES
EKG VENTRICULAR RATE: 91 BPM
TROPONIN: <0.01 NG/ML (ref 0–0.01)

## 2019-07-20 PROCEDURE — 6360000002 HC RX W HCPCS: Performed by: THORACIC SURGERY (CARDIOTHORACIC VASCULAR SURGERY)

## 2019-07-20 PROCEDURE — 36592 COLLECT BLOOD FROM PICC: CPT

## 2019-07-20 PROCEDURE — 6370000000 HC RX 637 (ALT 250 FOR IP): Performed by: PHYSICIAN ASSISTANT

## 2019-07-20 PROCEDURE — 2700000000 HC OXYGEN THERAPY PER DAY

## 2019-07-20 PROCEDURE — 2580000003 HC RX 258: Performed by: ANESTHESIOLOGY

## 2019-07-20 PROCEDURE — 84484 ASSAY OF TROPONIN QUANT: CPT

## 2019-07-20 PROCEDURE — 93005 ELECTROCARDIOGRAM TRACING: CPT

## 2019-07-20 PROCEDURE — 2000000000 HC ICU R&B

## 2019-07-20 PROCEDURE — 6370000000 HC RX 637 (ALT 250 FOR IP): Performed by: INTERNAL MEDICINE

## 2019-07-20 PROCEDURE — 2580000003 HC RX 258: Performed by: THORACIC SURGERY (CARDIOTHORACIC VASCULAR SURGERY)

## 2019-07-20 RX ORDER — LOSARTAN POTASSIUM 25 MG/1
25 TABLET ORAL DAILY
Status: DISCONTINUED | OUTPATIENT
Start: 2019-07-20 | End: 2019-07-23 | Stop reason: HOSPADM

## 2019-07-20 RX ORDER — ATORVASTATIN CALCIUM 20 MG/1
20 TABLET, FILM COATED ORAL NIGHTLY
Status: DISCONTINUED | OUTPATIENT
Start: 2019-07-20 | End: 2019-07-23 | Stop reason: HOSPADM

## 2019-07-20 RX ORDER — ASPIRIN 81 MG/1
81 TABLET, CHEWABLE ORAL DAILY
Status: DISCONTINUED | OUTPATIENT
Start: 2019-07-20 | End: 2019-07-23 | Stop reason: HOSPADM

## 2019-07-20 RX ADMIN — PHENYTOIN SODIUM 100 MG: 100 CAPSULE ORAL at 12:04

## 2019-07-20 RX ADMIN — HYDROMORPHONE HYDROCHLORIDE 0.5 MG: 1 INJECTION, SOLUTION INTRAMUSCULAR; INTRAVENOUS; SUBCUTANEOUS at 21:12

## 2019-07-20 RX ADMIN — DOCUSATE SODIUM 100 MG: 100 CAPSULE, LIQUID FILLED ORAL at 09:14

## 2019-07-20 RX ADMIN — DIVALPROEX SODIUM 125 MG: 125 CAPSULE, COATED PELLETS ORAL at 21:12

## 2019-07-20 RX ADMIN — HYDROMORPHONE HYDROCHLORIDE 0.5 MG: 1 INJECTION, SOLUTION INTRAMUSCULAR; INTRAVENOUS; SUBCUTANEOUS at 04:33

## 2019-07-20 RX ADMIN — TRAZODONE HYDROCHLORIDE 50 MG: 50 TABLET ORAL at 21:12

## 2019-07-20 RX ADMIN — HYDROMORPHONE HYDROCHLORIDE 0.5 MG: 1 INJECTION, SOLUTION INTRAMUSCULAR; INTRAVENOUS; SUBCUTANEOUS at 11:33

## 2019-07-20 RX ADMIN — PHENYTOIN SODIUM 200 MG: 100 CAPSULE ORAL at 09:14

## 2019-07-20 RX ADMIN — METOPROLOL SUCCINATE 25 MG: 25 TABLET, EXTENDED RELEASE ORAL at 09:14

## 2019-07-20 RX ADMIN — LACTOBACILLUS TAB 1 TABLET: TAB at 09:14

## 2019-07-20 RX ADMIN — HYDROMORPHONE HYDROCHLORIDE 0.5 MG: 1 INJECTION, SOLUTION INTRAMUSCULAR; INTRAVENOUS; SUBCUTANEOUS at 17:45

## 2019-07-20 RX ADMIN — HYDROMORPHONE HYDROCHLORIDE 0.5 MG: 1 INJECTION, SOLUTION INTRAMUSCULAR; INTRAVENOUS; SUBCUTANEOUS at 07:50

## 2019-07-20 RX ADMIN — PHENYTOIN SODIUM 200 MG: 100 CAPSULE ORAL at 21:12

## 2019-07-20 RX ADMIN — HYDROMORPHONE HYDROCHLORIDE 0.5 MG: 1 INJECTION, SOLUTION INTRAMUSCULAR; INTRAVENOUS; SUBCUTANEOUS at 00:47

## 2019-07-20 RX ADMIN — APIXABAN 5 MG: 5 TABLET, FILM COATED ORAL at 21:12

## 2019-07-20 RX ADMIN — Medication 10 ML: at 09:17

## 2019-07-20 RX ADMIN — OXYCODONE HYDROCHLORIDE AND ACETAMINOPHEN 500 MG: 500 TABLET ORAL at 09:14

## 2019-07-20 RX ADMIN — FUROSEMIDE 40 MG: 40 TABLET ORAL at 09:14

## 2019-07-20 RX ADMIN — DIVALPROEX SODIUM 125 MG: 125 CAPSULE, COATED PELLETS ORAL at 14:09

## 2019-07-20 RX ADMIN — ISOSORBIDE MONONITRATE 30 MG: 30 TABLET, EXTENDED RELEASE ORAL at 09:14

## 2019-07-20 RX ADMIN — HYDROMORPHONE HYDROCHLORIDE 0.5 MG: 1 INJECTION, SOLUTION INTRAMUSCULAR; INTRAVENOUS; SUBCUTANEOUS at 14:35

## 2019-07-20 RX ADMIN — SODIUM CHLORIDE: 9 INJECTION, SOLUTION INTRAVENOUS at 17:45

## 2019-07-20 RX ADMIN — ASPIRIN 81 MG 81 MG: 81 TABLET ORAL at 15:13

## 2019-07-20 RX ADMIN — DIVALPROEX SODIUM 125 MG: 125 CAPSULE, COATED PELLETS ORAL at 09:15

## 2019-07-20 RX ADMIN — VITAMIN D, TAB 1000IU (100/BT) 1000 UNITS: 25 TAB at 09:14

## 2019-07-20 RX ADMIN — Medication 10 ML: at 21:13

## 2019-07-20 RX ADMIN — ZONISAMIDE 100 MG: 100 CAPSULE ORAL at 09:16

## 2019-07-20 RX ADMIN — SODIUM CHLORIDE: 9 INJECTION, SOLUTION INTRAVENOUS at 04:32

## 2019-07-20 RX ADMIN — POTASSIUM CHLORIDE 10 MEQ: 750 CAPSULE, EXTENDED RELEASE ORAL at 09:16

## 2019-07-20 RX ADMIN — APIXABAN 5 MG: 5 TABLET, FILM COATED ORAL at 15:13

## 2019-07-20 RX ADMIN — DULOXETINE HYDROCHLORIDE 30 MG: 30 CAPSULE, DELAYED RELEASE ORAL at 09:14

## 2019-07-20 RX ADMIN — ATORVASTATIN CALCIUM 20 MG: 20 TABLET, FILM COATED ORAL at 21:12

## 2019-07-20 ASSESSMENT — PAIN SCALES - GENERAL
PAINLEVEL_OUTOF10: 8
PAINLEVEL_OUTOF10: 7
PAINLEVEL_OUTOF10: 9
PAINLEVEL_OUTOF10: 10
PAINLEVEL_OUTOF10: 9
PAINLEVEL_OUTOF10: 6
PAINLEVEL_OUTOF10: 5
PAINLEVEL_OUTOF10: 10
PAINLEVEL_OUTOF10: 2
PAINLEVEL_OUTOF10: 0
PAINLEVEL_OUTOF10: 8
PAINLEVEL_OUTOF10: 0
PAINLEVEL_OUTOF10: 10
PAINLEVEL_OUTOF10: 10
PAINLEVEL_OUTOF10: 2
PAINLEVEL_OUTOF10: 0
PAINLEVEL_OUTOF10: 0
PAINLEVEL_OUTOF10: 5
PAINLEVEL_OUTOF10: 5

## 2019-07-20 ASSESSMENT — PAIN DESCRIPTION - LOCATION: LOCATION: LEG

## 2019-07-20 ASSESSMENT — PAIN DESCRIPTION - PAIN TYPE: TYPE: SURGICAL PAIN

## 2019-07-20 ASSESSMENT — PAIN DESCRIPTION - ORIENTATION: ORIENTATION: RIGHT

## 2019-07-20 NOTE — PROGRESS NOTES
intake: 1-25%  · Oral Nutrition Supplement (ONS) Orders: None  · Anthropometric Measures:  · Ht: 5' 2\" (157.5 cm)(noted last admission)   · Current Body Wt:  N/A  · Admission Body Wt: (n/a; 188lb (7-18))  · Usual Body Wt: 207 lb (93.9 kg)(stated)  · % Weight Change:  ,  9% in ~3 weeks per pt  · Adjusted Ideal Body Wt: 100 lb (45.4 kg), % Ideal Body >100%  · body weight adjusted for AKA  · BMI Classification: BMI 30.0 - 34.9 Obese Class I    Nutrition Interventions:   Start ONS, Continue current diet(provide diabetic supplement tid)  Continued Inpatient Monitoring    Nutrition Evaluation:   · Evaluation: Goals set   · Goals: Intake >75% of meals.     · Monitoring: Meal Intake, Supplement Intake, Weight, Pertinent Labs, Skin Integrity      Electronically signed by Kristina Webb RD, LD on 7/20/19 at 2:19 PM

## 2019-07-20 NOTE — CONSULTS
Cardiology Consult Note      Patient:  Robert Garland  YOB: 1960  MRN: [id number]   Acct: [de-identified]  Admit Date:  7/19/2019  Primary Cardiologist:No regular cardiology follow up  Consult Cardiologist: Dr. Drinda Cockayne  Reason for consultation: Chest pain, tachycardia  Requested by: Dr. Ritchie Frame  Time of consult: 7/20/2019  1316 hours      Impression/Plan:  1. Chest discomfort: Very short-lived episode of chest pain. She does have history of normal coronaries but 6 years ago and she has diabetes and chronic tobacco abuse. Progression is very likely. Duration of discomfort seems rather short hands this would be considered atypical chest pain but given her very high risk would proceed with at this point noninvasive assessment with serial cardiac enzymes and myocardial perfusion imaging this coming Monday. Aggressive approach may be necessary depending upon symptoms over the next 48 hours. Needs to be on aspirin and statin therapy. 2. Paroxysmal atrial fibrillation: I do not have records of her stroke but I am concerned that it may have been embolic. She is a very high risk of embolic phenomenon at this time and requires full anticoagulation. I discussed this with Dr. Jennifer Rogers her surgeon who believes it reasonable to start full anticoagulation at this time. To new to monitor to assess A. fib burden. No clear indication for antiarrhythmic or A. fib ablation as of yet. Will obtain echocardiogram as well  3. Hypertension: With diabetes she should be on AR-2 blocker depending on blood pressure. He had been on Lasix Imdur and metoprolol apparently for blood pressure. Will optimize BP meds here in the hospital.  In light of the fact she had rapid atrial fibrillation we will continue metoprolol but utilize AR-2 blocker as opposed to Lasix Imdur. 4. Bundle branch block: Has been documented for over 6 years on review of previous records  5. biLateral carotid bruits: Obtain duplex  6.  Hyperlipidemia: droop. Rt yudi par    Medications:    sodium chloride flush  10 mL Intravenous 2 times per day    atorvastatin  10 mg Oral Nightly    divalproex  125 mg Oral TID    docusate sodium  100 mg Oral QAM    DULoxetine  30 mg Oral Daily    furosemide  40 mg Oral Daily    isosorbide mononitrate  30 mg Oral Daily    lactobacillus acidophilus  1 tablet Oral Daily    metoprolol succinate  25 mg Oral Daily    phenytoin  100 mg Oral Lunch    phenytoin  200 mg Oral BID    potassium chloride  10 mEq Oral Daily    traZODone  50 mg Oral Nightly    vitamin C  500 mg Oral Daily    vitamin D  1,000 Units Oral Daily    zonisamide  100 mg Oral Daily      sodium chloride 100 mL/hr at 07/20/19 0432       sodium chloride flush 10 mL PRN   ondansetron 4 mg Q6H PRN   HYDROmorphone 0.5 mg Q3H PRN   ipratropium-albuterol 1 vial Q8H PRN   magnesium hydroxide 30 mL Daily PRN       Diagnostics:  EKG:  NSR LBBB    Echo: to be ordered      Stress: to be ordered    Lab Data:    Cardiac Enzymes:  No results for input(s): CKTOTAL, CKMB, CKMBINDEX, TROPONINI in the last 72 hours.   ProBNP: No results found for: PROBNP    CBC:   Lab Results   Component Value Date    WBC 6.6 07/18/2019    RBC 3.49 07/18/2019    RBC 4.00 12/28/2011    HGB 11.8 07/18/2019    HCT 34.6 07/18/2019     07/18/2019       CMP:  Lab Results   Component Value Date     07/18/2019    K 4.3 07/18/2019    K 3.3 06/14/2019     07/18/2019    CO2 24 07/18/2019    BUN 12 07/18/2019    CREATININE 0.65 07/18/2019    GFRAA >60.0 07/18/2019    LABGLOM >60.0 07/18/2019    GLUCOSE 108 07/18/2019    GLUCOSE 94 12/28/2011    CALCIUM 9.3 07/18/2019       Hepatic Function Panel:  Lab Results   Component Value Date    ALKPHOS 143 07/18/2019    ALT 8 07/18/2019    AST 12 07/18/2019    PROT 7.3 07/18/2019    BILITOT <0.2 07/18/2019    LABALBU 3.7 07/18/2019    LABALBU 3.9 12/28/2011       Magnesium:    Lab Results   Component Value Date    MG 2.3 06/14/2019

## 2019-07-20 NOTE — PROGRESS NOTES
2055 patient took pills whole no ease. AxO x 3. Right stump site is covered with surgical Aquacel dressing shadowing at inferior side of dressing. Will continue to monitor. Oral fluids encouraged. 0200 patient HR on monitor was 136-140 ST. Patient was lying in bed , grunting, c/o chest pain isolated over left side. EKG done per chest pain protocol. Dr. Paulette Harris called instructed to \" leave her alone\".

## 2019-07-20 NOTE — PROGRESS NOTES
Hospitalist Progress Note      PCP: Krishna Garg MD    Date of Admission: 7/19/2019    Chief Complaint:    No chief complaint on file. Subjective:  Patient denies fevers, chills, sweats, CP, SOB, diarrhea, burning micturition. 12 point ROS negative other than mentioned above     Medications:  Reviewed    Infusion Medications    sodium chloride 100 mL/hr at 07/20/19 4295     Scheduled Medications    sodium chloride flush  10 mL Intravenous 2 times per day    atorvastatin  10 mg Oral Nightly    divalproex  125 mg Oral TID    docusate sodium  100 mg Oral QAM    DULoxetine  30 mg Oral Daily    furosemide  40 mg Oral Daily    isosorbide mononitrate  30 mg Oral Daily    lactobacillus acidophilus  1 tablet Oral Daily    metoprolol succinate  25 mg Oral Daily    phenytoin  100 mg Oral Lunch    phenytoin  200 mg Oral BID    potassium chloride  10 mEq Oral Daily    traZODone  50 mg Oral Nightly    vitamin C  500 mg Oral Daily    vitamin D  1,000 Units Oral Daily    zonisamide  100 mg Oral Daily     PRN Meds: sodium chloride flush, ondansetron, HYDROmorphone, ipratropium-albuterol, magnesium hydroxide      Intake/Output Summary (Last 24 hours) at 7/20/2019 1400  Last data filed at 7/20/2019 1200  Gross per 24 hour   Intake 4188.5 ml   Output 2700 ml   Net 1488.5 ml     Exam:    BP (!) 97/54   Pulse 74   Temp 98.4 °F (36.9 °C) (Oral)   Resp 13   SpO2 98%     General appearance: No apparent distress, appears stated age and cooperative. HEENT:  Conjunctivae/corneas clear. Neck:  Trachea midline. Respiratory:  Normal respiratory effort. Clear to auscultation  Cardiovascular: Regular rate and rhythm   Abdomen: Soft, non-tender, non-distended with normal bowel sounds.   Musculoskeletal: Right AKA   Neuro: Non Focal.   Capillary Refill: Brisk,< 3 seconds   Peripheral Pulses: +2 palpable, equal bilaterally     Labs:   Recent Labs     07/18/19  1500   WBC 6.6   HGB 11.8*   HCT 34.6*        Recent Labs

## 2019-07-21 PROBLEM — I48.0 PAROXYSMAL ATRIAL FIBRILLATION (HCC): Status: ACTIVE | Noted: 2019-07-21

## 2019-07-21 PROBLEM — E11.9 DIABETES MELLITUS (HCC): Status: ACTIVE | Noted: 2019-07-21

## 2019-07-21 PROBLEM — I10 HYPERTENSION: Status: ACTIVE | Noted: 2019-07-21

## 2019-07-21 LAB
ANION GAP SERPL CALCULATED.3IONS-SCNC: 7 MEQ/L (ref 9–15)
BUN BLDV-MCNC: 4 MG/DL (ref 6–20)
CALCIUM SERPL-MCNC: 8.1 MG/DL (ref 8.5–9.9)
CHLORIDE BLD-SCNC: 111 MEQ/L (ref 95–107)
CO2: 25 MEQ/L (ref 20–31)
CREAT SERPL-MCNC: 0.39 MG/DL (ref 0.5–0.9)
GFR AFRICAN AMERICAN: >60
GFR NON-AFRICAN AMERICAN: >60
GLUCOSE BLD-MCNC: 98 MG/DL (ref 70–99)
HCT VFR BLD CALC: 26 % (ref 37–47)
HEMOGLOBIN: 8.9 G/DL (ref 12–16)
POTASSIUM SERPL-SCNC: 3.8 MEQ/L (ref 3.4–4.9)
SODIUM BLD-SCNC: 143 MEQ/L (ref 135–144)
TROPONIN: <0.01 NG/ML (ref 0–0.01)

## 2019-07-21 PROCEDURE — 2580000003 HC RX 258: Performed by: ANESTHESIOLOGY

## 2019-07-21 PROCEDURE — 6370000000 HC RX 637 (ALT 250 FOR IP): Performed by: INTERNAL MEDICINE

## 2019-07-21 PROCEDURE — 6370000000 HC RX 637 (ALT 250 FOR IP): Performed by: PHYSICIAN ASSISTANT

## 2019-07-21 PROCEDURE — 2580000003 HC RX 258: Performed by: THORACIC SURGERY (CARDIOTHORACIC VASCULAR SURGERY)

## 2019-07-21 PROCEDURE — 6360000002 HC RX W HCPCS: Performed by: THORACIC SURGERY (CARDIOTHORACIC VASCULAR SURGERY)

## 2019-07-21 PROCEDURE — 2700000000 HC OXYGEN THERAPY PER DAY

## 2019-07-21 PROCEDURE — 85018 HEMOGLOBIN: CPT

## 2019-07-21 PROCEDURE — 2000000000 HC ICU R&B

## 2019-07-21 PROCEDURE — 85014 HEMATOCRIT: CPT

## 2019-07-21 PROCEDURE — 6370000000 HC RX 637 (ALT 250 FOR IP): Performed by: THORACIC SURGERY (CARDIOTHORACIC VASCULAR SURGERY)

## 2019-07-21 PROCEDURE — 80048 BASIC METABOLIC PNL TOTAL CA: CPT

## 2019-07-21 RX ORDER — OXYCODONE HYDROCHLORIDE AND ACETAMINOPHEN 5; 325 MG/1; MG/1
1 TABLET ORAL EVERY 4 HOURS PRN
Status: DISCONTINUED | OUTPATIENT
Start: 2019-07-21 | End: 2019-07-23 | Stop reason: HOSPADM

## 2019-07-21 RX ADMIN — OXYCODONE HYDROCHLORIDE AND ACETAMINOPHEN 1 TABLET: 5; 325 TABLET ORAL at 08:20

## 2019-07-21 RX ADMIN — DIVALPROEX SODIUM 125 MG: 125 CAPSULE, COATED PELLETS ORAL at 09:10

## 2019-07-21 RX ADMIN — PHENYTOIN SODIUM 200 MG: 100 CAPSULE ORAL at 20:52

## 2019-07-21 RX ADMIN — ASPIRIN 81 MG 81 MG: 81 TABLET ORAL at 09:10

## 2019-07-21 RX ADMIN — APIXABAN 5 MG: 5 TABLET, FILM COATED ORAL at 09:10

## 2019-07-21 RX ADMIN — APIXABAN 5 MG: 5 TABLET, FILM COATED ORAL at 20:54

## 2019-07-21 RX ADMIN — LACTOBACILLUS TAB 1 TABLET: TAB at 09:10

## 2019-07-21 RX ADMIN — OXYCODONE HYDROCHLORIDE AND ACETAMINOPHEN 1 TABLET: 5; 325 TABLET ORAL at 23:14

## 2019-07-21 RX ADMIN — ATORVASTATIN CALCIUM 20 MG: 20 TABLET, FILM COATED ORAL at 20:52

## 2019-07-21 RX ADMIN — DULOXETINE HYDROCHLORIDE 30 MG: 30 CAPSULE, DELAYED RELEASE ORAL at 09:11

## 2019-07-21 RX ADMIN — PHENYTOIN SODIUM 200 MG: 100 CAPSULE ORAL at 09:10

## 2019-07-21 RX ADMIN — SODIUM CHLORIDE: 9 INJECTION, SOLUTION INTRAVENOUS at 16:28

## 2019-07-21 RX ADMIN — DIVALPROEX SODIUM 125 MG: 125 CAPSULE, COATED PELLETS ORAL at 20:52

## 2019-07-21 RX ADMIN — HYDROMORPHONE HYDROCHLORIDE 0.5 MG: 1 INJECTION, SOLUTION INTRAMUSCULAR; INTRAVENOUS; SUBCUTANEOUS at 01:25

## 2019-07-21 RX ADMIN — Medication 10 ML: at 20:54

## 2019-07-21 RX ADMIN — Medication 10 ML: at 09:14

## 2019-07-21 RX ADMIN — VITAMIN D, TAB 1000IU (100/BT) 1000 UNITS: 25 TAB at 09:10

## 2019-07-21 RX ADMIN — OXYCODONE HYDROCHLORIDE AND ACETAMINOPHEN 1 TABLET: 5; 325 TABLET ORAL at 12:52

## 2019-07-21 RX ADMIN — POTASSIUM CHLORIDE 10 MEQ: 750 CAPSULE, EXTENDED RELEASE ORAL at 09:10

## 2019-07-21 RX ADMIN — PHENYTOIN SODIUM 100 MG: 100 CAPSULE ORAL at 12:52

## 2019-07-21 RX ADMIN — OXYCODONE HYDROCHLORIDE AND ACETAMINOPHEN 500 MG: 500 TABLET ORAL at 09:10

## 2019-07-21 RX ADMIN — OXYCODONE HYDROCHLORIDE AND ACETAMINOPHEN 1 TABLET: 5; 325 TABLET ORAL at 17:11

## 2019-07-21 RX ADMIN — DOCUSATE SODIUM 100 MG: 100 CAPSULE, LIQUID FILLED ORAL at 09:10

## 2019-07-21 RX ADMIN — ZONISAMIDE 100 MG: 100 CAPSULE ORAL at 09:11

## 2019-07-21 RX ADMIN — DIVALPROEX SODIUM 125 MG: 125 CAPSULE, COATED PELLETS ORAL at 12:52

## 2019-07-21 ASSESSMENT — PAIN DESCRIPTION - ONSET: ONSET: ON-GOING

## 2019-07-21 ASSESSMENT — PAIN SCALES - GENERAL
PAINLEVEL_OUTOF10: 10
PAINLEVEL_OUTOF10: 8
PAINLEVEL_OUTOF10: 10

## 2019-07-21 ASSESSMENT — PAIN DESCRIPTION - PAIN TYPE: TYPE: SURGICAL PAIN

## 2019-07-21 NOTE — PROGRESS NOTES
Labs:   Recent Labs     07/18/19  1500 07/21/19  0600   WBC 6.6  --    HGB 11.8* 8.9*   HCT 34.6* 26.0*     --      Recent Labs     07/18/19  1500 07/21/19  0600    143   K 4.3 3.8    111*   CO2 24 25   BUN 12 4*   CREATININE 0.65 0.39*   CALCIUM 9.3 8.1*     Recent Labs     07/18/19  1500   AST 12   ALT 8   BILITOT <0.2   ALKPHOS 143*     No results for input(s): INR in the last 72 hours. Recent Labs     07/20/19  1508 07/20/19  2030   TROPONINI <0.010 <0.010       Urinalysis:      Lab Results   Component Value Date    NITRU Negative 07/18/2019    WBCUA 0-2 12/20/2016    BACTERIA Many 12/20/2016    RBCUA 0-2 12/20/2016    BLOODU Negative 07/18/2019    SPECGRAV 1.013 07/18/2019    GLUCOSEU Negative 07/18/2019     Radiology:  XR CHEST PORTABLE   Final Result      No acute intrathoracic process. NM MYOCARDIAL SPECT REST EXERCISE OR RX    (Results Pending)     Assessment/Plan:    #Right AKA - management, DVT Ppx and pain management per primary team  #HTN:  Continue home meds  #DMII:  SSI  #Chest pain:  Plan for Fer Donny in the AM  #PAF:  Per caridolog    Active Hospital Problems    Diagnosis Date Noted    Hypertension [I10] 07/21/2019     Priority: High    Paroxysmal atrial fibrillation ([facility] [I48.0] 07/21/2019     Priority: High    Diabetes mellitus ([facility] [address] [E11.9] 07/21/2019    PAD (peripheral artery disease) ([facility] [address] [I73.9] 07/19/2019     Additional work up or/and treatment plan may be added today or then after based on clinical progression. I am managing a portion of pt care. Some medical issues are handled by other specialists. Additional work up and treatment should be done in out pt setting by pt PCP and other out pt providers. In addition to examining and evaluating pt, I spent additional time explaining care, normal and abnormal findings, and treatment plan. All of pt questions were answered. Counseling, diet and education were  provided.  Case will be discussed with nursing

## 2019-07-21 NOTE — PROGRESS NOTES
Pt assessment complete: Pt in noticeable pain, rates it 15/10. Call placed to Dr Alexandrea Cordoba. Orders received .

## 2019-07-22 ENCOUNTER — APPOINTMENT (OUTPATIENT)
Dept: NUCLEAR MEDICINE | Age: 59
DRG: 305 | End: 2019-07-22
Attending: THORACIC SURGERY (CARDIOTHORACIC VASCULAR SURGERY)
Payer: MEDICAID

## 2019-07-22 LAB
ANION GAP SERPL CALCULATED.3IONS-SCNC: 9 MEQ/L (ref 9–15)
BUN BLDV-MCNC: 4 MG/DL (ref 6–20)
CALCIUM SERPL-MCNC: 8 MG/DL (ref 8.5–9.9)
CHLORIDE BLD-SCNC: 112 MEQ/L (ref 95–107)
CO2: 23 MEQ/L (ref 20–31)
CREAT SERPL-MCNC: 0.38 MG/DL (ref 0.5–0.9)
GFR AFRICAN AMERICAN: >60
GFR NON-AFRICAN AMERICAN: >60
GLUCOSE BLD-MCNC: 120 MG/DL (ref 70–99)
HCT VFR BLD CALC: 25.7 % (ref 37–47)
HEMOGLOBIN: 8.9 G/DL (ref 12–16)
LV EF: 65 %
LVEF MODALITY: NORMAL
POTASSIUM SERPL-SCNC: 3.4 MEQ/L (ref 3.4–4.9)
SODIUM BLD-SCNC: 144 MEQ/L (ref 135–144)

## 2019-07-22 PROCEDURE — 2000000000 HC ICU R&B

## 2019-07-22 PROCEDURE — 2580000003 HC RX 258: Performed by: ANESTHESIOLOGY

## 2019-07-22 PROCEDURE — 6360000002 HC RX W HCPCS: Performed by: INTERNAL MEDICINE

## 2019-07-22 PROCEDURE — 2580000003 HC RX 258: Performed by: THORACIC SURGERY (CARDIOTHORACIC VASCULAR SURGERY)

## 2019-07-22 PROCEDURE — 97166 OT EVAL MOD COMPLEX 45 MIN: CPT

## 2019-07-22 PROCEDURE — 2700000000 HC OXYGEN THERAPY PER DAY

## 2019-07-22 PROCEDURE — 80048 BASIC METABOLIC PNL TOTAL CA: CPT

## 2019-07-22 PROCEDURE — 78452 HT MUSCLE IMAGE SPECT MULT: CPT

## 2019-07-22 PROCEDURE — 2580000003 HC RX 258: Performed by: INTERNAL MEDICINE

## 2019-07-22 PROCEDURE — 85014 HEMATOCRIT: CPT

## 2019-07-22 PROCEDURE — 93306 TTE W/DOPPLER COMPLETE: CPT

## 2019-07-22 PROCEDURE — 3430000000 HC RX DIAGNOSTIC RADIOPHARMACEUTICAL: Performed by: INTERNAL MEDICINE

## 2019-07-22 PROCEDURE — A9502 TC99M TETROFOSMIN: HCPCS | Performed by: INTERNAL MEDICINE

## 2019-07-22 PROCEDURE — 93017 CV STRESS TEST TRACING ONLY: CPT

## 2019-07-22 PROCEDURE — 6370000000 HC RX 637 (ALT 250 FOR IP): Performed by: INTERNAL MEDICINE

## 2019-07-22 PROCEDURE — 6370000000 HC RX 637 (ALT 250 FOR IP): Performed by: PHYSICIAN ASSISTANT

## 2019-07-22 PROCEDURE — 97163 PT EVAL HIGH COMPLEX 45 MIN: CPT

## 2019-07-22 PROCEDURE — 6370000000 HC RX 637 (ALT 250 FOR IP): Performed by: THORACIC SURGERY (CARDIOTHORACIC VASCULAR SURGERY)

## 2019-07-22 PROCEDURE — 93010 ELECTROCARDIOGRAM REPORT: CPT | Performed by: INTERNAL MEDICINE

## 2019-07-22 PROCEDURE — 85018 HEMOGLOBIN: CPT

## 2019-07-22 RX ORDER — SODIUM CHLORIDE 0.9 % (FLUSH) 0.9 %
10 SYRINGE (ML) INJECTION PRN
Status: DISCONTINUED | OUTPATIENT
Start: 2019-07-22 | End: 2019-07-23 | Stop reason: HOSPADM

## 2019-07-22 RX ADMIN — DIVALPROEX SODIUM 125 MG: 125 CAPSULE, COATED PELLETS ORAL at 12:37

## 2019-07-22 RX ADMIN — POTASSIUM CHLORIDE 10 MEQ: 750 CAPSULE, EXTENDED RELEASE ORAL at 12:40

## 2019-07-22 RX ADMIN — OXYCODONE HYDROCHLORIDE AND ACETAMINOPHEN 1 TABLET: 5; 325 TABLET ORAL at 16:38

## 2019-07-22 RX ADMIN — OXYCODONE HYDROCHLORIDE AND ACETAMINOPHEN 1 TABLET: 5; 325 TABLET ORAL at 12:28

## 2019-07-22 RX ADMIN — OXYCODONE HYDROCHLORIDE AND ACETAMINOPHEN 500 MG: 500 TABLET ORAL at 12:38

## 2019-07-22 RX ADMIN — LOSARTAN POTASSIUM 25 MG: 25 TABLET ORAL at 12:39

## 2019-07-22 RX ADMIN — VITAMIN D, TAB 1000IU (100/BT) 1000 UNITS: 25 TAB at 12:39

## 2019-07-22 RX ADMIN — SODIUM CHLORIDE: 9 INJECTION, SOLUTION INTRAVENOUS at 22:49

## 2019-07-22 RX ADMIN — PHENYTOIN SODIUM 200 MG: 100 CAPSULE ORAL at 20:38

## 2019-07-22 RX ADMIN — Medication 10 ML: at 10:18

## 2019-07-22 RX ADMIN — PHENYTOIN SODIUM 100 MG: 100 CAPSULE ORAL at 12:39

## 2019-07-22 RX ADMIN — Medication 10 ML: at 10:17

## 2019-07-22 RX ADMIN — TETROFOSMIN 31.1 MILLICURIE: 1.38 INJECTION, POWDER, LYOPHILIZED, FOR SOLUTION INTRAVENOUS at 10:17

## 2019-07-22 RX ADMIN — LACTOBACILLUS TAB 1 TABLET: TAB at 12:38

## 2019-07-22 RX ADMIN — APIXABAN 5 MG: 5 TABLET, FILM COATED ORAL at 20:38

## 2019-07-22 RX ADMIN — DULOXETINE HYDROCHLORIDE 30 MG: 30 CAPSULE, DELAYED RELEASE ORAL at 12:38

## 2019-07-22 RX ADMIN — TETROFOSMIN 11.9 MILLICURIE: 1.38 INJECTION, POWDER, LYOPHILIZED, FOR SOLUTION INTRAVENOUS at 08:29

## 2019-07-22 RX ADMIN — Medication 10 ML: at 08:29

## 2019-07-22 RX ADMIN — TRAZODONE HYDROCHLORIDE 50 MG: 50 TABLET ORAL at 20:38

## 2019-07-22 RX ADMIN — DOCUSATE SODIUM 100 MG: 100 CAPSULE, LIQUID FILLED ORAL at 12:38

## 2019-07-22 RX ADMIN — ASPIRIN 81 MG 81 MG: 81 TABLET ORAL at 12:38

## 2019-07-22 RX ADMIN — Medication 10 ML: at 12:43

## 2019-07-22 RX ADMIN — METOPROLOL SUCCINATE 25 MG: 25 TABLET, EXTENDED RELEASE ORAL at 12:40

## 2019-07-22 RX ADMIN — ZONISAMIDE 100 MG: 100 CAPSULE ORAL at 12:42

## 2019-07-22 RX ADMIN — APIXABAN 5 MG: 5 TABLET, FILM COATED ORAL at 12:38

## 2019-07-22 RX ADMIN — REGADENOSON 0.4 MG: 0.08 INJECTION, SOLUTION INTRAVENOUS at 10:17

## 2019-07-22 RX ADMIN — OXYCODONE HYDROCHLORIDE AND ACETAMINOPHEN 1 TABLET: 5; 325 TABLET ORAL at 20:38

## 2019-07-22 RX ADMIN — ATORVASTATIN CALCIUM 20 MG: 20 TABLET, FILM COATED ORAL at 20:38

## 2019-07-22 RX ADMIN — SODIUM CHLORIDE: 9 INJECTION, SOLUTION INTRAVENOUS at 16:44

## 2019-07-22 RX ADMIN — OXYCODONE HYDROCHLORIDE AND ACETAMINOPHEN 1 TABLET: 5; 325 TABLET ORAL at 04:01

## 2019-07-22 RX ADMIN — Medication 10 ML: at 20:38

## 2019-07-22 RX ADMIN — DIVALPROEX SODIUM 125 MG: 125 CAPSULE, COATED PELLETS ORAL at 20:38

## 2019-07-22 RX ADMIN — SODIUM CHLORIDE: 9 INJECTION, SOLUTION INTRAVENOUS at 01:03

## 2019-07-22 RX ADMIN — OXYCODONE HYDROCHLORIDE AND ACETAMINOPHEN 1 TABLET: 5; 325 TABLET ORAL at 08:25

## 2019-07-22 ASSESSMENT — PAIN SCALES - GENERAL
PAINLEVEL_OUTOF10: 0
PAINLEVEL_OUTOF10: 9
PAINLEVEL_OUTOF10: 10
PAINLEVEL_OUTOF10: 0
PAINLEVEL_OUTOF10: 10
PAINLEVEL_OUTOF10: 0
PAINLEVEL_OUTOF10: 0
PAINLEVEL_OUTOF10: 10
PAINLEVEL_OUTOF10: 8
PAINLEVEL_OUTOF10: 10

## 2019-07-22 ASSESSMENT — PAIN DESCRIPTION - ONSET: ONSET: ON-GOING

## 2019-07-22 ASSESSMENT — PAIN DESCRIPTION - LOCATION: LOCATION: LEG

## 2019-07-22 ASSESSMENT — PAIN DESCRIPTION - ORIENTATION
ORIENTATION: RIGHT
ORIENTATION: RIGHT

## 2019-07-22 ASSESSMENT — PAIN DESCRIPTION - PAIN TYPE: TYPE: SURGICAL PAIN

## 2019-07-22 ASSESSMENT — PAIN SCALES - WONG BAKER: WONGBAKER_NUMERICALRESPONSE: 8

## 2019-07-22 NOTE — PROGRESS NOTES
+2 palpable, equal bilaterally     Labs:   Recent Labs     07/21/19  0600 07/22/19  0600   HGB 8.9* 8.9*   HCT 26.0* 25.7*     Recent Labs     07/21/19  0600 07/22/19  0600    144   K 3.8 3.4   * 112*   CO2 25 23   BUN 4* 4*   CREATININE 0.39* 0.38*   CALCIUM 8.1* 8.0*     No results for input(s): AST, ALT, BILIDIR, BILITOT, ALKPHOS in the last 72 hours. No results for input(s): INR in the last 72 hours. Recent Labs     07/20/19  1508 07/20/19  2030   TROPONINI <0.010 <0.010       Urinalysis:      Lab Results   Component Value Date    NITRU Negative 07/18/2019    WBCUA 0-2 12/20/2016    BACTERIA Many 12/20/2016    RBCUA 0-2 12/20/2016    BLOODU Negative 07/18/2019    SPECGRAV 1.013 07/18/2019    GLUCOSEU Negative 07/18/2019     Radiology:  XR CHEST PORTABLE   Final Result      No acute intrathoracic process. NM MYOCARDIAL SPECT REST EXERCISE OR RX    (Results Pending)     Assessment/Plan:    #Right AKA - management, DVT Ppx and pain management per primary team  #HTN:  Continue home meds  #DMII:  SSI  #Chest pain:  Plan for Napolean Mantis today  #PAF:  Per [facility]    Active Hospital Problems    Diagnosis Date Noted    Hypertension [I10] 07/21/2019     Priority: High    Paroxysmal atrial fibrillation ([facility] [address] [I48.0] 07/21/2019     Priority: High    Diabetes mellitus ([facility] [address] [E11.9] 07/21/2019    PAD (peripheral artery disease) ([facility] [address] [I73.9] 07/19/2019     Additional work up or/and treatment plan may be added today or then after based on clinical progression. I am managing a portion of pt care. Some medical issues are handled by other specialists. Additional work up and treatment should be done in out pt setting by pt PCP and other out pt providers. In addition to examining and evaluating pt, I spent additional time explaining care, normal and abnormal findings, and treatment plan. All of pt questions were answered. Counseling, diet and education were  provided.  Case will be discussed with nursing staff when appropriate. Family will be updated if and when appropriate.       Diet: DIET GENERAL;  Dietary Nutrition Supplements: Diabetic Oral Supplement    Code Status: Full Code    PT/OT Eval     Electronically signed by Sachin Limon MD on 7/22/2019 at 1:24 PM

## 2019-07-22 NOTE — PROGRESS NOTES
Assessment complete. Patient c/o pain. Unable to give pain medication at this time. Patient BP is too low. NS infusing at 100/hr. Patient incontinent of urine. Changed bed and cleaned patient. Applied new brief and purwick. Leg elevated on pillow. Patients BP coming up. . Bed alarm on. Call light at side.

## 2019-07-22 NOTE — PROGRESS NOTES
0830 am assessment completed as noted. Pt. Medicated with percocet for  thobbing pain in right aka. Electronically signed by Juana Parikh RN on 7/22/2019 at 10:14 AM  0900 pt left via cart to stress lab.  Electronically signed by Juana Parikh RN on 7/22/2019 at 10:14 AM   Approximately 1815 Dr. Reza Sue rounded and spoke with pt about results of stress test. Electronically signed by Juana Parikh RN on 7/22/2019 at 6:39 PM

## 2019-07-22 NOTE — PROGRESS NOTES
pain  Pain Location: (R AKA)  Pain Orientation: Right       Prior Level of Function:  Social/Functional History  Lives With: Alone  Type of Home: Facility  Home Layout: One level  Home Access: Level entry  Bathroom Shower/Tub: Walk-in shower  Bathroom Toilet: Standard  Bathroom Equipment: Grab bars in shower, Grab bars around toilet  Bathroom Accessibility: Wheelchair accessible  Home Equipment: Girard Waleska Help From: Other (comment)(living facility)  ADL Assistance: [address]: (provided by facility)  Homemaking Responsibilities: No  Ambulation Assistance: (does not ambulate, uses WC for mobility per pt report)  Transfer Assistance: Independent  Active : No  Additional Comments: Pt questionable historian, is unclear of timeline of how long she has been at [facility] or when her initiatal BKA was. Per chart, has been a few years since amputation and pt has been [facility]-bound since, reports she is indep with transfers and self-care at [facility]: Pt had difficulty recalling past events leading up to stay. Pt had difficulty recalling biogrpahical information, poor historian. Plan is to sdend pt back to [email or web address] for rehab. Orientation Status:  Orientation  Overall Orientation Status: Impaired  Orientation Level: Oriented to place, Oriented to person    Observation:  Observation/Palpation  Posture: Fair  Observation:  R AKA    Cognition Status:  Cognition  Overall Cognitive Status: Exceptions  Arousal/Alertness: Delayed responses to stimuli  Following Commands:  Follows one step commands with increased time  Attention Span: Appears intact  Memory: Decreased recall of recent events, Decreased recall of biographical Information, Decreased short term memory  Safety Judgement: Decreased awareness of need for assistance  Problem Solving: Decreased awareness of errors, Assistance required to generate solutions  Insights: Decreased awareness of deficits  Initiation: Does not require cues  Sequencing: Requires cues for some    Perception Status:  Perception  Overall Perceptual Status: WFL    Sensation Status:  Sensation  Overall Sensation Status: WFL    Vision and Hearing Status:  Vision  Vision: Impaired  Vision Exceptions: Wears glasses for reading, Wears glasses for distance  Hearing  Hearing: Within functional limits     ROM:   LUE AROM (degrees)  LUE AROM : WFL  Left Hand AROM (degrees)  Left Hand AROM: WFL  RUE AROM (degrees)  RUE AROM : WFL  Right Hand AROM (degrees)  Right Hand AROM: WFL    Strength:  LUE Strength  L Hand General: 5/5  RUE Strength  R Hand General: 5/5    Coordination, Tone, Quality of Movement: Tone RUE  RUE Tone: Normotonic  Tone LUE  LUE Tone: Normotonic  Coordination  Movements Are Fluid And Coordinated: No  Coordination and Movement description: Decreased speed, Decreased accuracy    Hand Dominance:  Hand Dominance  Hand Dominance: Right    ADL Status:  ADL  Feeding: Setup  Grooming: Setup  UE Bathing: Minimal assistance  LE Bathing: Maximum assistance  UE Dressing: Minimal assistance  LE Dressing: Dependent/Total  Toileting: Unable to assess(comment)(MILLA)  Additional Comments: Simulated at EOB- pt in increased pain, requiring assistance to don sock.   Toilet Transfers  Toilet Transfer: Unable to assess       Functional Mobility:     Transfers  Sit to stand: Unable to assess  Stand to sit: Unable to assess  Transfer Comments: did not assess due to safety/impulsiveness    Bed Mobility  Bed mobility  Supine to Sit: Minimal assistance  Sit to Supine: Minimal assistance  Comment: pt required increased time due to RLE pain    Seated and Standing Balance:  Balance  Sitting Balance: Modified independent (pt reached in all directions and increased speed without LOS)  Standing Balance: Unable to assess(comment)    Functional Endurance:  Activity Tolerance  Activity Tolerance: Patient limited by pain, Patient Tolerated treatment

## 2019-07-22 NOTE — PROGRESS NOTES
VascularProgress Note    POD #  3    Patient is Cherie Chatman. Subjective: Pain management is being addressed with Percocet    Objective: Cardiac W/U underway and results of Perfusion study are noted. Incisions: Clean dry and intact. No hematoma or bleeding noted. Plan: After concluding cardiac workup, we will transfer back to NH.     Electronically signed by Darryl Shine MD on 7/22/2019 at 4:20 PM

## 2019-07-22 NOTE — PROGRESS NOTES
Physical Therapy Med Surg Initial Assessment  Facility/Department: [facility] [place] [place]  Room: [place] [address]       NAME: Vira Hameed  : 1960 (41 y.o.)  MRN: [id number]  CODE STATUS: Full Code    Date of Service: 2019    Patient Diagnosis(es): PAD (peripheral artery disease) ([address] [I73.9]   No chief complaint on file.     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 ([address] 2019    Wound infection 2019    Dementia with aggressive behavior 01/15/2019    Lethargy 2016    Ischemia of lower extremity 2013    Cerebrovascular accident, old 2013    Angiopathy, peripheral ([address] 2013    Carotid artery disease ([address] 2013    History of amputation of lower extremity through tibia and fibula ([address] 10/14/2013    Deep vein thrombosis (DVT) of lower extremity ([address] 2013    Noncompliance with treatment 10/14/2009        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     Athscl heart disease of native coronary artery w/o ang pctrs     CAD (coronary artery disease)     Cancer (HCC)     right BKA    Constipation     Constipation     Depression     MAJOR DEPRESSIVE DISORDER    Diabetes mellitus (HCC)     TYPE II    Diabetic neuropathy (HCC)     Hyperlipidemia     Hypertension     Insomnia     Muscle weakness     PVD (peripheral vascular disease) (HCC)     Seizures ([address]     Vascular dementia     Vitamin D deficiency     Weakness      Past Surgical History:   Procedure

## 2019-07-22 NOTE — PROCEDURES
[address]                      [address], [address] [facility]                              CARDIAC STRESS TEST    PATIENT NAME: Jo Truong                   :        1960  MED REC NO:   [id number]                            ROOM:       [facility]  ACCOUNT NO:   [de-identified]                           ADMIT DATE: 2019  PROVIDER:     Ankit Gonzáles MD    CARDIOVASCULAR DIAGNOSTIC DEPARTMENT    DATE OF STUDY:  2019    LEXISCAN PERFUSION STUDY    INDICATIONS:  Chest discomfort, high-risk for coronary artery disease. RESTING ECG:  Normal sinus rhythm, left bundle-branch block. LEXISCAN:  Lexiscan infused to a total dose of 0.4 mg in a rapid bolus  fashion followed by 31.1 mCi of Myoview. Resting imaging performed  earlier in the day after injection of 11.9 mCi of Myoview. The patient  tolerated the procedure without adverse effect. Slight shortness of  breath was quite transient. LV SYSTOLIC FUNCTION:  LV systolic function calculated at 77% LVEF. There is no evidence of LV dilatation, RV dilatation, or segmental wall  motion abnormalities. TID is normal as well. PERFUSION STUDY:  Perfusion is homogeneous except for fixed defect in  the septum that shows wall thickening. This is consistent with probable  combination of breast attenuation artifact as well as left bundle branch  block. FINAL IMPRESSION:  The patient with normal LV systolic function, and no  evidence of inducible ischemia on Lexiscan perfusion imaging. This will  be considered a low-risk study for cardiac event in the near future.       Reymundo Galvez MD    D: 2019 #14:36:24       T: 2019 15:46:37     [email or web address]  Job#: [id number]     Doc#: [id number]    CC:

## 2019-07-23 VITALS
SYSTOLIC BLOOD PRESSURE: 134 MMHG | HEIGHT: 62 IN | DIASTOLIC BLOOD PRESSURE: 49 MMHG | WEIGHT: 185.19 LBS | BODY MASS INDEX: 34.08 KG/M2 | TEMPERATURE: 98.5 F | RESPIRATION RATE: 18 BRPM | OXYGEN SATURATION: 99 % | HEART RATE: 74 BPM

## 2019-07-23 LAB
ANION GAP SERPL CALCULATED.3IONS-SCNC: 11 MEQ/L (ref 9–15)
BUN BLDV-MCNC: 4 MG/DL (ref 6–20)
CALCIUM SERPL-MCNC: 8.1 MG/DL (ref 8.5–9.9)
CHLORIDE BLD-SCNC: 108 MEQ/L (ref 95–107)
CO2: 22 MEQ/L (ref 20–31)
CREAT SERPL-MCNC: 0.37 MG/DL (ref 0.5–0.9)
GFR AFRICAN AMERICAN: >60
GFR NON-AFRICAN AMERICAN: >60
GLUCOSE BLD-MCNC: 107 MG/DL (ref 70–99)
HCT VFR BLD CALC: 25.9 % (ref 37–47)
HEMOGLOBIN: 9 G/DL (ref 12–16)
POTASSIUM SERPL-SCNC: 3.5 MEQ/L (ref 3.4–4.9)
SODIUM BLD-SCNC: 141 MEQ/L (ref 135–144)

## 2019-07-23 PROCEDURE — 85014 HEMATOCRIT: CPT

## 2019-07-23 PROCEDURE — 2580000003 HC RX 258: Performed by: THORACIC SURGERY (CARDIOTHORACIC VASCULAR SURGERY)

## 2019-07-23 PROCEDURE — 6370000000 HC RX 637 (ALT 250 FOR IP): Performed by: THORACIC SURGERY (CARDIOTHORACIC VASCULAR SURGERY)

## 2019-07-23 PROCEDURE — 2580000003 HC RX 258: Performed by: ANESTHESIOLOGY

## 2019-07-23 PROCEDURE — 97110 THERAPEUTIC EXERCISES: CPT

## 2019-07-23 PROCEDURE — 80048 BASIC METABOLIC PNL TOTAL CA: CPT

## 2019-07-23 PROCEDURE — 36592 COLLECT BLOOD FROM PICC: CPT

## 2019-07-23 PROCEDURE — 85018 HEMOGLOBIN: CPT

## 2019-07-23 PROCEDURE — 6370000000 HC RX 637 (ALT 250 FOR IP): Performed by: PHYSICIAN ASSISTANT

## 2019-07-23 PROCEDURE — 97535 SELF CARE MNGMENT TRAINING: CPT

## 2019-07-23 PROCEDURE — 6370000000 HC RX 637 (ALT 250 FOR IP): Performed by: INTERNAL MEDICINE

## 2019-07-23 PROCEDURE — 2700000000 HC OXYGEN THERAPY PER DAY

## 2019-07-23 RX ORDER — LOSARTAN POTASSIUM 25 MG/1
25 TABLET ORAL DAILY
Qty: 30 TABLET | Refills: 3 | Status: SHIPPED | OUTPATIENT
Start: 2019-07-24

## 2019-07-23 RX ADMIN — APIXABAN 5 MG: 5 TABLET, FILM COATED ORAL at 09:09

## 2019-07-23 RX ADMIN — OXYCODONE HYDROCHLORIDE AND ACETAMINOPHEN 500 MG: 500 TABLET ORAL at 09:09

## 2019-07-23 RX ADMIN — ASPIRIN 81 MG 81 MG: 81 TABLET ORAL at 09:09

## 2019-07-23 RX ADMIN — LOSARTAN POTASSIUM 25 MG: 25 TABLET ORAL at 09:09

## 2019-07-23 RX ADMIN — METOPROLOL SUCCINATE 25 MG: 25 TABLET, EXTENDED RELEASE ORAL at 09:09

## 2019-07-23 RX ADMIN — VITAMIN D, TAB 1000IU (100/BT) 1000 UNITS: 25 TAB at 09:09

## 2019-07-23 RX ADMIN — OXYCODONE HYDROCHLORIDE AND ACETAMINOPHEN 1 TABLET: 5; 325 TABLET ORAL at 09:14

## 2019-07-23 RX ADMIN — DIVALPROEX SODIUM 125 MG: 125 CAPSULE, COATED PELLETS ORAL at 09:09

## 2019-07-23 RX ADMIN — OXYCODONE HYDROCHLORIDE AND ACETAMINOPHEN 1 TABLET: 5; 325 TABLET ORAL at 13:28

## 2019-07-23 RX ADMIN — DIVALPROEX SODIUM 125 MG: 125 CAPSULE, COATED PELLETS ORAL at 12:54

## 2019-07-23 RX ADMIN — DULOXETINE HYDROCHLORIDE 30 MG: 30 CAPSULE, DELAYED RELEASE ORAL at 09:09

## 2019-07-23 RX ADMIN — PHENYTOIN SODIUM 100 MG: 100 CAPSULE ORAL at 12:54

## 2019-07-23 RX ADMIN — DOCUSATE SODIUM 100 MG: 100 CAPSULE, LIQUID FILLED ORAL at 09:09

## 2019-07-23 RX ADMIN — OXYCODONE HYDROCHLORIDE AND ACETAMINOPHEN 1 TABLET: 5; 325 TABLET ORAL at 17:28

## 2019-07-23 RX ADMIN — Medication 10 ML: at 09:09

## 2019-07-23 RX ADMIN — ZONISAMIDE 100 MG: 100 CAPSULE ORAL at 09:08

## 2019-07-23 RX ADMIN — PHENYTOIN SODIUM 200 MG: 100 CAPSULE ORAL at 09:09

## 2019-07-23 RX ADMIN — OXYCODONE HYDROCHLORIDE AND ACETAMINOPHEN 1 TABLET: 5; 325 TABLET ORAL at 04:46

## 2019-07-23 RX ADMIN — LACTOBACILLUS TAB 1 TABLET: TAB at 09:09

## 2019-07-23 RX ADMIN — SODIUM CHLORIDE: 9 INJECTION, SOLUTION INTRAVENOUS at 09:13

## 2019-07-23 RX ADMIN — POTASSIUM CHLORIDE 10 MEQ: 750 CAPSULE, EXTENDED RELEASE ORAL at 09:09

## 2019-07-23 ASSESSMENT — PAIN SCALES - GENERAL
PAINLEVEL_OUTOF10: 5
PAINLEVEL_OUTOF10: 0
PAINLEVEL_OUTOF10: 10
PAINLEVEL_OUTOF10: 0
PAINLEVEL_OUTOF10: 10
PAINLEVEL_OUTOF10: 0
PAINLEVEL_OUTOF10: 10
PAINLEVEL_OUTOF10: 0
PAINLEVEL_OUTOF10: 10
PAINLEVEL_OUTOF10: 10
PAINLEVEL_OUTOF10: 0

## 2019-07-23 ASSESSMENT — PAIN DESCRIPTION - ORIENTATION
ORIENTATION: RIGHT

## 2019-07-23 ASSESSMENT — PAIN DESCRIPTION - LOCATION
LOCATION: LEG
LOCATION: HEAD;LEG
LOCATION: LEG
LOCATION: LEG

## 2019-07-23 ASSESSMENT — PAIN DESCRIPTION - DESCRIPTORS
DESCRIPTORS: SHOOTING;THROBBING
DESCRIPTORS: HEADACHE;THROBBING
DESCRIPTORS: SHOOTING;THROBBING
DESCRIPTORS: SHOOTING;THROBBING

## 2019-07-23 ASSESSMENT — PAIN DESCRIPTION - PAIN TYPE
TYPE: SURGICAL PAIN
TYPE: SURGICAL PAIN
TYPE: ACUTE PAIN;SURGICAL PAIN

## 2019-07-23 NOTE — PROGRESS NOTES
1920- Bedside report received from [facility]. Patient awaiting transport to [facility]. Post operative from [facility] with Dr. Baylee Ferguson. Dressing change completed by Dr. Baylee Ferguson today, orders to leave dressing in place on transport to nursing home. Report was called to [facility] by dayshift RN. Patient has CVC line remaining in place due to eliquis given this AM. Gary alerted NH that CVC will remain in place and to hold eliquis x 2 before removal. VSS. Patient denies pain. Will await transport. 1956- Patient picked up by life care at this time. Report given to transport team. [facility] called and alerted that patient was on her way. Patient belongings sent with patient at this time.

## 2019-07-23 NOTE — PROGRESS NOTES
Hospitalist Progress Note      PCP: Demetrius Patton MD    Date of Admission: 7/19/2019    Chief Complaint:    No chief complaint on file. Subjective:  Patient denies fevers, chills, sweats, diarrhea, burning micturition. Pain is improving. 12 point ROS negative other than mentioned above     Medications:  Reviewed    Infusion Medications    sodium chloride 100 mL/hr at 07/23/19 0913     Scheduled Medications    atorvastatin  20 mg Oral Nightly    aspirin  81 mg Oral Daily    apixaban  5 mg Oral BID    losartan  25 mg Oral Daily    sodium chloride flush  10 mL Intravenous 2 times per day    divalproex  125 mg Oral TID    docusate sodium  100 mg Oral QAM    DULoxetine  30 mg Oral Daily    lactobacillus acidophilus  1 tablet Oral Daily    metoprolol succinate  25 mg Oral Daily    phenytoin  100 mg Oral Lunch    phenytoin  200 mg Oral BID    potassium chloride  10 mEq Oral Daily    traZODone  50 mg Oral Nightly    vitamin C  500 mg Oral Daily    vitamin D  1,000 Units Oral Daily    zonisamide  100 mg Oral Daily     PRN Meds: sodium chloride flush, oxyCODONE-acetaminophen, sodium chloride flush, ondansetron, ipratropium-albuterol, magnesium hydroxide      Intake/Output Summary (Last 24 hours) at 7/23/2019 1051  Last data filed at 7/23/2019 0600  Gross per 24 hour   Intake 2598 ml   Output 1350 ml   Net 1248 ml     Exam:    BP (!) 111/45   Pulse 77   Temp 99.1 °F (37.3 °C) (Oral)   Resp 21   Ht 5' 2\" (1.575 m)   Wt 185 lb 3 oz (84 kg)   SpO2 97%   BMI 33.87 kg/m²     General appearance: No apparent distress, appears stated age and cooperative. HEENT:  Conjunctivae/corneas clear. Neck:  Trachea midline. Respiratory:  Normal respiratory effort. Clear to auscultation  Cardiovascular: Regular rate and rhythm   Abdomen: Soft, non-tender, non-distended with normal bowel sounds.   Musculoskeletal: Right AKA   Neuro: Non Focal.   Capillary Refill: Brisk,< 3 seconds   Peripheral Pulses: +2 palpable, equal bilaterally     Labs:   Recent Labs     07/21/19  0600 07/22/19  0600 07/23/19  0505   HGB 8.9* 8.9* 9.0*   HCT 26.0* 25.7* 25.9*     Recent Labs     07/21/19  0600 07/22/19  0600 07/23/19  0502    144 141   K 3.8 3.4 3.5   * 112* 108*   CO2 25 23 22   BUN 4* 4* 4*   CREATININE 0.39* 0.38* 0.37*   CALCIUM 8.1* 8.0* 8.1*     No results for input(s): AST, ALT, BILIDIR, BILITOT, ALKPHOS in the last 72 hours. No results for input(s): INR in the last 72 hours. Recent Labs     07/20/19  1508 07/20/19  2030   TROPONINI <0.010 <0.010       Urinalysis:      Lab Results   Component Value Date    NITRU Negative 07/18/2019    WBCUA 0-2 12/20/2016    BACTERIA Many 12/20/2016    RBCUA 0-2 12/20/2016    BLOODU Negative 07/18/2019    SPECGRAV 1.013 07/18/2019    GLUCOSEU Negative 07/18/2019     Radiology:  XR CHEST PORTABLE   Final Result      No acute intrathoracic process. NM MYOCARDIAL SPECT REST EXERCISE OR RX    (Results Pending)     Assessment/Plan:    #Right AKA - management, DVT Ppx and pain management per primary team  #HTN:  Continue home meds  #DMII:  SSI  #Chest pain:  S/p lexiscan  [facility]  [facility]    Active [facility] Problems    Diagnosis Date Noted    Hypertension [I10] 07/21/2019     Priority: High    Paroxysmal atrial fibrillation ([facility] [I48.0] 07/21/2019     Priority: High    Diabetes mellitus ([facility] [E11.9] 07/21/2019    PAD (peripheral artery disease) ([facility] [I73.9] 07/19/2019     Additional work up or/and treatment plan may be added today or then after based on clinical progression. I am managing a portion of pt care. Some medical issues are handled by other specialists. Additional work up and treatment should be done in out pt setting by pt PCP and other out pt providers. In addition to examining and evaluating pt, I spent additional time explaining care, normal and abnormal findings, and treatment plan. All of pt questions were answered.  Counseling, diet and education were

## 2019-07-23 NOTE — PROGRESS NOTES
Laterality Date    AMPUTATION ABOVE KNEE Right 7/19/2019    RIGHT ABOVE KNEE LEG AMPUTATION performed by Isela Sood MD at [address] [facility] ARTERIOGRAM Right 6/11/2019    RIGHT ARM AORTO FEMORAL DIGITAL SUBTRACTION ARTERIOGRAM performed by Isela Sood MD at [facility] Right 6/11/2019    REVISION AND REVASCULARIZATION RIGHT LOWER EXTREMITY performed by Isela Sood MD at [facility] Right           Restrictions:  Restrictions/Precautions: Fall Risk  Position Activity Restriction  Other position/activity restrictions: R AKA on 7/19/19    SUBJECTIVE:  Subjective  Subjective: It's a 15 honey. (referring to leg pain)    Pre Pain Assessment:  Pre Treatment Pain Screening  Pain at present: 10  Intervention List: Patient able to continue with treatment          Post Pain Assessment:   Pain Assessment  Pain Level: 10  Pain Location: Leg  Pain Orientation: Right  Pain Descriptors: Shooting; Throbbing       OBJECTIVE:         Bed mobility  Rolling to Left: Maximum assistance  Supine to Sit: Moderate assistance;2 Person assistance  Sit to Supine: Moderate assistance;2 Person assistance  Comment: Pt likes to attempt movement before assist. Pain limiting this session; Pt just had dressing changed. Transfers  Sit to Stand: Maximum Assistance;2 Person Assistance  Comment: 2 attempts to stand @ ww, pt unable to clear buttocks off bed. 6\" platform utilized d/t pt height. Good effort given               Exercises  Hip Flexion: x20 L; attempted on R unable d/t pain. Knee Long Arc Quad: x20 L  Ankle Pumps: x20 - L                     ASSESSMENT:  Body structures, Functions, Activity limitations: Decreased functional mobility ; Decreased endurance;Decreased safe awareness;Decreased balance;Decreased strength    Assessment: Pt giving good effort to participate but is pain limiting this session. Attempted STS but unale to clear buttocks off bed.      Activity Tolerance  Activity

## 2019-07-23 NOTE — DISCHARGE INSTR - COC
Continuity of Care Form    Patient Name: Brigitte Brito   :  1960  MRN:  [id number]    Admit date:  2019  Discharge date:  19    Code Status Order: Full Code   Advance Directives:   Advance Care Flowsheet Documentation     Date/Time Healthcare Directive Type of Healthcare Directive Copy in [address] Agent's Name Healthcare Agent's Phone Number    [phone]  Yes, patient has an advance directive for healthcare treatment  Durable power of  for health care  Yes, copy in chart Copy in [address] of   Melissa Hernandez  [phone] (M)          Admitting Physician:  Balwinder Jaramillo MD  PCP: Demetrius Patton MD    Discharging Nurse: [facility] Unit/Room#: IC10/IC10-01  Discharging Unit Phone Number: [phone]    Emergency Contact:   Extended Emergency Contact Information  Primary Emergency Contact: [address] [place] [address] Phone: [phone]  Mobile Phone: [phone]  Relation: Other  Secondary Emergency Contact: [address] [place] [address] Phone: [phone]  Mobile Phone: [phone]  Relation: Other    Past Surgical History:  Past Surgical History:   Procedure Laterality Date    AMPUTATION ABOVE KNEE Right 2019    RIGHT ABOVE KNEE LEG AMPUTATION performed by Balwinder Jaramillo MD at [address] [place] [address] ARTERIOGRAM Right 2019    RIGHT ARM AORTO FEMORAL DIGITAL SUBTRACTION ARTERIOGRAM performed by Balwinder Jaramillo MD at [facility] Right 2019    REVISION AND REVASCULARIZATION RIGHT LOWER EXTREMITY performed by Balwinder Jaramillo MD at [address] [place] [address] Right        Immunization History:   Immunization History   Administered Date(s) Administered    Influenza Vaccine, unspecified formulation 10/30/2016    Pneumococcal Polysaccharide ([id number]) 10/30/2016       Active Problems:  Patient Active Problem List   Diagnosis Code  Lethargy R53.83    Ischemia of lower extremity I99.8    Cerebrovascular accident, old Z80.78    Noncompliance with treatment Z91.19    Deep vein thrombosis (DVT) of lower extremity ([facility]) I82.409    Angiopathy, peripheral ([facility]) I73.9    History of amputation of lower extremity through tibia and fibula ([facility]) Z89.519    Carotid artery disease ([facility]) I77.9    Dementia with aggressive behavior F03.91    Wound infection T14. 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 ([facility] L97.205    PAD (peripheral artery disease) ([facility]) I73.9    Hypertension I10    Diabetes mellitus ([facility]) E11.9    Paroxysmal atrial fibrillation ([facility]) I48.0       Isolation/Infection:   Isolation          No Isolation            Nurse Assessment:  Last Vital Signs: BP (!) 121/52   Pulse 71   Temp 98.6 °F (37 °C) (Oral)   Resp 15   Ht 5' 2\" (1.575 m)   Wt 185 lb 3 oz (84 kg)   SpO2 98%   BMI 33.87 kg/m²     Last documented pain score (0-10 scale): Pain Level: 0  Last Weight:   Wt Readings from Last 1 Encounters:   07/22/19 185 lb 3 oz (84 kg)     Mental Status:  oriented and alert    IV Access:  - Central Venous Catheter  - site  right and subclavian, insertion date: 7/19/19    Nursing Mobility/ADLs:  Walking   Dependent  Transfer  Assisted  Bathing  Assisted  Dressing  Assisted  Toileting  Assisted  Feeding  Independent  Med Admin  Dependent  Med Delivery   whole    Wound Care Documentation and Therapy:  Wound 06/07/19 Leg Medial;Right #1 right BKA (Active)   Wound Other 7/21/2019 11:00 PM   Dressing Status Old drainage 7/23/2019  4:00 AM   Dressing Changed Dressing reinforced 7/23/2019  4:00 AM   Dressing/Treatment Other (comment) 7/22/2019  4:00 AM   Wound Assessment DUSTIN 7/23/2019  4:00 AM   Drainage Amount Small 7/23/2019  4:00 AM   Drainage Description DUSTIN 7/23/2019  4:00 AM Odor None 7/23/2019  4:00 AM   Chani-wound Assessment DUSTIN 7/23/2019  4:00 AM   Culture Taken No 7/21/2019 11:00 PM   Number of days: 45        Elimination:  Continence:   · Bowel: Yes  · Bladder: No  Urinary Catheter: None   Colostomy/Ileostomy/Ileal Conduit: No       Date of Last BM: 7/22/19    Intake/Output Summary (Last 24 hours) at 7/23/2019 0728  Last data filed at 7/23/2019 0600  Gross per 24 hour   Intake 2598 ml   Output 1350 ml   Net 1248 ml     I/O last 3 completed shifts: In: 2598 [P.O.:480; I.V.:2118]  Out: 1350 [Urine:1350]    Safety Concerns:     History of Falls (last 30 days), At Risk for Falls and History of Seizures    Impairments/Disabilities:      Amputation - right AKA    Nutrition Therapy:  Current Nutrition Therapy:   - Oral Diet:  General    Routes of Feeding: Oral  Liquids: Thin Liquids  Daily Fluid Restriction: no  Last Modified Barium Swallow with Video (Video Swallowing Test): not done    Treatments at the Time of Hospital Discharge:   Respiratory Treatments: ***  Oxygen Therapy:  is on oxygen at 2 L/min per nasal cannula.   Ventilator:    - No ventilator support    Rehab Therapies: Physical Therapy and Occupational Therapy  Weight Bearing Status/Restrictions: No weight bearing restirctions  Other Medical Equipment (for information only, NOT a DME order):  wheelchair  Other Treatments:       Patient's personal belongings (please select all that are sent with patient):  {[facility] DME Belongings:[id number]}    RN SIGNATURE:  Electronically signed by Ally Mckoy RN on 7/23/19 at 4:09 PM    CASE MANAGEMENT/SOCIAL WORK SECTION    Inpatient Status Date: ***    Readmission Risk Assessment Score:  Readmission Risk              Risk of Unplanned Readmission:        21           Discharging to Facility/ Agency   · Name: [facility]  · Address:  · Phone:[phone]  · Fax:    Dialysis Facility (if applicable)   · Name:  · Address:  · Dialysis Schedule:  · Phone:  · Fax:    /Social Worker signature: {Esignature:[id number]}. Electronically signed by SHRUTHI Nava on 7/23/2019 at 7:29 AM    PHYSICIAN SECTION    Prognosis: Fair    Condition at Discharge: Stable    Rehab Potential (if transferring to Rehab): Good    Recommended Labs or Other Treatments After Discharge: none    Physician Certification: I certify the above information and transfer of Radha Marley  is necessary for the continuing treatment of the diagnosis listed and that she requires [facility] for greater 30 days.      Update Admission H&P: No change in H&P    PHYSICIAN SIGNATURE:  Electronically signed by Chet Maldonado MD on 7/23/19 at 1:46 PM

## 2019-07-23 NOTE — PROGRESS NOTES
°C) (Oral)   Resp 24   Ht 5' 2\" (1.575 m)   Wt 185 lb 3 oz (84 kg)   SpO2 93%   BMI 33.87 kg/m²    Wt Readings from Last 3 Encounters:   07/22/19 185 lb 3 oz (84 kg)   07/18/19 188 lb (85.3 kg)   07/12/19 216 lb (98 kg)       TELEMETRY: normal sinus                             Rhythm Strip: No a.fib for about 72 houtd    NYHA Class: III    Physical Exam:  General Appearance: obese alert and oriented to person, place and time, significant leg pain  Cardiovascular: normal rate, regular rhythm, normal S1 and S2, II/VI AS murmur. No rubs, clicks, or gallops,  no JVD  Pulmonary/Chest: clear to auscultation bilaterally- no wheezes, rales or rhonchi, normal air movement, no respiratory distress. Occasional expir rhonchi clear with cough  Abdomen: obese, soft, non-tender, non-distended, normal bowel sounds, no masses   Extremities: no cyanosis, clubbing or edema. Stump is bandaged on right. Left leg no edema, pulse barely palp. Foot warm. Stump no bleeding or bruising feels better today. Skin: warm and dry, no rash or erythema  Eyes: eomi  Neck: supple and non-tender without mass, no thyromegaly   Neurological: alert, oriented, normal speech, rt facial droop.  Rt yudi par    Medications:    atorvastatin  20 mg Oral Nightly    aspirin  81 mg Oral Daily    apixaban  5 mg Oral BID    losartan  25 mg Oral Daily    sodium chloride flush  10 mL Intravenous 2 times per day    divalproex  125 mg Oral TID    docusate sodium  100 mg Oral QAM    DULoxetine  30 mg Oral Daily    lactobacillus acidophilus  1 tablet Oral Daily    metoprolol succinate  25 mg Oral Daily    phenytoin  100 mg Oral Lunch    phenytoin  200 mg Oral BID    potassium chloride  10 mEq Oral Daily    traZODone  50 mg Oral Nightly    vitamin C  500 mg Oral Daily    vitamin D  1,000 Units Oral Daily    zonisamide  100 mg Oral Daily      sodium chloride 100 mL/hr at 07/23/19 0913       sodium chloride flush 10 mL PRN   oxyCODONE-acetaminophen 1

## 2019-08-02 ENCOUNTER — TELEPHONE (OUTPATIENT)
Dept: INFECTIOUS DISEASES | Age: 59
End: 2019-08-02

## 2019-08-09 ENCOUNTER — OFFICE VISIT (OUTPATIENT)
Dept: INFECTIOUS DISEASES | Age: 59
End: 2019-08-09
Payer: MEDICAID

## 2019-08-09 ENCOUNTER — APPOINTMENT (OUTPATIENT)
Dept: GENERAL RADIOLOGY | Age: 59
End: 2019-08-09
Payer: MEDICAID

## 2019-08-09 ENCOUNTER — HOSPITAL ENCOUNTER (INPATIENT)
Age: 59
LOS: 7 days | Discharge: NURSING, REHAB OR LONG-TERM CARE | End: 2019-08-16
Attending: EMERGENCY MEDICINE | Admitting: FAMILY MEDICINE
Payer: MEDICAID

## 2019-08-09 VITALS
TEMPERATURE: 97.7 F | SYSTOLIC BLOOD PRESSURE: 110 MMHG | HEART RATE: 84 BPM | HEIGHT: 63 IN | BODY MASS INDEX: 32.8 KG/M2 | DIASTOLIC BLOOD PRESSURE: 67 MMHG

## 2019-08-09 DIAGNOSIS — T14.8XXA WOUND INFECTION: ICD-10-CM

## 2019-08-09 DIAGNOSIS — L97.205: Chronic | ICD-10-CM

## 2019-08-09 DIAGNOSIS — F03.918 DEMENTIA WITH AGGRESSIVE BEHAVIOR: ICD-10-CM

## 2019-08-09 DIAGNOSIS — T81.31XA DEHISCENCE OF OPERATIVE WOUND, INITIAL ENCOUNTER: ICD-10-CM

## 2019-08-09 DIAGNOSIS — I99.8 ISCHEMIA OF LOWER EXTREMITY: ICD-10-CM

## 2019-08-09 DIAGNOSIS — Z89.519 HISTORY OF AMPUTATION OF LOWER EXTREMITY THROUGH TIBIA AND FIBULA, UNSPECIFIED LATERALITY (HCC): ICD-10-CM

## 2019-08-09 DIAGNOSIS — T81.89XA DELAYED SURGICAL WOUND HEALING OF BELOW-THE-KNEE AMPUTATION STUMP (HCC): Primary | Chronic | ICD-10-CM

## 2019-08-09 DIAGNOSIS — L08.9 WOUND INFECTION: Primary | ICD-10-CM

## 2019-08-09 DIAGNOSIS — I73.9 PAD (PERIPHERAL ARTERY DISEASE) (HCC): ICD-10-CM

## 2019-08-09 DIAGNOSIS — Z91.199 NONCOMPLIANCE: ICD-10-CM

## 2019-08-09 DIAGNOSIS — T87.89 DELAYED SURGICAL WOUND HEALING OF BELOW-THE-KNEE AMPUTATION STUMP (HCC): Primary | Chronic | ICD-10-CM

## 2019-08-09 DIAGNOSIS — L08.9 WOUND INFECTION: ICD-10-CM

## 2019-08-09 DIAGNOSIS — T14.8XXA WOUND INFECTION: Primary | ICD-10-CM

## 2019-08-09 PROBLEM — T81.30XA WOUND DEHISCENCE: Status: ACTIVE | Noted: 2019-08-09

## 2019-08-09 LAB
ALBUMIN SERPL-MCNC: 3.6 G/DL (ref 3.5–4.6)
ALP BLD-CCNC: 134 U/L (ref 40–130)
ALT SERPL-CCNC: 6 U/L (ref 0–33)
ANION GAP SERPL CALCULATED.3IONS-SCNC: 14 MEQ/L (ref 9–15)
AST SERPL-CCNC: 10 U/L (ref 0–35)
BASOPHILS ABSOLUTE: 0.1 K/UL (ref 0–0.2)
BASOPHILS RELATIVE PERCENT: 1.2 %
BILIRUB SERPL-MCNC: <0.2 MG/DL (ref 0.2–0.7)
BILIRUBIN URINE: NEGATIVE
BLOOD, URINE: NEGATIVE
BUN BLDV-MCNC: 8 MG/DL (ref 8–23)
CALCIUM SERPL-MCNC: 9.2 MG/DL (ref 8.5–9.9)
CHLORIDE BLD-SCNC: 102 MEQ/L (ref 95–107)
CLARITY: CLEAR
CO2: 25 MEQ/L (ref 20–31)
COLOR: YELLOW
CREAT SERPL-MCNC: 0.49 MG/DL (ref 0.5–0.9)
EOSINOPHILS ABSOLUTE: 0.3 K/UL (ref 0–0.7)
EOSINOPHILS RELATIVE PERCENT: 3.7 %
GFR AFRICAN AMERICAN: >60
GFR NON-AFRICAN AMERICAN: >60
GLOBULIN: 3.9 G/DL (ref 2.3–3.5)
GLUCOSE BLD-MCNC: 93 MG/DL (ref 70–99)
GLUCOSE URINE: NEGATIVE MG/DL
HCT VFR BLD CALC: 35 % (ref 37–47)
HEMOGLOBIN: 11.8 G/DL (ref 12–16)
KETONES, URINE: NEGATIVE MG/DL
LEUKOCYTE ESTERASE, URINE: NEGATIVE
LYMPHOCYTES ABSOLUTE: 1.8 K/UL (ref 1–4.8)
LYMPHOCYTES RELATIVE PERCENT: 25.5 %
MCH RBC QN AUTO: 33.2 PG (ref 27–31.3)
MCHC RBC AUTO-ENTMCNC: 33.7 % (ref 33–37)
MCV RBC AUTO: 98.3 FL (ref 82–100)
MONOCYTES ABSOLUTE: 0.5 K/UL (ref 0.2–0.8)
MONOCYTES RELATIVE PERCENT: 7.1 %
NEUTROPHILS ABSOLUTE: 4.5 K/UL (ref 1.4–6.5)
NEUTROPHILS RELATIVE PERCENT: 62.5 %
NITRITE, URINE: NEGATIVE
PDW BLD-RTO: 13.9 % (ref 11.5–14.5)
PH UA: 7 (ref 5–9)
PLATELET # BLD: 338 K/UL (ref 130–400)
POTASSIUM SERPL-SCNC: 4.1 MEQ/L (ref 3.4–4.9)
PROTEIN UA: NEGATIVE MG/DL
RBC # BLD: 3.56 M/UL (ref 4.2–5.4)
SODIUM BLD-SCNC: 141 MEQ/L (ref 135–144)
SPECIFIC GRAVITY UA: 1.01 (ref 1–1.03)
TOTAL PROTEIN: 7.5 G/DL (ref 6.3–8)
URINE REFLEX TO CULTURE: NORMAL
UROBILINOGEN, URINE: 0.2 E.U./DL
WBC # BLD: 7.3 K/UL (ref 4.8–10.8)

## 2019-08-09 PROCEDURE — 73552 X-RAY EXAM OF FEMUR 2/>: CPT

## 2019-08-09 PROCEDURE — 99285 EMERGENCY DEPT VISIT HI MDM: CPT

## 2019-08-09 PROCEDURE — 99215 OFFICE O/P EST HI 40 MIN: CPT | Performed by: INTERNAL MEDICINE

## 2019-08-09 PROCEDURE — 2580000003 HC RX 258: Performed by: EMERGENCY MEDICINE

## 2019-08-09 PROCEDURE — 87205 SMEAR GRAM STAIN: CPT

## 2019-08-09 PROCEDURE — 6370000000 HC RX 637 (ALT 250 FOR IP): Performed by: FAMILY MEDICINE

## 2019-08-09 PROCEDURE — 85025 COMPLETE CBC W/AUTO DIFF WBC: CPT

## 2019-08-09 PROCEDURE — 96365 THER/PROPH/DIAG IV INF INIT: CPT

## 2019-08-09 PROCEDURE — 87070 CULTURE OTHR SPECIMN AEROBIC: CPT

## 2019-08-09 PROCEDURE — 87075 CULTR BACTERIA EXCEPT BLOOD: CPT

## 2019-08-09 PROCEDURE — 1210000000 HC MED SURG R&B

## 2019-08-09 PROCEDURE — 87077 CULTURE AEROBIC IDENTIFY: CPT

## 2019-08-09 PROCEDURE — 87185 SC STD ENZYME DETCJ PER NZM: CPT

## 2019-08-09 PROCEDURE — 87186 SC STD MICRODIL/AGAR DIL: CPT

## 2019-08-09 PROCEDURE — 36415 COLL VENOUS BLD VENIPUNCTURE: CPT

## 2019-08-09 PROCEDURE — 81003 URINALYSIS AUTO W/O SCOPE: CPT

## 2019-08-09 PROCEDURE — 6360000002 HC RX W HCPCS: Performed by: EMERGENCY MEDICINE

## 2019-08-09 PROCEDURE — 80053 COMPREHEN METABOLIC PANEL: CPT

## 2019-08-09 PROCEDURE — 2580000003 HC RX 258: Performed by: FAMILY MEDICINE

## 2019-08-09 PROCEDURE — 6360000002 HC RX W HCPCS: Performed by: FAMILY MEDICINE

## 2019-08-09 PROCEDURE — 87040 BLOOD CULTURE FOR BACTERIA: CPT

## 2019-08-09 PROCEDURE — 96367 TX/PROPH/DG ADDL SEQ IV INF: CPT

## 2019-08-09 RX ORDER — FERROUS SULFATE 325(65) MG
325 TABLET ORAL EVERY 12 HOURS
COMMUNITY

## 2019-08-09 RX ORDER — SODIUM CHLORIDE 0.9 % (FLUSH) 0.9 %
10 SYRINGE (ML) INJECTION PRN
Status: DISCONTINUED | OUTPATIENT
Start: 2019-08-09 | End: 2019-08-16 | Stop reason: SDUPTHER

## 2019-08-09 RX ORDER — ONDANSETRON 2 MG/ML
4 INJECTION INTRAMUSCULAR; INTRAVENOUS EVERY 6 HOURS PRN
Status: DISCONTINUED | OUTPATIENT
Start: 2019-08-09 | End: 2019-08-16 | Stop reason: HOSPADM

## 2019-08-09 RX ORDER — FOLIC ACID 1 MG/1
1 TABLET ORAL DAILY
COMMUNITY

## 2019-08-09 RX ORDER — NICOTINE 21 MG/24HR
1 PATCH, TRANSDERMAL 24 HOURS TRANSDERMAL DAILY
Status: DISCONTINUED | OUTPATIENT
Start: 2019-08-09 | End: 2019-08-16 | Stop reason: HOSPADM

## 2019-08-09 RX ORDER — SODIUM CHLORIDE 0.9 % (FLUSH) 0.9 %
10 SYRINGE (ML) INJECTION EVERY 12 HOURS SCHEDULED
Status: DISCONTINUED | OUTPATIENT
Start: 2019-08-09 | End: 2019-08-16 | Stop reason: SDUPTHER

## 2019-08-09 RX ORDER — MAGNESIUM HYDROXIDE 1200 MG/15ML
LIQUID ORAL
Status: DISPENSED
Start: 2019-08-09 | End: 2019-08-10

## 2019-08-09 RX ORDER — OXYCODONE HYDROCHLORIDE AND ACETAMINOPHEN 5; 325 MG/1; MG/1
1 TABLET ORAL EVERY 4 HOURS PRN
Status: DISCONTINUED | OUTPATIENT
Start: 2019-08-09 | End: 2019-08-10

## 2019-08-09 RX ORDER — SODIUM CHLORIDE 0.9 % (FLUSH) 0.9 %
3 SYRINGE (ML) INJECTION EVERY 8 HOURS
Status: DISCONTINUED | OUTPATIENT
Start: 2019-08-09 | End: 2019-08-16 | Stop reason: HOSPADM

## 2019-08-09 RX ORDER — DIVALPROEX SODIUM 250 MG/1
250 TABLET, EXTENDED RELEASE ORAL 3 TIMES DAILY
COMMUNITY

## 2019-08-09 RX ORDER — ACETAMINOPHEN 325 MG/1
650 TABLET ORAL EVERY 4 HOURS PRN
Status: DISCONTINUED | OUTPATIENT
Start: 2019-08-09 | End: 2019-08-11

## 2019-08-09 RX ADMIN — Medication 10 ML: at 21:14

## 2019-08-09 RX ADMIN — ENOXAPARIN SODIUM 40 MG: 40 INJECTION SUBCUTANEOUS at 21:13

## 2019-08-09 RX ADMIN — VANCOMYCIN HYDROCHLORIDE 1000 MG: 1 INJECTION, POWDER, LYOPHILIZED, FOR SOLUTION INTRAVENOUS at 18:27

## 2019-08-09 RX ADMIN — PIPERACILLIN SODIUM,TAZOBACTAM SODIUM 3.38 G: 3; .375 INJECTION, POWDER, FOR SOLUTION INTRAVENOUS at 17:25

## 2019-08-09 RX ADMIN — CEFTRIAXONE SODIUM 1 G: 1 INJECTION, POWDER, FOR SOLUTION INTRAMUSCULAR; INTRAVENOUS at 21:14

## 2019-08-09 RX ADMIN — OXYCODONE HYDROCHLORIDE AND ACETAMINOPHEN 1 TABLET: 5; 325 TABLET ORAL at 21:13

## 2019-08-09 ASSESSMENT — PAIN DESCRIPTION - ORIENTATION: ORIENTATION: RIGHT

## 2019-08-09 ASSESSMENT — ENCOUNTER SYMPTOMS
VOMITING: 0
WHEEZING: 0
RHINORRHEA: 0
CHEST TIGHTNESS: 0
BLOOD IN STOOL: 0
COUGH: 0
BACK PAIN: 0
SHORTNESS OF BREATH: 0
ABDOMINAL PAIN: 0
NAUSEA: 0
DIARRHEA: 0
TROUBLE SWALLOWING: 0
EYE PAIN: 0

## 2019-08-09 ASSESSMENT — PAIN DESCRIPTION - DESCRIPTORS: DESCRIPTORS: THROBBING

## 2019-08-09 ASSESSMENT — PAIN DESCRIPTION - FREQUENCY: FREQUENCY: CONTINUOUS

## 2019-08-09 ASSESSMENT — PAIN SCALES - GENERAL
PAINLEVEL_OUTOF10: 4
PAINLEVEL_OUTOF10: 6
PAINLEVEL_OUTOF10: 10

## 2019-08-09 ASSESSMENT — PAIN DESCRIPTION - LOCATION: LOCATION: KNEE

## 2019-08-09 ASSESSMENT — PAIN DESCRIPTION - PAIN TYPE: TYPE: ACUTE PAIN

## 2019-08-09 NOTE — ED PROVIDER NOTES
60years old with wound dehiscence and infected wound from right BKA incision about 3 weeks ago. Failed outpatient management. Patient was turned on to me from Dr. Arevalo Cones labs are back they are not significant x-ray showed no osteomyelitis.   But due to patient failed outpatient management patient will be admitted under the hospitalist with infectious disease consult      Dorys Durham MD  08/09/19 Nilesh Hernandez MD  08/09/19 7533
Right        CURRENT MEDICATIONS       Previous Medications    APIXABAN (ELIQUIS) 5 MG TABS TABLET    Take 1 tablet by mouth 2 times daily    ASPIRIN 81 MG CHEWABLE TABLET    Take 81 mg by mouth daily    ATORVASTATIN (LIPITOR) 10 MG TABLET    Take 10 mg by mouth nightly     DIVALPROEX (DEPAKOTE SPRINKLE) 125 MG CAPSULE    Take 125 mg by mouth 3 times daily    DOCUSATE SODIUM (COLACE) 100 MG CAPSULE    Take 100 mg by mouth every morning    DULOXETINE (CYMBALTA) 30 MG EXTENDED RELEASE CAPSULE    Take 1 capsule by mouth daily    FUROSEMIDE (LASIX) 20 MG TABLET    Take 40 mg by mouth daily     IPRATROPIUM-ALBUTEROL (DUONEB) 0.5-2.5 (3) MG/3ML SOLN NEBULIZER SOLUTION    Inhale 1 vial into the lungs every 8 hours as needed for Shortness of Breath (COPD)    ISOSORBIDE MONONITRATE (IMDUR) 30 MG EXTENDED RELEASE TABLET    Take 30 mg by mouth daily    LACTOBACILLUS (ACIDOPHILUS) CAPS CAPSULE    Take 1 capsule by mouth daily    LOSARTAN (COZAAR) 25 MG TABLET    Take 1 tablet by mouth daily    MAGNESIUM HYDROXIDE (MILK OF MAGNESIA) 400 MG/5ML SUSPENSION    Take 30 mLs by mouth daily as needed for Constipation    METOPROLOL SUCCINATE (TOPROL XL) 25 MG EXTENDED RELEASE TABLET    Take 25 mg by mouth daily    OXYCODONE-ACETAMINOPHEN (PERCOCET) 5-325 MG PER TABLET    Take 1 tablet by mouth every 6 hours as needed for Pain. PHENYTOIN (DILANTIN) 100 MG ER CAPSULE    Take 100 mg by mouth Daily with lunch     PHENYTOIN (DILANTIN) 100 MG ER CAPSULE    Take 200 mg by mouth 2 times daily MORNING AND AT BEDTIME    POTASSIUM CHLORIDE (MICRO-K) 10 MEQ EXTENDED RELEASE CAPSULE    Take 10 mEq by mouth daily    TRAZODONE (DESYREL) 50 MG TABLET    Take 50 mg by mouth nightly    VITAMIN C (ASCORBIC ACID) 500 MG TABLET    Take 500 mg by mouth daily    VITAMIN D 1000 UNITS CAPS    Take 1 capsule by mouth daily     ZONISAMIDE (ZONEGRAN) 100 MG CAPSULE    Take 100 mg by mouth daily       ALLERGIES     Patient has no known allergies.     FAMILY

## 2019-08-09 NOTE — ED NOTES
Bed: 21  Expected date: 8/9/19  Expected time: 3:47 PM  Means of arrival: Ambulance  Comments:  60 yr old female from Dr. Cody Price office for above the knee amputation with clear discharge.        Davonte Goldstein RN  08/09/19 0456

## 2019-08-09 NOTE — ED NOTES
Pt presents to the ER via [facility] from Dr Duane Standard office for infection of incision site on right BKA.   Pts incision open with drainge  Pt in pain, 10/10    Per Annika from [address] patient is non compliant and was supposed to have a wound vac but patient refused  Naun Samuels states that patient would continually pull off dressings       Deo Doyle RN  08/09/19 8502

## 2019-08-09 NOTE — PROGRESS NOTES
TYPE II    Diabetic neuropathy (HCC)     Hyperlipidemia     Hypertension     Insomnia     Muscle weakness     PVD (peripheral vascular disease) (HCC)     Seizures (HCC)     Vascular dementia     Vitamin D deficiency     Weakness        Past Surgical History:   Procedure Laterality Date    AMPUTATION ABOVE KNEE Right 7/19/2019    RIGHT ABOVE KNEE LEG AMPUTATION performed by Nathan Joe MD at [facility] ARTERIOGRAM Right 6/11/2019    RIGHT ARM AORTO FEMORAL DIGITAL SUBTRACTION ARTERIOGRAM performed by Nathan Joe MD at [facility] Right 6/11/2019    REVISION AND REVASCULARIZATION RIGHT LOWER EXTREMITY performed by Nathan Joe MD at [facility] Right        Social History     Socioeconomic History    Marital status: Single     Spouse name: Not on file    Number of children: Not on file    Years of education: Not on file    Highest education level: Not on file   Occupational History    Not on file   Social Needs    Financial resource strain: Not on file    Food insecurity:     Worry: Not on file     Inability: Not on file    Transportation needs:     Medical: Not on file     Non-medical: Not on file   Tobacco Use    Smoking status: Current Every Day Smoker     Packs/day: 0.50     Years: 13.00     Pack years: 6.50     Types: Cigarettes    Smokeless tobacco: Never Used   Substance and Sexual Activity    Alcohol use: No     Comment: in nursing home for 3 years    Drug use: No    Sexual activity: Not Currently   Lifestyle    Physical activity:     Days per week: Not on file     Minutes per session: Not on file    Stress: Not on file   Relationships    Social connections:     Talks on phone: Not on file     Gets together: Not on file     Attends Rastafarian service: Not on file     Active member of club or organization: Not on file     Attends meetings of clubs or organizations: Not on file     Relationship status: Not on file    Intimate daily as needed for Constipation      phenytoin (DILANTIN) 100 MG ER capsule Take 100 mg by mouth Daily with lunch       metoprolol succinate (TOPROL XL) 25 MG extended release tablet Take 25 mg by mouth daily      zonisamide (ZONEGRAN) 100 MG capsule Take 100 mg by mouth daily       No current facility-administered medications on file prior to visit. Physical Exam:  Patient tells me that she continues to smoke. She cannot tell me much else about her healthcare, stating \" Honey, I don't know why I am here\"   General: Patient appears very uncomfortable - the wound is as above. Skin: no new rashes   HEENT: EOMI, MMM, Neck is supple  Heart: S1 S2  Lungs: bilaterally clear   Abdomen: soft, ND, NTTP, +BS  Extrem: stump wound as above. Neuro exam: CN II-XII intact    Labs: I have reviewed all lab results by electronic record, including most recent CBC, metabolic panel, and pertinent abnormalities were addressed from an infectious disease perspective. WBC trends are being monitored. Lab Results   Component Value Date     07/23/2019    K 3.5 07/23/2019    K 3.3 06/14/2019     07/23/2019    CO2 22 07/23/2019    BUN 4 07/23/2019    CREATININE 0.37 07/23/2019    GLUCOSE 107 07/23/2019    GLUCOSE 94 12/28/2011    CALCIUM 8.1 07/23/2019      Lab Results   Component Value Date    WBC 6.6 07/18/2019    HGB 9.0 (L) 07/23/2019    HCT 25.9 (L) 07/23/2019    MCV 99.0 07/18/2019     07/18/2019       Radiology:   I have reviewed imaging results per electronic record and most pertinent abnormalities are being addressed from an infectious disease standpoint. Assessment:  Patient with hx of PVD and current tobacco abuse with large necrotic infected drainage wound of the R stump. Hx of CVA    Plan: Will need IV abx and likely revision  Sent to the ER by ambulance.        Martha Rick D.O.

## 2019-08-09 NOTE — PROGRESS NOTES
Pharmacy Note  Vancomycin Consult    Phillip Lancaster is a 60 y.o. female started on Vancomycin for wound infection; consult received from Dr. Nawaf Lee to manage therapy. Also receiving the following antibiotics: ceftriaxone. Patient Active Problem List   Diagnosis    Lethargy    Ischemia of lower extremity    Cerebrovascular accident, old    Noncompliance with treatment    Deep vein thrombosis (DVT) of lower extremity ([facility])    Angiopathy, peripheral ([facility]    History of amputation of lower extremity through tibia and fibula ([facility])    Carotid artery disease ([facility]    Dementia with aggressive behavior    Wound infection    Delayed surgical wound healing of below-the-knee amputation stump ([facility]    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 Temp max: 97.8    Recent Labs     08/09/19  1705   BUN 8       Recent Labs     08/09/19  1705   CREATININE 0.49*       Recent Labs     08/09/19 1705   WBC 7.3       No intake or output data in the 24 hours ending 08/09/19 1921    Culture Date      Source                       Results  Procedure Component Value Units Date/Time   WOUND CULTURE [[id number]]    Order Status: Sent Specimen: Leg    Anaerobic and Aerobic Culture [[id number]] Collected: 08/09/19 1727   Order Status: Sent Specimen: Wound Updated: 08/09/19 1728   Culture Blood #1 [[id number]] Collected: 08/09/19 1705   Order Status: Sent Specimen: Blood Updated: 08/09/19 1714   Culture Blood #2 [[id number]] Collected: 08/09/19 1705   Order Status: Sent Specimen: Blood Updated: 08/09/19 1714       Ht Readings from Last 1 Encounters:   08/09/19 5' 6\" (1.676 m)        Wt Readings from Last 1 Encounters:   08/09/19 185 lb (83.9 kg)         Body mass index is 29.86 kg/m².     Estimated

## 2019-08-10 LAB
ANION GAP SERPL CALCULATED.3IONS-SCNC: 11 MEQ/L (ref 9–15)
BASOPHILS ABSOLUTE: 0.1 K/UL (ref 0–0.2)
BASOPHILS RELATIVE PERCENT: 0.8 %
BUN BLDV-MCNC: 4 MG/DL (ref 8–23)
CALCIUM SERPL-MCNC: 9.2 MG/DL (ref 8.5–9.9)
CHLORIDE BLD-SCNC: 108 MEQ/L (ref 95–107)
CO2: 23 MEQ/L (ref 20–31)
CREAT SERPL-MCNC: 0.36 MG/DL (ref 0.5–0.9)
EOSINOPHILS ABSOLUTE: 0.2 K/UL (ref 0–0.7)
EOSINOPHILS RELATIVE PERCENT: 2.8 %
GFR AFRICAN AMERICAN: >60
GFR NON-AFRICAN AMERICAN: >60
GLUCOSE BLD-MCNC: 96 MG/DL (ref 70–99)
HCT VFR BLD CALC: 34.4 % (ref 37–47)
HEMOGLOBIN: 11.5 G/DL (ref 12–16)
LYMPHOCYTES ABSOLUTE: 1.1 K/UL (ref 1–4.8)
LYMPHOCYTES RELATIVE PERCENT: 14.8 %
MCH RBC QN AUTO: 33.2 PG (ref 27–31.3)
MCHC RBC AUTO-ENTMCNC: 33.5 % (ref 33–37)
MCV RBC AUTO: 99.1 FL (ref 82–100)
MONOCYTES ABSOLUTE: 0.6 K/UL (ref 0.2–0.8)
MONOCYTES RELATIVE PERCENT: 8.9 %
NEUTROPHILS ABSOLUTE: 5.2 K/UL (ref 1.4–6.5)
NEUTROPHILS RELATIVE PERCENT: 72.7 %
PDW BLD-RTO: 13.9 % (ref 11.5–14.5)
PLATELET # BLD: 318 K/UL (ref 130–400)
POTASSIUM REFLEX MAGNESIUM: 4 MEQ/L (ref 3.4–4.9)
RBC # BLD: 3.47 M/UL (ref 4.2–5.4)
SODIUM BLD-SCNC: 142 MEQ/L (ref 135–144)
WBC # BLD: 7.1 K/UL (ref 4.8–10.8)

## 2019-08-10 PROCEDURE — 6360000002 HC RX W HCPCS: Performed by: COLON & RECTAL SURGERY

## 2019-08-10 PROCEDURE — 1210000000 HC MED SURG R&B

## 2019-08-10 PROCEDURE — 6370000000 HC RX 637 (ALT 250 FOR IP): Performed by: COLON & RECTAL SURGERY

## 2019-08-10 PROCEDURE — 97162 PT EVAL MOD COMPLEX 30 MIN: CPT

## 2019-08-10 PROCEDURE — 6360000002 HC RX W HCPCS: Performed by: INTERNAL MEDICINE

## 2019-08-10 PROCEDURE — 2580000003 HC RX 258: Performed by: FAMILY MEDICINE

## 2019-08-10 PROCEDURE — 36415 COLL VENOUS BLD VENIPUNCTURE: CPT

## 2019-08-10 PROCEDURE — 6370000000 HC RX 637 (ALT 250 FOR IP): Performed by: INTERNAL MEDICINE

## 2019-08-10 PROCEDURE — 6370000000 HC RX 637 (ALT 250 FOR IP): Performed by: FAMILY MEDICINE

## 2019-08-10 PROCEDURE — 85025 COMPLETE CBC W/AUTO DIFF WBC: CPT

## 2019-08-10 PROCEDURE — 99222 1ST HOSP IP/OBS MODERATE 55: CPT | Performed by: INTERNAL MEDICINE

## 2019-08-10 PROCEDURE — 6360000002 HC RX W HCPCS: Performed by: FAMILY MEDICINE

## 2019-08-10 PROCEDURE — 80048 BASIC METABOLIC PNL TOTAL CA: CPT

## 2019-08-10 PROCEDURE — 2580000003 HC RX 258: Performed by: INTERNAL MEDICINE

## 2019-08-10 RX ORDER — LINEZOLID 600 MG/1
600 TABLET, FILM COATED ORAL EVERY 12 HOURS SCHEDULED
Status: DISCONTINUED | OUTPATIENT
Start: 2019-08-10 | End: 2019-08-12

## 2019-08-10 RX ORDER — OXYCODONE HYDROCHLORIDE AND ACETAMINOPHEN 5; 325 MG/1; MG/1
1 TABLET ORAL EVERY 4 HOURS PRN
Status: DISCONTINUED | OUTPATIENT
Start: 2019-08-10 | End: 2019-08-11

## 2019-08-10 RX ORDER — OXYCODONE HYDROCHLORIDE AND ACETAMINOPHEN 5; 325 MG/1; MG/1
2 TABLET ORAL EVERY 4 HOURS PRN
Status: DISCONTINUED | OUTPATIENT
Start: 2019-08-10 | End: 2019-08-11

## 2019-08-10 RX ADMIN — VANCOMYCIN HYDROCHLORIDE 1250 MG: 5 INJECTION, POWDER, LYOPHILIZED, FOR SOLUTION INTRAVENOUS at 05:16

## 2019-08-10 RX ADMIN — VANCOMYCIN HYDROCHLORIDE 1250 MG: 5 INJECTION, POWDER, LYOPHILIZED, FOR SOLUTION INTRAVENOUS at 18:46

## 2019-08-10 RX ADMIN — HYDROMORPHONE HYDROCHLORIDE 1 MG: 1 INJECTION, SOLUTION INTRAMUSCULAR; INTRAVENOUS; SUBCUTANEOUS at 21:57

## 2019-08-10 RX ADMIN — MEROPENEM 1 G: 1 INJECTION INTRAVENOUS at 21:57

## 2019-08-10 RX ADMIN — OXYCODONE HYDROCHLORIDE AND ACETAMINOPHEN 2 TABLET: 5; 325 TABLET ORAL at 12:03

## 2019-08-10 RX ADMIN — OXYCODONE HYDROCHLORIDE AND ACETAMINOPHEN 2 TABLET: 5; 325 TABLET ORAL at 20:07

## 2019-08-10 RX ADMIN — OXYCODONE HYDROCHLORIDE AND ACETAMINOPHEN 1 TABLET: 5; 325 TABLET ORAL at 05:16

## 2019-08-10 RX ADMIN — LINEZOLID 600 MG: 600 TABLET, FILM COATED ORAL at 21:57

## 2019-08-10 RX ADMIN — OXYCODONE HYDROCHLORIDE AND ACETAMINOPHEN 1 TABLET: 5; 325 TABLET ORAL at 10:53

## 2019-08-10 RX ADMIN — OXYCODONE HYDROCHLORIDE AND ACETAMINOPHEN 2 TABLET: 5; 325 TABLET ORAL at 15:49

## 2019-08-10 ASSESSMENT — PAIN SCALES - GENERAL
PAINLEVEL_OUTOF10: 10
PAINLEVEL_OUTOF10: 2
PAINLEVEL_OUTOF10: 10

## 2019-08-10 ASSESSMENT — PAIN DESCRIPTION - FREQUENCY: FREQUENCY: CONTINUOUS

## 2019-08-10 ASSESSMENT — PAIN DESCRIPTION - ORIENTATION
ORIENTATION: RIGHT
ORIENTATION: RIGHT

## 2019-08-10 ASSESSMENT — ENCOUNTER SYMPTOMS
GASTROINTESTINAL NEGATIVE: 1
RESPIRATORY NEGATIVE: 1

## 2019-08-10 ASSESSMENT — PAIN DESCRIPTION - LOCATION
LOCATION: LEG
LOCATION: LEG

## 2019-08-10 ASSESSMENT — PAIN DESCRIPTION - DESCRIPTORS: DESCRIPTORS: NAGGING;THROBBING

## 2019-08-10 NOTE — PROGRESS NOTES
This is a patient of Dr. Riri Blanco who in the middle of July had a amputation revision. I reviewed the medical records for the past few months. She came back with dehiscence of her stump and was admitted for pain control and surgical evaluation. I have reviewed her emergency department visit. Patient is complaining of a lot of pain. She has some underlying dementia according to the chart. On exam her right stump has dehisced completely except for 3 lateral sutures. I removed all the Prolene sutures which were hanging from the skin level. There is no purulence seen but there is some necrotic tissue around the entire skin dehiscence. I dressed this with a Betadine dressing. I took some images to share with any consulting physicians to prevent the need for taking this dressing off a number of times today. This cannot be placed back together in its current state. Local wound care instructions were given. I believe Dr. Xin Gutierrez will return on Monday and have discussions regarding any further revision that is required. I have discussed the case with Dr. Joan Gill.

## 2019-08-10 NOTE — PROGRESS NOTES
Physical Therapy Med Surg Initial Assessment  Facility/Department: [facility]  Room: [place] [address]       NAME: Caridad Moctezuma  : 1960 (60 y.o.)  MRN: [id number]  CODE STATUS: Full Code    Date of Service: 8/10/2019    Patient Diagnosis(es): Wound dehiscence [T81.30XA]   Chief Complaint   Patient presents with    Other     Pt sent from Dr Duane Standard office with possible wound infection after BKA     Patient Active Problem List    Diagnosis Date Noted    Hypertension 2019     Priority: High    Paroxysmal atrial fibrillation ([address] 2019     Priority: High    Wound dehiscence 2019    Diabetes mellitus ([address] 2019    PAD (peripheral artery disease) ([address] 2019    Delayed surgical wound healing of below-the-knee amputation stump ([address] 2019    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 ([address] 2019    Wound infection 2019    Dementia with aggressive behavior 01/15/2019    Lethargy 2016    Ischemia of lower extremity 2013    Cerebrovascular accident, old 2013    Angiopathy, peripheral ([address] 2013    Carotid artery disease ([address] 2013    History of amputation of lower extremity through tibia and fibula ([address] 10/14/2013    Deep vein thrombosis (DVT) of lower extremity ([address] 2013    Noncompliance with treatment 10/14/2009        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     Athscl heart disease of native coronary artery w/o ang pctrs     CAD (coronary artery disease)     Cancer (HCC)     right BKA    Constipation     Constipation     Depression     MAJOR DEPRESSIVE DISORDER    Diabetes mellitus ([address]     TYPE II    Diabetic neuropathy (HCC)     Hyperlipidemia     Hypertension     Insomnia     Muscle weakness     PVD (peripheral vascular disease) ([address] ;Decreased strength;Decreased balance  Decision Making: Medium Complexity  History: high  Exam: high  Clinical Presentation: med    Prognosis: Good    DISCHARGE RECOMMENDATIONS:  Discharge Recommendations: Continue to assess pending progress    Assessment: Pt able to complete all bed mobility indep but requires increased time and effort d/t pain in Rt LE. Pt requires SBA with sit <> stand transfers with Lt LE WBing only but is only able to tolerate standing for ~20sec before needing to sit. Pt would benefit from a few visits of skilled PT to improve her independence with transfers and standing balance. REQUIRES PT FOLLOW UP: Yes      PLAN OF CARE:  Plan  Times per week: 2-3v  Current Treatment Recommendations: Strengthening, Balance Training, Functional Mobility Training, Transfer Training, Neuromuscular Re-education, Manual Therapy - Soft Tissue Mobilization, Home Exercise Program, Safety Education & Training, Equipment Evaluation, Education, & procurement, Patient/Caregiver Education & Training  Safety Devices  Type of devices: All fall risk precautions in place, Bed alarm in place, Call light within reach    Goals:  Short term goals  Short term goal 1: Pt will be independent and safe with all bed mobility. Short term goal 2: Pt will be independent and safe with all transfers to and from a chair.     [facility] (6 CLICK) BASIC MOBILITY  AM-PAC Inpatient Mobility Raw Score : 13     Therapy Time:   Individual   Time In 1325   Time Out 1345   Minutes 20           Genesis Shell, PT, 08/10/19 at 1:48 PM

## 2019-08-10 NOTE — PROGRESS NOTES
Nutrition Assessment    Type and Reason for Visit: Initial, Positive Nutrition Screen(Wound)    Nutrition Recommendations: Modify current diet, Start ONS(Carb Control 3 Diet; Wound healing ONS BID)    Nutrition Assessment: Pt compromised from a nutrition standpoint as evidenced by the presence of wounds. At risk for further decline due to increased nutrient loses to wound healing. Will start ONS and modify diet to promote improved blood glucose control and wound healing. .    Malnutrition Assessment:  · Malnutrition Status: Insufficient data  · Context: Acute illness or injury  · Findings of the 6 clinical characteristics of malnutrition (Minimum of 2 out of 6 clinical characteristics is required to make the diagnosis of moderate or severe Protein Calorie Malnutrition based on AND/ASPEN Guidelines):  1. Energy Intake-Unable to assess, Unable to assess    2. Weight Loss-Unable to assess, unable to assess  3. Fat Loss-No significant subcutaneous fat loss,    4. Muscle Loss-No significant muscle mass loss,    5. Fluid Accumulation-No significant fluid accumulation,    6.  Strength-Not measured    Nutrition Risk Level: Moderate    Nutrient Needs:  · Estimated Daily Total Kcal: 0557-3138 (15-18 kcals/kg)  · Estimated Daily Protein (g): 71-82 (1.4-1.6 g/kg)  · Estimated Daily Total Fluid (ml/day): 1500 mL (1 mL/kcal)    Nutrition Diagnosis:   · Problem: Increased nutrient needs  · Etiology: related to Increased demand for energy/nutrients     Signs and symptoms:  as evidenced by Presence of wounds    Objective Information:  · Nutrition-Focused Physical Findings: BLE edema per nsg. No documented BM.  Hx: R AKA, L BKA, DM  · Wound Type: (R AKA)  · Current Nutrition Therapies:  · Oral Diet Orders: General   · Oral Diet intake: 26-50%  · Oral Nutrition Supplement (ONS) Orders: None  · Anthropometric Measures:  · Ht: 5' 6\" (167.6 cm)   · Admission Body Wt: 181 lb (82.1 kg)(8/9 Stated)  · Usual Body Wt: 185 lb (83.9

## 2019-08-11 LAB — VANCOMYCIN TROUGH: 22 UG/ML (ref 10–20)

## 2019-08-11 PROCEDURE — 6370000000 HC RX 637 (ALT 250 FOR IP): Performed by: COLON & RECTAL SURGERY

## 2019-08-11 PROCEDURE — 6360000002 HC RX W HCPCS: Performed by: INTERNAL MEDICINE

## 2019-08-11 PROCEDURE — 6360000002 HC RX W HCPCS: Performed by: COLON & RECTAL SURGERY

## 2019-08-11 PROCEDURE — 36415 COLL VENOUS BLD VENIPUNCTURE: CPT

## 2019-08-11 PROCEDURE — 2580000003 HC RX 258: Performed by: INTERNAL MEDICINE

## 2019-08-11 PROCEDURE — 2580000003 HC RX 258: Performed by: FAMILY MEDICINE

## 2019-08-11 PROCEDURE — 80202 ASSAY OF VANCOMYCIN: CPT

## 2019-08-11 PROCEDURE — 6360000002 HC RX W HCPCS: Performed by: FAMILY MEDICINE

## 2019-08-11 PROCEDURE — 6370000000 HC RX 637 (ALT 250 FOR IP): Performed by: INTERNAL MEDICINE

## 2019-08-11 PROCEDURE — 1210000000 HC MED SURG R&B

## 2019-08-11 PROCEDURE — 6370000000 HC RX 637 (ALT 250 FOR IP): Performed by: FAMILY MEDICINE

## 2019-08-11 PROCEDURE — 99232 SBSQ HOSP IP/OBS MODERATE 35: CPT | Performed by: INTERNAL MEDICINE

## 2019-08-11 RX ORDER — OXYCODONE AND ACETAMINOPHEN 10; 325 MG/1; MG/1
1 TABLET ORAL EVERY 4 HOURS PRN
Status: DISCONTINUED | OUTPATIENT
Start: 2019-08-11 | End: 2019-08-16 | Stop reason: HOSPADM

## 2019-08-11 RX ADMIN — LINEZOLID 600 MG: 600 TABLET, FILM COATED ORAL at 21:24

## 2019-08-11 RX ADMIN — LINEZOLID 600 MG: 600 TABLET, FILM COATED ORAL at 10:49

## 2019-08-11 RX ADMIN — MEROPENEM 1 G: 1 INJECTION INTRAVENOUS at 23:31

## 2019-08-11 RX ADMIN — ENOXAPARIN SODIUM 40 MG: 40 INJECTION SUBCUTANEOUS at 10:49

## 2019-08-11 RX ADMIN — MEROPENEM 1 G: 1 INJECTION INTRAVENOUS at 06:04

## 2019-08-11 RX ADMIN — OXYCODONE HYDROCHLORIDE AND ACETAMINOPHEN 2 TABLET: 5; 325 TABLET ORAL at 10:49

## 2019-08-11 RX ADMIN — OXYCODONE HYDROCHLORIDE AND ACETAMINOPHEN 1 TABLET: 10; 325 TABLET ORAL at 21:24

## 2019-08-11 RX ADMIN — Medication 10 ML: at 10:50

## 2019-08-11 RX ADMIN — OXYCODONE HYDROCHLORIDE AND ACETAMINOPHEN 2 TABLET: 5; 325 TABLET ORAL at 06:04

## 2019-08-11 RX ADMIN — MEROPENEM 1 G: 1 INJECTION INTRAVENOUS at 15:04

## 2019-08-11 RX ADMIN — OXYCODONE HYDROCHLORIDE AND ACETAMINOPHEN 1 TABLET: 10; 325 TABLET ORAL at 15:47

## 2019-08-11 RX ADMIN — Medication 10 ML: at 21:25

## 2019-08-11 RX ADMIN — HYDROMORPHONE HYDROCHLORIDE 1 MG: 1 INJECTION, SOLUTION INTRAMUSCULAR; INTRAVENOUS; SUBCUTANEOUS at 18:29

## 2019-08-11 RX ADMIN — HYDROMORPHONE HYDROCHLORIDE 1 MG: 1 INJECTION, SOLUTION INTRAMUSCULAR; INTRAVENOUS; SUBCUTANEOUS at 23:32

## 2019-08-11 ASSESSMENT — PAIN SCALES - GENERAL
PAINLEVEL_OUTOF10: 10
PAINLEVEL_OUTOF10: 9
PAINLEVEL_OUTOF10: 10

## 2019-08-11 ASSESSMENT — ENCOUNTER SYMPTOMS
RESPIRATORY NEGATIVE: 1
GASTROINTESTINAL NEGATIVE: 1

## 2019-08-11 NOTE — PLAN OF CARE
Skin assessment completed, patient turned every two hours, toileting offered, brief changed if needed, hourly rounding completed, call light within reach.

## 2019-08-12 ENCOUNTER — APPOINTMENT (OUTPATIENT)
Dept: INTERVENTIONAL RADIOLOGY/VASCULAR | Age: 59
End: 2019-08-12
Payer: MEDICAID

## 2019-08-12 PROCEDURE — 6370000000 HC RX 637 (ALT 250 FOR IP): Performed by: FAMILY MEDICINE

## 2019-08-12 PROCEDURE — 2580000003 HC RX 258: Performed by: EMERGENCY MEDICINE

## 2019-08-12 PROCEDURE — 6360000002 HC RX W HCPCS: Performed by: INTERNAL MEDICINE

## 2019-08-12 PROCEDURE — 0WHG33Z INSERTION OF INFUSION DEVICE INTO PERITONEAL CAVITY, PERCUTANEOUS APPROACH: ICD-10-PCS | Performed by: RADIOLOGY

## 2019-08-12 PROCEDURE — 2500000003 HC RX 250 WO HCPCS: Performed by: INTERNAL MEDICINE

## 2019-08-12 PROCEDURE — 99211 OFF/OP EST MAY X REQ PHY/QHP: CPT

## 2019-08-12 PROCEDURE — 99232 SBSQ HOSP IP/OBS MODERATE 35: CPT | Performed by: INTERNAL MEDICINE

## 2019-08-12 PROCEDURE — 36573 INSJ PICC RS&I 5 YR+: CPT | Performed by: RADIOLOGY

## 2019-08-12 PROCEDURE — 1210000000 HC MED SURG R&B

## 2019-08-12 PROCEDURE — 6370000000 HC RX 637 (ALT 250 FOR IP): Performed by: INTERNAL MEDICINE

## 2019-08-12 PROCEDURE — 2709999900 IR PICC WO SQ PORT/PUMP > 5 YEARS

## 2019-08-12 PROCEDURE — 6360000002 HC RX W HCPCS: Performed by: COLON & RECTAL SURGERY

## 2019-08-12 PROCEDURE — 2580000003 HC RX 258: Performed by: INTERNAL MEDICINE

## 2019-08-12 PROCEDURE — 2580000003 HC RX 258: Performed by: FAMILY MEDICINE

## 2019-08-12 RX ORDER — SODIUM CHLORIDE 0.9 % (FLUSH) 0.9 %
10 SYRINGE (ML) INJECTION EVERY 12 HOURS SCHEDULED
Status: DISCONTINUED | OUTPATIENT
Start: 2019-08-12 | End: 2019-08-16 | Stop reason: SDUPTHER

## 2019-08-12 RX ORDER — SODIUM CHLORIDE 0.9 % (FLUSH) 0.9 %
10 SYRINGE (ML) INJECTION PRN
Status: DISCONTINUED | OUTPATIENT
Start: 2019-08-12 | End: 2019-08-16 | Stop reason: SDUPTHER

## 2019-08-12 RX ORDER — SODIUM CHLORIDE 9 MG/ML
250 INJECTION, SOLUTION INTRAVENOUS ONCE
Status: COMPLETED | OUTPATIENT
Start: 2019-08-12 | End: 2019-08-12

## 2019-08-12 RX ORDER — LIDOCAINE HYDROCHLORIDE 20 MG/ML
5 INJECTION, SOLUTION INFILTRATION; PERINEURAL ONCE
Status: COMPLETED | OUTPATIENT
Start: 2019-08-12 | End: 2019-08-12

## 2019-08-12 RX ADMIN — Medication 10 ML: at 21:12

## 2019-08-12 RX ADMIN — Medication 3 ML: at 22:56

## 2019-08-12 RX ADMIN — MEROPENEM 1 G: 1 INJECTION INTRAVENOUS at 14:58

## 2019-08-12 RX ADMIN — OXYCODONE HYDROCHLORIDE AND ACETAMINOPHEN 1 TABLET: 10; 325 TABLET ORAL at 19:10

## 2019-08-12 RX ADMIN — Medication 10 ML: at 21:35

## 2019-08-12 RX ADMIN — SODIUM CHLORIDE 250 ML: 9 INJECTION, SOLUTION INTRAVENOUS at 09:57

## 2019-08-12 RX ADMIN — OXYCODONE HYDROCHLORIDE AND ACETAMINOPHEN 1 TABLET: 10; 325 TABLET ORAL at 10:56

## 2019-08-12 RX ADMIN — MEROPENEM 1 G: 1 INJECTION INTRAVENOUS at 06:30

## 2019-08-12 RX ADMIN — LIDOCAINE HYDROCHLORIDE 5 ML: 20 INJECTION, SOLUTION INFILTRATION; PERINEURAL at 09:58

## 2019-08-12 RX ADMIN — MEROPENEM 1 G: 1 INJECTION INTRAVENOUS at 22:52

## 2019-08-12 RX ADMIN — OXYCODONE HYDROCHLORIDE AND ACETAMINOPHEN 1 TABLET: 10; 325 TABLET ORAL at 06:30

## 2019-08-12 RX ADMIN — OXYCODONE HYDROCHLORIDE AND ACETAMINOPHEN 1 TABLET: 10; 325 TABLET ORAL at 14:58

## 2019-08-12 RX ADMIN — HYDROMORPHONE HYDROCHLORIDE 1 MG: 1 INJECTION, SOLUTION INTRAMUSCULAR; INTRAVENOUS; SUBCUTANEOUS at 15:56

## 2019-08-12 RX ADMIN — LINEZOLID 600 MG: 600 TABLET, FILM COATED ORAL at 10:56

## 2019-08-12 RX ADMIN — OXYCODONE HYDROCHLORIDE AND ACETAMINOPHEN 1 TABLET: 10; 325 TABLET ORAL at 22:55

## 2019-08-12 ASSESSMENT — PAIN SCALES - GENERAL
PAINLEVEL_OUTOF10: 10
PAINLEVEL_OUTOF10: 0
PAINLEVEL_OUTOF10: 10

## 2019-08-12 ASSESSMENT — ENCOUNTER SYMPTOMS
GASTROINTESTINAL NEGATIVE: 1
RESPIRATORY NEGATIVE: 1

## 2019-08-12 NOTE — PROGRESS NOTES
Pt assessment complete. Pt down for PICC line. Messaged Dr. Vania Dukes, he will be in to see pt. Pt complains of 10/10 pain. Medicated with percocet. Dressing on right stump is clean, dry, and intact. Will continue to monitor.

## 2019-08-12 NOTE — DISCHARGE INSTR - COC
R53.83    Ischemia of lower extremity I99.8    Cerebrovascular accident, old Z80.78    Noncompliance with treatment Z91.19    Deep vein thrombosis (DVT) of lower extremity ([facility]) I82.409    Angiopathy, peripheral ([facility]) I73.9    History of amputation of lower extremity through tibia and fibula ([facility] Z89.519    Carotid artery disease ([facility]) I77.9    Dementia with aggressive behavior F03.91    Wound infection T14. 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 ([facility]) L97.205    PAD (peripheral artery disease) ([facility]) I73.9    Hypertension I10    Diabetes mellitus ([facility]) E11.9    Paroxysmal atrial fibrillation ([facility]) I48.0    Wound dehiscence T81.30XA       Isolation/Infection:   Isolation          No Isolation            Nurse Assessment:  Last Vital Signs: BP (!) 129/56   Pulse 74   Temp 98.2 °F (36.8 °C)   Resp 18   Ht 5' 6\" (1.676 m)   Wt 185 lb (83.9 kg)   SpO2 97%   BMI 29.86 kg/m²     Last documented pain score (0-10 scale): Pain Level: 10  Last Weight:   Wt Readings from Last 1 Encounters:   08/09/19 185 lb (83.9 kg)     Mental Status:  oriented, alert, coherent, logical and thought processes intact    IV Access:  - PICC - site  R Upper Arm, insertion date: 8-12-19    Nursing Mobility/ADLs:  Walking   Dependent  Transfer  Assisted  Bathing  Assisted  Dressing  Assisted  Toileting  Assisted  Feeding  Independent  Med Admin  Independent  Med Delivery   whole    Wound Care Documentation and Therapy:  Wound 06/07/19 Leg Medial;Right #1 right BKA (Active)   Wound Non-Healing Surgical 8/11/2019  9:30 PM   Dressing Status Clean;Dry; Intact 8/11/2019  9:30 PM   Dressing Changed Changed/New 8/11/2019  2:29 PM   Dressing/Treatment Moist to dry; Ace Wrap;ABD 8/11/2019  9:30 PM   Wound Cleansed Rinsed/Irrigated with saline 8/10/2019  8:30 PM   Wound Length (cm) 15 cm 8/9/2019 10:33 PM   Wound Width (cm) 5 cm 8/9/2019 10:33 PM   Wound Depth (cm) 4 cm 8/9/2019 10:33 PM   Wound Surface Area (cm^2) 75 cm^2 8/9/2019 10:33 PM   Change in Wound Size % (l*w) -212.5 8/9/2019 10:33 PM   Wound Volume (cm^3) 300 cm^3 8/9/2019 10:33 PM   Wound Healing % -1150 8/9/2019 10:33 PM   Wound Assessment Painful 8/11/2019  9:30 PM   Drainage Amount None 8/11/2019  9:30 PM   Drainage Description Serosanguinous 8/11/2019  2:29 PM   Odor None 8/11/2019  9:30 PM   Margins Defined edges 8/11/2019  2:29 PM   Chani-wound Assessment Painful 8/11/2019  9:30 PM   Non-staged Wound Description Full thickness 8/11/2019  2:29 PM   Red%Wound Bed 30 8/11/2019  9:30 PM   Yellow%Wound Bed 60 8/11/2019  9:30 PM   Black%Wound Bed 10 8/11/2019  9:30 PM   Number of days: 65        Elimination:  Continence:   · Bowel: Yes  · Bladder: Yes  Urinary Catheter: Indication for Use of Catheter: Perioperative use in slected surgeries including but not limited to urologic, pelvic or need for intraoperative monitorin of urinary output due to prolonged surgery, large volume infusion or need for diuretic therapy in surgery   Colostomy/Ileostomy/Ileal Conduit: No       Date of Last BM: 8-16-19    Intake/Output Summary (Last 24 hours) at 8/12/2019 1149  Last data filed at 8/12/2019 0631  Gross per 24 hour   Intake 560 ml   Output 1100 ml   Net -540 ml     I/O last 3 completed shifts: In: 12 [P.O.:360; I.V.:200]  Out: 1300 [Urine:1300]    Safety Concerns: At Risk for Falls    Impairments/Disabilities:      Amputation - R AKA    Nutrition Therapy:  Current Nutrition Therapy:   - Oral Diet:  General    Routes of Feeding: Oral  Liquids: Thin Liquids  Daily Fluid Restriction: no  Last Modified Barium Swallow with Video (Video Swallowing Test): not done    Treatments at the Time of Hospital Discharge:   Respiratory Treatments: PRN  Oxygen Therapy:  is not on home oxygen therapy.   Ventilator:    - No ventilator support    Rehab Therapies: Physical Therapy and Occupational Therapy  Weight Bearing Status/Restrictions: No weight bearing restirctions  Other Medical Equipment (for information only, NOT a DME order):  walker  Other Treatments: Wrap 12\" Gauze around R AKA stump and avoid tape directly to skin. Change this Q Daily. Patient's personal belongings (please select all that are sent with patient):  Jewelry, Clothing    RN SIGNATURE:  Electronically signed by Wayna Moritz, RN on 8/16/19 at 4:03 PM    CASE MANAGEMENT/SOCIAL WORK SECTION    Inpatient Status Date: ***    Readmission Risk Assessment Score:  Readmission Risk              Risk of Unplanned Readmission:        19           Discharging to Facility/ Agency   · Name: [facility]  · Address:  · Phone:[phone]  · Fax:    Dialysis Facility (if applicable)   · Name:  · Address:  · Dialysis Schedule:  · Phone:  · Fax:    / signature: Electronically signed by SHRUTHI Cox on 8/12/19 at 11:50 AM    PHYSICIAN SECTION    Prognosis: Good    Condition at Discharge: Stable    Rehab Potential (if transferring to Rehab): Good    Recommended Labs or Other Treatments After Discharge: vancomycin and meropenem 4 weeks, labs qweek per Dr. Marlon Sewell    Physician Certification: I certify the above information and transfer of Brigitte Brito  is necessary for the continuing treatment of the diagnosis listed and that she requires [facility] for greater 30 days.      Update Admission H&P: No change in H&P    PHYSICIAN SIGNATURE:  Electronically signed by Kathy Bledsoe MD on 8/16/19 at 2:28 PM

## 2019-08-13 ENCOUNTER — ANESTHESIA (OUTPATIENT)
Dept: OPERATING ROOM | Age: 59
End: 2019-08-13
Payer: MEDICAID

## 2019-08-13 ENCOUNTER — ANESTHESIA EVENT (OUTPATIENT)
Dept: OPERATING ROOM | Age: 59
End: 2019-08-13
Payer: MEDICAID

## 2019-08-13 VITALS — OXYGEN SATURATION: 83 % | TEMPERATURE: 98.4 F | SYSTOLIC BLOOD PRESSURE: 141 MMHG | DIASTOLIC BLOOD PRESSURE: 65 MMHG

## 2019-08-13 LAB
ANAEROBIC CULTURE: ABNORMAL
GRAM STAIN RESULT: ABNORMAL
ORGANISM: ABNORMAL
WOUND/ABSCESS: ABNORMAL

## 2019-08-13 PROCEDURE — 2580000003 HC RX 258: Performed by: THORACIC SURGERY (CARDIOTHORACIC VASCULAR SURGERY)

## 2019-08-13 PROCEDURE — 87205 SMEAR GRAM STAIN: CPT

## 2019-08-13 PROCEDURE — 2580000003 HC RX 258: Performed by: INTERNAL MEDICINE

## 2019-08-13 PROCEDURE — 3600000003 HC SURGERY LEVEL 3 BASE: Performed by: THORACIC SURGERY (CARDIOTHORACIC VASCULAR SURGERY)

## 2019-08-13 PROCEDURE — 6360000002 HC RX W HCPCS: Performed by: INTERNAL MEDICINE

## 2019-08-13 PROCEDURE — 87185 SC STD ENZYME DETCJ PER NZM: CPT

## 2019-08-13 PROCEDURE — 87070 CULTURE OTHR SPECIMN AEROBIC: CPT

## 2019-08-13 PROCEDURE — 3600000013 HC SURGERY LEVEL 3 ADDTL 15MIN: Performed by: THORACIC SURGERY (CARDIOTHORACIC VASCULAR SURGERY)

## 2019-08-13 PROCEDURE — 2500000003 HC RX 250 WO HCPCS: Performed by: THORACIC SURGERY (CARDIOTHORACIC VASCULAR SURGERY)

## 2019-08-13 PROCEDURE — 0Y6C0Z3 DETACHMENT AT RIGHT UPPER LEG, LOW, OPEN APPROACH: ICD-10-PCS | Performed by: THORACIC SURGERY (CARDIOTHORACIC VASCULAR SURGERY)

## 2019-08-13 PROCEDURE — 97110 THERAPEUTIC EXERCISES: CPT

## 2019-08-13 PROCEDURE — 2580000003 HC RX 258: Performed by: EMERGENCY MEDICINE

## 2019-08-13 PROCEDURE — 76942 ECHO GUIDE FOR BIOPSY: CPT | Performed by: ANESTHESIOLOGY

## 2019-08-13 PROCEDURE — 6360000002 HC RX W HCPCS: Performed by: COLON & RECTAL SURGERY

## 2019-08-13 PROCEDURE — 2720000010 HC SURG SUPPLY STERILE: Performed by: THORACIC SURGERY (CARDIOTHORACIC VASCULAR SURGERY)

## 2019-08-13 PROCEDURE — 3E0T33Z INTRODUCTION OF ANTI-INFLAMMATORY INTO PERIPHERAL NERVES AND PLEXI, PERCUTANEOUS APPROACH: ICD-10-PCS | Performed by: THORACIC SURGERY (CARDIOTHORACIC VASCULAR SURGERY)

## 2019-08-13 PROCEDURE — 2000000000 HC ICU R&B

## 2019-08-13 PROCEDURE — 88311 DECALCIFY TISSUE: CPT

## 2019-08-13 PROCEDURE — 3700000000 HC ANESTHESIA ATTENDED CARE: Performed by: THORACIC SURGERY (CARDIOTHORACIC VASCULAR SURGERY)

## 2019-08-13 PROCEDURE — 3700000001 HC ADD 15 MINUTES (ANESTHESIA): Performed by: THORACIC SURGERY (CARDIOTHORACIC VASCULAR SURGERY)

## 2019-08-13 PROCEDURE — 87077 CULTURE AEROBIC IDENTIFY: CPT

## 2019-08-13 PROCEDURE — 87147 CULTURE TYPE IMMUNOLOGIC: CPT

## 2019-08-13 PROCEDURE — 2709999900 HC NON-CHARGEABLE SUPPLY: Performed by: THORACIC SURGERY (CARDIOTHORACIC VASCULAR SURGERY)

## 2019-08-13 PROCEDURE — 3E1038Z IRRIGATION OF SKIN AND MUCOUS MEMBRANES USING IRRIGATING SUBSTANCE, PERCUTANEOUS APPROACH: ICD-10-PCS | Performed by: THORACIC SURGERY (CARDIOTHORACIC VASCULAR SURGERY)

## 2019-08-13 PROCEDURE — 87075 CULTR BACTERIA EXCEPT BLOOD: CPT

## 2019-08-13 PROCEDURE — 88307 TISSUE EXAM BY PATHOLOGIST: CPT

## 2019-08-13 PROCEDURE — 2500000003 HC RX 250 WO HCPCS: Performed by: ANESTHESIOLOGY

## 2019-08-13 PROCEDURE — 99232 SBSQ HOSP IP/OBS MODERATE 35: CPT | Performed by: INTERNAL MEDICINE

## 2019-08-13 PROCEDURE — 87186 SC STD MICRODIL/AGAR DIL: CPT

## 2019-08-13 PROCEDURE — 97535 SELF CARE MNGMENT TRAINING: CPT

## 2019-08-13 PROCEDURE — 2580000003 HC RX 258: Performed by: NURSE ANESTHETIST, CERTIFIED REGISTERED

## 2019-08-13 PROCEDURE — 6360000002 HC RX W HCPCS: Performed by: NURSE ANESTHETIST, CERTIFIED REGISTERED

## 2019-08-13 PROCEDURE — 6360000002 HC RX W HCPCS: Performed by: THORACIC SURGERY (CARDIOTHORACIC VASCULAR SURGERY)

## 2019-08-13 PROCEDURE — 88305 TISSUE EXAM BY PATHOLOGIST: CPT

## 2019-08-13 PROCEDURE — 6360000002 HC RX W HCPCS: Performed by: ANESTHESIOLOGY

## 2019-08-13 RX ORDER — SODIUM CHLORIDE, SODIUM LACTATE, POTASSIUM CHLORIDE, CALCIUM CHLORIDE 600; 310; 30; 20 MG/100ML; MG/100ML; MG/100ML; MG/100ML
INJECTION, SOLUTION INTRAVENOUS CONTINUOUS PRN
Status: DISCONTINUED | OUTPATIENT
Start: 2019-08-13 | End: 2019-08-13 | Stop reason: SDUPTHER

## 2019-08-13 RX ORDER — SODIUM CHLORIDE 9 MG/ML
INJECTION, SOLUTION INTRAVENOUS CONTINUOUS
Status: DISPENSED | OUTPATIENT
Start: 2019-08-13 | End: 2019-08-14

## 2019-08-13 RX ORDER — FENTANYL CITRATE 50 UG/ML
INJECTION, SOLUTION INTRAMUSCULAR; INTRAVENOUS PRN
Status: DISCONTINUED | OUTPATIENT
Start: 2019-08-13 | End: 2019-08-13 | Stop reason: SDUPTHER

## 2019-08-13 RX ORDER — ONDANSETRON 2 MG/ML
INJECTION INTRAMUSCULAR; INTRAVENOUS PRN
Status: DISCONTINUED | OUTPATIENT
Start: 2019-08-13 | End: 2019-08-13 | Stop reason: SDUPTHER

## 2019-08-13 RX ORDER — MAGNESIUM HYDROXIDE 1200 MG/15ML
LIQUID ORAL CONTINUOUS PRN
Status: COMPLETED | OUTPATIENT
Start: 2019-08-13 | End: 2019-08-13

## 2019-08-13 RX ORDER — FENTANYL CITRATE 50 UG/ML
50 INJECTION, SOLUTION INTRAMUSCULAR; INTRAVENOUS EVERY 10 MIN PRN
Status: DISCONTINUED | OUTPATIENT
Start: 2019-08-13 | End: 2019-08-13 | Stop reason: HOSPADM

## 2019-08-13 RX ORDER — LIDOCAINE HYDROCHLORIDE 20 MG/ML
INJECTION, SOLUTION EPIDURAL; INFILTRATION; INTRACAUDAL; PERINEURAL PRN
Status: DISCONTINUED | OUTPATIENT
Start: 2019-08-13 | End: 2019-08-13 | Stop reason: SDUPTHER

## 2019-08-13 RX ORDER — HYDROCODONE BITARTRATE AND ACETAMINOPHEN 5; 325 MG/1; MG/1
2 TABLET ORAL PRN
Status: DISCONTINUED | OUTPATIENT
Start: 2019-08-13 | End: 2019-08-13 | Stop reason: HOSPADM

## 2019-08-13 RX ORDER — DIPHENHYDRAMINE HYDROCHLORIDE 50 MG/ML
12.5 INJECTION INTRAMUSCULAR; INTRAVENOUS
Status: DISCONTINUED | OUTPATIENT
Start: 2019-08-13 | End: 2019-08-13 | Stop reason: HOSPADM

## 2019-08-13 RX ORDER — ONDANSETRON 2 MG/ML
4 INJECTION INTRAMUSCULAR; INTRAVENOUS
Status: DISCONTINUED | OUTPATIENT
Start: 2019-08-13 | End: 2019-08-13 | Stop reason: HOSPADM

## 2019-08-13 RX ORDER — SODIUM CHLORIDE, SODIUM LACTATE, POTASSIUM CHLORIDE, CALCIUM CHLORIDE 600; 310; 30; 20 MG/100ML; MG/100ML; MG/100ML; MG/100ML
INJECTION, SOLUTION INTRAVENOUS
Status: COMPLETED
Start: 2019-08-13 | End: 2019-08-13

## 2019-08-13 RX ORDER — METOCLOPRAMIDE HYDROCHLORIDE 5 MG/ML
10 INJECTION INTRAMUSCULAR; INTRAVENOUS
Status: DISCONTINUED | OUTPATIENT
Start: 2019-08-13 | End: 2019-08-13 | Stop reason: HOSPADM

## 2019-08-13 RX ORDER — SODIUM CHLORIDE 0.9 % (FLUSH) 0.9 %
10 SYRINGE (ML) INJECTION PRN
Status: DISCONTINUED | OUTPATIENT
Start: 2019-08-13 | End: 2019-08-16 | Stop reason: HOSPADM

## 2019-08-13 RX ORDER — SODIUM CHLORIDE 0.9 % (FLUSH) 0.9 %
10 SYRINGE (ML) INJECTION EVERY 12 HOURS SCHEDULED
Status: DISCONTINUED | OUTPATIENT
Start: 2019-08-13 | End: 2019-08-16 | Stop reason: HOSPADM

## 2019-08-13 RX ORDER — PROPOFOL 10 MG/ML
INJECTION, EMULSION INTRAVENOUS PRN
Status: DISCONTINUED | OUTPATIENT
Start: 2019-08-13 | End: 2019-08-13 | Stop reason: SDUPTHER

## 2019-08-13 RX ORDER — 0.9 % SODIUM CHLORIDE 0.9 %
500 INTRAVENOUS SOLUTION INTRAVENOUS ONCE
Status: COMPLETED | OUTPATIENT
Start: 2019-08-13 | End: 2019-08-13

## 2019-08-13 RX ORDER — MEPERIDINE HYDROCHLORIDE 25 MG/ML
12.5 INJECTION INTRAMUSCULAR; INTRAVENOUS; SUBCUTANEOUS EVERY 5 MIN PRN
Status: DISCONTINUED | OUTPATIENT
Start: 2019-08-13 | End: 2019-08-13 | Stop reason: HOSPADM

## 2019-08-13 RX ORDER — LIDOCAINE HYDROCHLORIDE 20 MG/ML
INJECTION, SOLUTION INTRAVENOUS PRN
Status: DISCONTINUED | OUTPATIENT
Start: 2019-08-13 | End: 2019-08-13 | Stop reason: SDUPTHER

## 2019-08-13 RX ORDER — HYDROCODONE BITARTRATE AND ACETAMINOPHEN 5; 325 MG/1; MG/1
1 TABLET ORAL PRN
Status: DISCONTINUED | OUTPATIENT
Start: 2019-08-13 | End: 2019-08-13 | Stop reason: HOSPADM

## 2019-08-13 RX ORDER — ROPIVACAINE HYDROCHLORIDE 5 MG/ML
INJECTION, SOLUTION EPIDURAL; INFILTRATION; PERINEURAL PRN
Status: DISCONTINUED | OUTPATIENT
Start: 2019-08-13 | End: 2019-08-13 | Stop reason: SDUPTHER

## 2019-08-13 RX ADMIN — MEROPENEM 1 G: 1 INJECTION INTRAVENOUS at 22:55

## 2019-08-13 RX ADMIN — ROPIVACAINE HYDROCHLORIDE 25 ML: 5 INJECTION, SOLUTION EPIDURAL; INFILTRATION; PERINEURAL at 14:07

## 2019-08-13 RX ADMIN — HYDROMORPHONE HYDROCHLORIDE 0.5 MG: 1 INJECTION, SOLUTION INTRAMUSCULAR; INTRAVENOUS; SUBCUTANEOUS at 17:29

## 2019-08-13 RX ADMIN — FENTANYL CITRATE 50 MCG: 50 INJECTION, SOLUTION INTRAMUSCULAR; INTRAVENOUS at 14:53

## 2019-08-13 RX ADMIN — HYDROMORPHONE HYDROCHLORIDE 0.5 MG: 1 INJECTION, SOLUTION INTRAMUSCULAR; INTRAVENOUS; SUBCUTANEOUS at 20:51

## 2019-08-13 RX ADMIN — HYDROMORPHONE HYDROCHLORIDE 0.5 MG: 1 INJECTION, SOLUTION INTRAMUSCULAR; INTRAVENOUS; SUBCUTANEOUS at 03:09

## 2019-08-13 RX ADMIN — MEROPENEM 1 G: 1 INJECTION INTRAVENOUS at 06:31

## 2019-08-13 RX ADMIN — PROPOFOL 100 MG: 10 INJECTION, EMULSION INTRAVENOUS at 15:11

## 2019-08-13 RX ADMIN — ONDANSETRON 4 MG: 2 INJECTION INTRAMUSCULAR; INTRAVENOUS at 16:05

## 2019-08-13 RX ADMIN — HYDROMORPHONE HYDROCHLORIDE 1 MG: 1 INJECTION, SOLUTION INTRAMUSCULAR; INTRAVENOUS; SUBCUTANEOUS at 13:03

## 2019-08-13 RX ADMIN — FENTANYL CITRATE 25 MCG: 50 INJECTION, SOLUTION INTRAMUSCULAR; INTRAVENOUS at 15:31

## 2019-08-13 RX ADMIN — LIDOCAINE HYDROCHLORIDE 5 ML: 20 INJECTION, SOLUTION EPIDURAL; INFILTRATION; INTRACAUDAL; PERINEURAL at 14:07

## 2019-08-13 RX ADMIN — FENTANYL CITRATE 50 MCG: 50 INJECTION, SOLUTION INTRAMUSCULAR; INTRAVENOUS at 15:11

## 2019-08-13 RX ADMIN — PROPOFOL 100 MG: 10 INJECTION, EMULSION INTRAVENOUS at 14:53

## 2019-08-13 RX ADMIN — SODIUM CHLORIDE, POTASSIUM CHLORIDE, SODIUM LACTATE AND CALCIUM CHLORIDE: 600; 310; 30; 20 INJECTION, SOLUTION INTRAVENOUS at 14:47

## 2019-08-13 RX ADMIN — FENTANYL CITRATE 50 MCG: 50 INJECTION, SOLUTION INTRAMUSCULAR; INTRAVENOUS at 15:39

## 2019-08-13 RX ADMIN — HYDROMORPHONE HYDROCHLORIDE 0.5 MG: 1 INJECTION, SOLUTION INTRAMUSCULAR; INTRAVENOUS; SUBCUTANEOUS at 06:32

## 2019-08-13 RX ADMIN — FENTANYL CITRATE 25 MCG: 50 INJECTION, SOLUTION INTRAMUSCULAR; INTRAVENOUS at 16:07

## 2019-08-13 RX ADMIN — HYDROMORPHONE HYDROCHLORIDE 1 MG: 1 INJECTION, SOLUTION INTRAMUSCULAR; INTRAVENOUS; SUBCUTANEOUS at 09:43

## 2019-08-13 RX ADMIN — SODIUM CHLORIDE: 9 INJECTION, SOLUTION INTRAVENOUS at 23:45

## 2019-08-13 RX ADMIN — SODIUM CHLORIDE: 9 INJECTION, SOLUTION INTRAVENOUS at 17:18

## 2019-08-13 RX ADMIN — DEXMEDETOMIDINE 0.4 MCG/KG/HR: 100 INJECTION, SOLUTION, CONCENTRATE INTRAVENOUS at 17:19

## 2019-08-13 RX ADMIN — SODIUM CHLORIDE 500 ML: 9 INJECTION, SOLUTION INTRAVENOUS at 22:55

## 2019-08-13 RX ADMIN — Medication 3 ML: at 17:37

## 2019-08-13 RX ADMIN — MEROPENEM 1 G: 1 INJECTION INTRAVENOUS at 15:04

## 2019-08-13 RX ADMIN — LIDOCAINE HYDROCHLORIDE 50 MG: 20 INJECTION, SOLUTION INTRAVENOUS at 14:53

## 2019-08-13 ASSESSMENT — PAIN SCALES - GENERAL
PAINLEVEL_OUTOF10: 9
PAINLEVEL_OUTOF10: 10
PAINLEVEL_OUTOF10: 0
PAINLEVEL_OUTOF10: 10
PAINLEVEL_OUTOF10: 0
PAINLEVEL_OUTOF10: 10
PAINLEVEL_OUTOF10: 0
PAINLEVEL_OUTOF10: 3
PAINLEVEL_OUTOF10: 10
PAINLEVEL_OUTOF10: 0

## 2019-08-13 ASSESSMENT — PULMONARY FUNCTION TESTS
PIF_VALUE: 4
PIF_VALUE: 15
PIF_VALUE: 15
PIF_VALUE: 1
PIF_VALUE: 4
PIF_VALUE: 4
PIF_VALUE: 1
PIF_VALUE: 1
PIF_VALUE: 5
PIF_VALUE: 9
PIF_VALUE: 4
PIF_VALUE: 3
PIF_VALUE: 4
PIF_VALUE: 1
PIF_VALUE: 4
PIF_VALUE: 5
PIF_VALUE: 12
PIF_VALUE: 4
PIF_VALUE: 5
PIF_VALUE: 4
PIF_VALUE: 5
PIF_VALUE: 3
PIF_VALUE: 15
PIF_VALUE: 5
PIF_VALUE: 10
PIF_VALUE: 4
PIF_VALUE: 5
PIF_VALUE: 8
PIF_VALUE: 4
PIF_VALUE: 5
PIF_VALUE: 5
PIF_VALUE: 0
PIF_VALUE: 4
PIF_VALUE: 1
PIF_VALUE: 4
PIF_VALUE: 4
PIF_VALUE: 5
PIF_VALUE: 1
PIF_VALUE: 0
PIF_VALUE: 14
PIF_VALUE: 6
PIF_VALUE: 5
PIF_VALUE: 14
PIF_VALUE: 4
PIF_VALUE: 0
PIF_VALUE: 4
PIF_VALUE: 10
PIF_VALUE: 6
PIF_VALUE: 0
PIF_VALUE: 1
PIF_VALUE: 5
PIF_VALUE: 4
PIF_VALUE: 6
PIF_VALUE: 5
PIF_VALUE: 1
PIF_VALUE: 3
PIF_VALUE: 4
PIF_VALUE: 5
PIF_VALUE: 14
PIF_VALUE: 1
PIF_VALUE: 5
PIF_VALUE: 5
PIF_VALUE: 3
PIF_VALUE: 4
PIF_VALUE: 10
PIF_VALUE: 5
PIF_VALUE: 5
PIF_VALUE: 4
PIF_VALUE: 3
PIF_VALUE: 0
PIF_VALUE: 14
PIF_VALUE: 14
PIF_VALUE: 4
PIF_VALUE: 5
PIF_VALUE: 4
PIF_VALUE: 14
PIF_VALUE: 0
PIF_VALUE: 4
PIF_VALUE: 5
PIF_VALUE: 5
PIF_VALUE: 4
PIF_VALUE: 5
PIF_VALUE: 4
PIF_VALUE: 6
PIF_VALUE: 14
PIF_VALUE: 1
PIF_VALUE: 4
PIF_VALUE: 5
PIF_VALUE: 5
PIF_VALUE: 3
PIF_VALUE: 16
PIF_VALUE: 15
PIF_VALUE: 2
PIF_VALUE: 4
PIF_VALUE: 4
PIF_VALUE: 5
PIF_VALUE: 5
PIF_VALUE: 0
PIF_VALUE: 4
PIF_VALUE: 4
PIF_VALUE: 5
PIF_VALUE: 4
PIF_VALUE: 6
PIF_VALUE: 14
PIF_VALUE: 5
PIF_VALUE: 14

## 2019-08-13 ASSESSMENT — PAIN DESCRIPTION - PAIN TYPE
TYPE: ACUTE PAIN
TYPE: ACUTE PAIN;SURGICAL PAIN
TYPE: SURGICAL PAIN

## 2019-08-13 ASSESSMENT — ENCOUNTER SYMPTOMS
GASTROINTESTINAL NEGATIVE: 1
RESPIRATORY NEGATIVE: 1

## 2019-08-13 ASSESSMENT — PAIN DESCRIPTION - PROGRESSION: CLINICAL_PROGRESSION: RAPIDLY WORSENING

## 2019-08-13 ASSESSMENT — PAIN DESCRIPTION - LOCATION
LOCATION: LEG

## 2019-08-13 ASSESSMENT — LIFESTYLE VARIABLES: SMOKING_STATUS: 1

## 2019-08-13 ASSESSMENT — PAIN DESCRIPTION - ORIENTATION
ORIENTATION: RIGHT

## 2019-08-13 ASSESSMENT — PAIN DESCRIPTION - FREQUENCY: FREQUENCY: CONTINUOUS

## 2019-08-13 ASSESSMENT — PAIN DESCRIPTION - ONSET: ONSET: ON-GOING

## 2019-08-13 NOTE — PROGRESS NOTES
1700 Arrived from OR via bed to ICU-5, Patient Alert to person and place but not time, cont to just moan when questions asked. Easily agitated when trying to reposition her or help her in any way. Right leg stump dressing was saturated with bright red drainage, reinforced with ABD and wrapped. Reasurance given and oriented to room and bed controls. 1 Dr. Saji Caldera notified regarding drsing being saturated and that the dressing was not removed just reinforced, no new orders received. [phone] Patient remains very restless and agitated, Precedex was started and titrated per order, also med with prn dilaudid for C/O pain. Bedside swallow eval done and patient martinez well, no coughing and or choking noted  1815 Dr. Saji Caldera called this nurse to check on patients dressing update given. OK to reinforce dressing but do no remove his original dressing. 1830 Resting at present with eyes closed. No changes in assessment.

## 2019-08-13 NOTE — CONSULTS
deficiency     Weakness            Past Surgical History:   Procedure Laterality Date    AMPUTATION ABOVE KNEE Right 7/19/2019    RIGHT ABOVE KNEE LEG AMPUTATION performed by Leta Marie MD at [facility] ARTERIOGRAM Right 6/11/2019    RIGHT ARM AORTO FEMORAL DIGITAL SUBTRACTION ARTERIOGRAM performed by Leta Marie MD at [facility] Right 6/11/2019    REVISION AND REVASCULARIZATION RIGHT LOWER EXTREMITY performed by Leta Marie MD at [facility] Right          No current facility-administered medications on file prior to encounter. Current Outpatient Medications on File Prior to Encounter   Medication Sig Dispense Refill    ferrous sulfate 325 (65 Fe) MG tablet Take 325 mg by mouth every 12 hours      folic acid (FOLVITE) 1 MG tablet Take 1 mg by mouth daily      divalproex (DEPAKOTE ER) 250 MG extended release tablet Take 250 mg by mouth 3 times daily      apixaban (ELIQUIS) 5 MG TABS tablet Take 1 tablet by mouth 2 times daily 60 tablet 3    losartan (COZAAR) 25 MG tablet Take 1 tablet by mouth daily 30 tablet 3    Lactobacillus (ACIDOPHILUS) CAPS capsule Take 1 capsule by mouth daily      oxyCODONE-acetaminophen (PERCOCET) 5-325 MG per tablet Take 1 tablet by mouth every 8 hours as needed for Pain.        vitamin C (ASCORBIC ACID) 500 MG tablet Take 500 mg by mouth daily      potassium chloride (MICRO-K) 10 MEQ extended release capsule Take 10 mEq by mouth daily      DULoxetine (CYMBALTA) 30 MG extended release capsule Take 1 capsule by mouth daily 30 capsule 3    phenytoin (DILANTIN) 100 MG ER capsule Take 200 mg by mouth 2 times daily MORNING AND AT BEDTIME      traZODone (DESYREL) 50 MG tablet Take 50 mg by mouth nightly      docusate sodium (COLACE) 100 MG capsule Take 100 mg by mouth every morning      aspirin 81 MG chewable tablet Take 81 mg by mouth daily      atorvastatin (LIPITOR) 10 MG tablet Take 10 mg by mouth nightly      
in the ER pulse 88, right arm  sitting blood pressure is 134/88, and respirations unlabored. GENERAL:  She appears her stated age. She is normocephalic. NECK:  She has no audible carotid bruits. HEART:  Her breath sounds are clear. PRECORDIUM:  S1, S2 intact. No murmur. ABDOMEN:  Soft. VAGINAL AND RECTAL EXAMS:  Deferred to Dr. Ethan Hernandez at the nursing  home. MUSCULOSKELETAL SYSTEM:  Features a traumatic dehiscence of the right  above-the-knee stump with necrotic muscle. NEURO EVALUATION:  No facial asymmetry, aphasic. In essence, this patient who was admitted on the 08/08 with a hemoglobin  of 11.8 and platelet count of 532,154 will undergo surgery tomorrow  under general anesthetic for purposes of repairing the right  above-the-knee amputation defining more cultures for antibiotic therapy  and care supporting her in terms of this recent event. I thank the  hospitalist, Dr. Melchor Sebastian at all for this opportunity to render an  opinion regarding care at the patient. Total time spent in rendering  this consultation has been 30 minutes.         Dave Davison MD    D: 08/12/2019 16:06:36       T: 08/12/2019 16:10:14     [email or web address]  Job#: [id number]     Doc#: [id number]    CC:
on file   Relationships    Social connections:     Talks on phone: Not on file     Gets together: Not on file     Attends Taoism service: Not on file     Active member of club or organization: Not on file     Attends meetings of clubs or organizations: Not on file     Relationship status: Not on file    Intimate partner violence:     Fear of current or ex partner: Not on file     Emotionally abused: Not on file     Physically abused: Not on file     Forced sexual activity: Not on file   Other Topics Concern    Not on file   Social History Narrative    Not on file       History reviewed. No pertinent family history.     Review Of Systems:   CONSTITUTIONAL:  negative for  fevers, chills and sweats  HEENT:  negative for  hearing loss, tinnitus and ear drainage  RESPIRATORY:  negative for  dry cough, cough with sputum and dyspnea  CARDIOVASCULAR:  negative for  chest pain, dyspnea, palpitations  GASTROINTESTINAL:  negative for nausea, vomiting and change in bowel habits  MUSCULOSKELETAL:  negative for  myalgias, arthralgias and muscle weakness  NEUROLOGICAL:  negative for headaches, dizziness and seizures  BEHAVIOR/PSYCH:  negative for poor appetite, increased appetite and decreased sleep    Physical Exam:  Vitals:    08/13/19 0708 08/13/19 1330 08/13/19 1655 08/13/19 1728   BP: (!) 140/68 (!) 141/77 (!) 157/119 138/80   Pulse: 89 96 111 108   Resp:   20 14   Temp: 98.2 °F (36.8 °C) 97.9 °F (36.6 °C) 97.8 °F (36.6 °C)    TempSrc: Oral Temporal     SpO2: 98% 99% 98% 99%   Weight:       Height:           CONSTITUTIONAL:  awake, alert, cooperative, no apparent distress, and appears stated age  NECK:  Supple, symmetrical, trachea midline, no adenopathy, thyroid symmetric, not enlarged and no tenderness, skin normal  BACK:  Symmetric, no curvature, spinous processes are non-tender on palpation, paraspinous muscles are non-tender on palpation, no costal vertebral tenderness  LUNGS:  No increased work of breathing, good

## 2019-08-13 NOTE — PROGRESS NOTES
Physical Therapy Med Surg Daily Treatment Note  Facility/Department: [facility]  [id number]       NAME: Arianna Fernandez  : 1960 (60 y.o.)  MRN: [id number]  CODE STATUS: Full Code    Date of Service: 2019    Patient Diagnosis(es): Wound dehiscence [T81.30XA]   Chief Complaint   Patient presents with    Other     Pt sent from Dr Darrell Nix office with possible wound infection after BKA     Patient Active Problem List    Diagnosis Date Noted    Hypertension 2019     Priority: High    Paroxysmal atrial fibrillation ([address] 2019     Priority: High    Wound dehiscence 2019    Diabetes mellitus ([address] 2019    PAD (peripheral artery disease) ([address] 2019    Delayed surgical wound healing of below-the-knee amputation stump ([address] 2019    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 ([address] 2019    Wound infection 2019    Dementia with aggressive behavior 01/15/2019    Lethargy 2016    Ischemia of lower extremity 2013    Cerebrovascular accident, old 2013    Angiopathy, peripheral ([address] 2013    Carotid artery disease ([address] 2013    History of amputation of lower extremity through tibia and fibula ([address] 10/14/2013    Deep vein thrombosis (DVT) of lower extremity ([address] 2013    Noncompliance with treatment 10/14/2009        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     Athscl heart disease of native coronary artery w/o ang pctrs     CAD (coronary artery disease)     Cancer (HCC)     right BKA    Constipation     Constipation     Depression     MAJOR DEPRESSIVE DISORDER    Diabetes mellitus ([address]     TYPE II    Diabetic neuropathy (HCC)     Hyperlipidemia     Hypertension     Insomnia     Muscle weakness     PVD (peripheral vascular disease) ([address]

## 2019-08-13 NOTE — ANESTHESIA PRE PROCEDURE
Department of Anesthesiology  Preprocedure Note       Name:  Tarik Love   Age:  60 y.o.  :  1960                                          MRN:  [id number]         Date:  2019      Surgeon: Yonathan Mayo):  Isela Sood MD    Procedure: REVISION AND DEBRIDEMENT OF RIGHT AKA  (Right )    Medications prior to admission:   Prior to Admission medications    Medication Sig Start Date End Date Taking? Authorizing Provider   ferrous sulfate 325 (65 Fe) MG tablet Take 325 mg by mouth every 12 hours    Historical Provider, MD   folic acid (FOLVITE) 1 MG tablet Take 1 mg by mouth daily    Historical Provider, MD   divalproex (DEPAKOTE ER) 250 MG extended release tablet Take 250 mg by mouth 3 times daily    Historical Provider, MD   apixaban (ELIQUIS) 5 MG TABS tablet Take 1 tablet by mouth 2 times daily 19   Elma Romero MD   losartan (COZAAR) 25 MG tablet Take 1 tablet by mouth daily 19   Elma Romero MD   Lactobacillus (ACIDOPHILUS) CAPS capsule Take 1 capsule by mouth daily    Historical Provider, MD   oxyCODONE-acetaminophen (PERCOCET) 5-325 MG per tablet Take 1 tablet by mouth every 8 hours as needed for Pain.      Historical Provider, MD   vitamin C (ASCORBIC ACID) 500 MG tablet Take 500 mg by mouth daily    Historical Provider, MD   potassium chloride (MICRO-K) 10 MEQ extended release capsule Take 10 mEq by mouth daily    Historical Provider, MD   ipratropium-albuterol (DUONEB) 0.5-2.5 (3) MG/3ML SOLN nebulizer solution Inhale 1 vial into the lungs every 8 hours as needed for Shortness of Breath (COPD)    Historical Provider, MD   DULoxetine (CYMBALTA) 30 MG extended release capsule Take 1 capsule by mouth daily 19   Kriss Isidro MD   phenytoin (DILANTIN) 100 MG ER capsule Take 200 mg by mouth 2 times daily MORNING AND AT BEDTIME    Historical Provider, MD   traZODone (DESYREL) 50 MG tablet Take 50 mg by mouth nightly    Historical Provider, MD   docusate sodium (COLACE) 100 MG capsule Take 100 mg by mouth every morning    Historical Provider, MD   aspirin 81 MG chewable tablet Take 81 mg by mouth daily    Historical Provider, MD   atorvastatin (LIPITOR) 10 MG tablet Take 10 mg by mouth nightly     Historical Provider, MD   vitamin D 1000 UNITS CAPS Take 1 capsule by mouth daily     Historical Provider, MD   isosorbide mononitrate (IMDUR) 30 MG extended release tablet Take 30 mg by mouth daily    Historical Provider, MD   furosemide (LASIX) 20 MG tablet Take 40 mg by mouth daily  6/9/19   Historical Provider, MD   magnesium hydroxide (MILK OF MAGNESIA) 400 MG/5ML suspension Take 30 mLs by mouth daily as needed for Constipation    Historical Provider, MD   phenytoin (DILANTIN) 100 MG ER capsule Take 100 mg by mouth Daily with lunch     Historical Provider, MD   metoprolol succinate (TOPROL XL) 25 MG extended release tablet Take 25 mg by mouth daily    Historical Provider, MD   zonisamide (ZONEGRAN) 100 MG capsule Take 100 mg by mouth daily    Historical Provider, MD       Current medications:    No current facility-administered medications for this visit. No current outpatient medications on file.      Facility-Administered Medications Ordered in Other Visits   Medication Dose Route Frequency Provider Last Rate Last Dose    lactated ringers infusion             sodium chloride flush 0.9 % injection 10 mL  10 mL Intravenous 2 times per day Miley Johnson MD   10 mL at 08/12/19 2135    sodium chloride flush 0.9 % injection 10 mL  10 mL Intravenous PRN Miley Johnson MD        oxyCODONE-acetaminophen (PERCOCET)  MG per tablet 1 tablet  1 tablet Oral Q4H PRN Carmen Marin MD   1 tablet at 08/12/19 2255    HYDROmorphone (DILAUDID) injection 0.5 mg  0.5 mg Intravenous Q3H PRN Willis Aj MD   0.5 mg at 08/13/19 2062    Or    HYDROmorphone (DILAUDID) injection 1 mg  1 mg Intravenous Q3H PRN Willis Aj MD   1 mg at 08/13/19 1303    meropenem (MERREM) 1 g in WBC 7.1 08/10/2019    RBC 3.47 08/10/2019    RBC 4.00 12/28/2011    HGB 11.5 08/10/2019    HCT 34.4 08/10/2019    MCV 99.1 08/10/2019    RDW 13.9 08/10/2019     08/10/2019       CMP:   Lab Results   Component Value Date     08/10/2019    K 4.0 08/10/2019     08/10/2019    CO2 23 08/10/2019    BUN 4 08/10/2019    CREATININE 0.36 08/10/2019    GFRAA >60.0 08/10/2019    LABGLOM >60.0 08/10/2019    GLUCOSE 96 08/10/2019    GLUCOSE 94 12/28/2011    PROT 7.5 08/09/2019    CALCIUM 9.2 08/10/2019    BILITOT <0.2 08/09/2019    ALKPHOS 134 08/09/2019    AST 10 08/09/2019    ALT 6 08/09/2019       POC Tests: No results for input(s): POCGLU, POCNA, POCK, POCCL, POCBUN, POCHEMO, POCHCT in the last 72 hours. Coags:   Lab Results   Component Value Date    PROTIME 10.6 05/12/2017    PROTIME 16.5 12/30/2013    INR 1.0 05/12/2017    APTT 24.3 05/12/2017       HCG (If Applicable):   Lab Results   Component Value Date    PREGTESTUR Negative 01/14/2019        ABGs: No results found for: PHART, PO2ART, ESV6EVQ, LHE3NHD, BEART, A2UJCBIF     Type & Screen (If Applicable):  No results found for: LABABO, [address]    Anesthesia Evaluation  Patient summary reviewed and Nursing notes reviewed no history of anesthetic complications:   Airway: Mallampati: II  TM distance: >3 FB   Neck ROM: full  Mouth opening: > = 3 FB Dental: normal exam         Pulmonary:   (+) current smoker          Patient did not smoke on day of surgery. Cardiovascular:    (+) hypertension:, CAD:, hyperlipidemia      ECG reviewed               Beta Blocker:  Dose within 24 Hrs         Neuro/Psych:   (+) CVA (aphasia):, neuromuscular disease:, psychiatric history (major depression):             ROS comment: Aphasia GI/Hepatic/Renal: Neg GI/Hepatic/Renal ROS       Morbid obesity: class I obesity.        Endo/Other:    (+) DiabetesType II DM, , blood dyscrasia: anemia:., .                 Abdominal:           Vascular:   + PVD, aortic or

## 2019-08-14 LAB
BLOOD CULTURE, ROUTINE: NORMAL
CULTURE, BLOOD 2: NORMAL

## 2019-08-14 PROCEDURE — 6370000000 HC RX 637 (ALT 250 FOR IP): Performed by: THORACIC SURGERY (CARDIOTHORACIC VASCULAR SURGERY)

## 2019-08-14 PROCEDURE — 6360000002 HC RX W HCPCS: Performed by: INTERNAL MEDICINE

## 2019-08-14 PROCEDURE — 6360000002 HC RX W HCPCS: Performed by: FAMILY MEDICINE

## 2019-08-14 PROCEDURE — 2580000003 HC RX 258: Performed by: EMERGENCY MEDICINE

## 2019-08-14 PROCEDURE — 6360000002 HC RX W HCPCS: Performed by: THORACIC SURGERY (CARDIOTHORACIC VASCULAR SURGERY)

## 2019-08-14 PROCEDURE — 2580000003 HC RX 258: Performed by: THORACIC SURGERY (CARDIOTHORACIC VASCULAR SURGERY)

## 2019-08-14 PROCEDURE — 2580000003 HC RX 258: Performed by: FAMILY MEDICINE

## 2019-08-14 PROCEDURE — 2580000003 HC RX 258: Performed by: INTERNAL MEDICINE

## 2019-08-14 PROCEDURE — 99232 SBSQ HOSP IP/OBS MODERATE 35: CPT | Performed by: INTERNAL MEDICINE

## 2019-08-14 PROCEDURE — 2500000003 HC RX 250 WO HCPCS: Performed by: THORACIC SURGERY (CARDIOTHORACIC VASCULAR SURGERY)

## 2019-08-14 PROCEDURE — 6360000002 HC RX W HCPCS: Performed by: COLON & RECTAL SURGERY

## 2019-08-14 PROCEDURE — 2000000000 HC ICU R&B

## 2019-08-14 PROCEDURE — 6370000000 HC RX 637 (ALT 250 FOR IP): Performed by: FAMILY MEDICINE

## 2019-08-14 RX ORDER — LORAZEPAM 2 MG/ML
1 INJECTION INTRAMUSCULAR
Status: DISCONTINUED | OUTPATIENT
Start: 2019-08-14 | End: 2019-08-16 | Stop reason: HOSPADM

## 2019-08-14 RX ORDER — OXYCODONE AND ACETAMINOPHEN 10; 325 MG/1; MG/1
1 TABLET ORAL EVERY 4 HOURS PRN
Status: DISCONTINUED | OUTPATIENT
Start: 2019-08-14 | End: 2019-08-16 | Stop reason: HOSPADM

## 2019-08-14 RX ADMIN — HYDROMORPHONE HYDROCHLORIDE 0.5 MG: 1 INJECTION, SOLUTION INTRAMUSCULAR; INTRAVENOUS; SUBCUTANEOUS at 15:21

## 2019-08-14 RX ADMIN — Medication 3 ML: at 13:55

## 2019-08-14 RX ADMIN — MEROPENEM 1 G: 1 INJECTION INTRAVENOUS at 15:16

## 2019-08-14 RX ADMIN — MEROPENEM 1 G: 1 INJECTION INTRAVENOUS at 07:31

## 2019-08-14 RX ADMIN — Medication 10 ML: at 08:17

## 2019-08-14 RX ADMIN — MEROPENEM 1 G: 1 INJECTION INTRAVENOUS at 22:52

## 2019-08-14 RX ADMIN — DEXMEDETOMIDINE 0.6 MCG/KG/HR: 100 INJECTION, SOLUTION, CONCENTRATE INTRAVENOUS at 02:46

## 2019-08-14 RX ADMIN — ENOXAPARIN SODIUM 40 MG: 40 INJECTION SUBCUTANEOUS at 08:17

## 2019-08-14 RX ADMIN — Medication 10 ML: at 08:18

## 2019-08-14 RX ADMIN — Medication 3 ML: at 17:44

## 2019-08-14 RX ADMIN — HYDROMORPHONE HYDROCHLORIDE 0.5 MG: 1 INJECTION, SOLUTION INTRAMUSCULAR; INTRAVENOUS; SUBCUTANEOUS at 00:22

## 2019-08-14 RX ADMIN — OXYCODONE HYDROCHLORIDE AND ACETAMINOPHEN 1 TABLET: 10; 325 TABLET ORAL at 22:52

## 2019-08-14 RX ADMIN — HYDROMORPHONE HYDROCHLORIDE 0.5 MG: 1 INJECTION, SOLUTION INTRAMUSCULAR; INTRAVENOUS; SUBCUTANEOUS at 11:09

## 2019-08-14 RX ADMIN — OXYCODONE HYDROCHLORIDE AND ACETAMINOPHEN 1 TABLET: 10; 325 TABLET ORAL at 19:18

## 2019-08-14 RX ADMIN — HYDROMORPHONE HYDROCHLORIDE 0.5 MG: 1 INJECTION, SOLUTION INTRAMUSCULAR; INTRAVENOUS; SUBCUTANEOUS at 01:34

## 2019-08-14 RX ADMIN — DEXMEDETOMIDINE 0.6 MCG/KG/HR: 100 INJECTION, SOLUTION, CONCENTRATE INTRAVENOUS at 13:54

## 2019-08-14 RX ADMIN — Medication 10 ML: at 13:54

## 2019-08-14 RX ADMIN — LORAZEPAM 1 MG: 2 INJECTION INTRAMUSCULAR; INTRAVENOUS at 20:20

## 2019-08-14 RX ADMIN — HYDROMORPHONE HYDROCHLORIDE 0.5 MG: 1 INJECTION, SOLUTION INTRAMUSCULAR; INTRAVENOUS; SUBCUTANEOUS at 07:33

## 2019-08-14 RX ADMIN — LORAZEPAM 1 MG: 2 INJECTION INTRAMUSCULAR; INTRAVENOUS at 23:36

## 2019-08-14 ASSESSMENT — PAIN SCALES - GENERAL
PAINLEVEL_OUTOF10: 2
PAINLEVEL_OUTOF10: 0
PAINLEVEL_OUTOF10: 5
PAINLEVEL_OUTOF10: 10
PAINLEVEL_OUTOF10: 4
PAINLEVEL_OUTOF10: 0
PAINLEVEL_OUTOF10: 4
PAINLEVEL_OUTOF10: 5
PAINLEVEL_OUTOF10: 4
PAINLEVEL_OUTOF10: 0
PAINLEVEL_OUTOF10: 7
PAINLEVEL_OUTOF10: 0
PAINLEVEL_OUTOF10: 0
PAINLEVEL_OUTOF10: 7
PAINLEVEL_OUTOF10: 10
PAINLEVEL_OUTOF10: 5
PAINLEVEL_OUTOF10: 10
PAINLEVEL_OUTOF10: 4
PAINLEVEL_OUTOF10: 6
PAINLEVEL_OUTOF10: 3
PAINLEVEL_OUTOF10: 0

## 2019-08-14 ASSESSMENT — PAIN DESCRIPTION - LOCATION
LOCATION: LEG

## 2019-08-14 ASSESSMENT — PAIN DESCRIPTION - ORIENTATION
ORIENTATION: RIGHT

## 2019-08-14 ASSESSMENT — ENCOUNTER SYMPTOMS
GASTROINTESTINAL NEGATIVE: 1
RESPIRATORY NEGATIVE: 1

## 2019-08-14 ASSESSMENT — PAIN DESCRIPTION - PAIN TYPE
TYPE: SURGICAL PAIN

## 2019-08-14 NOTE — PROGRESS NOTES
[facility] Occupational Therapy Department  Change in Status Communication Form      To the referring physician:    Due to an acute change in this patient's medical status, new Occupational Therapy Eval and Treatment orders are required. Pt. transferred to ICU on 8-. We thank you for your referral.     [address] [place] [address] OT Department.      Electronically signed by GILBERT Fitch on 8/14/19 at 8:22 AM

## 2019-08-14 NOTE — OP NOTE
Cathryn Murillo [address] [place] [address], [address] [facility]                                OPERATIVE REPORT    PATIENT NAME: Vicente Asencio                   :        1960  MED REC NO:   [id number]                            ROOM:       [facility]  ACCOUNT NO:   [de-identified]                           ADMIT DATE: 2019  PROVIDER:     Carole Meléndez MD    DATE OF PROCEDURE:  2019    PREOPERATIVE DIAGNOSIS:  Traumatic dehiscence of right AKA stump. POSTOPERATIVE DIAGNOSIS:  Traumatic dehiscence of right AKA stump. OPERATION PERFORMED:  Revision and debridement of the right AKA stump. SURGEON:  Carole Meléndez MD.    ANESTHESIA:  General.    FINDINGS:  A 59-year-old woman who had undergone above-the-knee  amputation performed in July by me and was at a nursing home. Over the  last weekend, she apparently had fallen on her stump and was taken to  the emergency room where she was found to have a dehiscence of the  majority of her AKA stump. It was treated with sterile dressings and  antibiotics and she was taken to the operating room today for the  procedure resulting in a revision and the debridement with closure of  the anterior to posterior flaps without tension. The procedure is as  follows:    OPERATIVE PROCEDURE:  With the patient properly identified and prepared,  we used a 10 blade scalpel to cut away the skin of the anterior flap and  posterior flap. The anterior flap being 16.5 cm in length to a width of  4 cm and the posterior flap 13.5 cm with a width of 3 cm. We then used  electrocautery for hemostasis of the edges of the wounds, and then the  stump tissue was removed below these flaps which was a 9 x 5 cm area. We dissected and sorted away, a very hollow with no marrow found in the  patient's femur. We divided the femur with a bone cutting saw of 7.5 cm  of a section of femur.   We took one piece of soft tissue for culture

## 2019-08-14 NOTE — PROGRESS NOTES
VascularProgress Note    POD #  1    Patient is Augustin Floss. Subjective: No major pain issues at present    Objective: Excellent op site appearance. Small amount of serous drainage expressed. Incisions: Clean dry and intact. No hematoma or bleeding noted. Assessment: Much improved status S/P Revision of traumatic AKA stump dehiscence. Plan: Wound care and OOB in CVICU x 24 hours before transfer to floor.     Electronically signed by Bettina Marie MD on 8/14/2019 at 11:02 AM

## 2019-08-14 NOTE — PROGRESS NOTES
Physical Therapy   Facility/Department: [facility] MED SURG IC07/IC07-01    Patient D/C'd from current physical therapy POC due to an acute change in this patient's medical status (transfered to ICU). New physical therapy Eval and Treat orders are required to resume PT when pt is stable and appropriate for out of bed activity. Sticky note written to physicians.     [address] [place] [address]) Physical Therapy Department    Electronically signed by Jeni Madsen PT on 8/14/19 at 8:24 AM

## 2019-08-14 NOTE — PROGRESS NOTES
input(s): WBC, HGB, HCT, PLT in the last 72 hours. No results for input(s): NA, K, CL, CO2, BUN, CREATININE, CALCIUM, PHOS in the last 72 hours. Invalid input(s): MAGNES  No results for input(s): AST, ALT, BILIDIR, BILITOT, ALKPHOS in the last 72 hours. No results for input(s): INR in the last 72 hours. No results for input(s): Brad Doug in the last 72 hours. Urinalysis:   Lab Results   Component Value Date    NITRU Negative 08/09/2019    WBCUA 0-2 12/20/2016    BACTERIA Many 12/20/2016    RBCUA 0-2 12/20/2016    BLOODU Negative 08/09/2019    SPECGRAV 1.006 08/09/2019    GLUCOSEU Negative 08/09/2019       Radiology:   Most recent    Chest CT      WITH CONTRAST:No results found for this or any previous visit. WITHOUT CONTRAST: No results found for this or any previous visit. CXR      2-view: No results found for this or any previous visit. Portable:   Results for orders placed during the hospital encounter of 07/19/19   XR CHEST PORTABLE    Narrative Portable chest radiograph    History: Line placement    Technique: AP portable view of the chest obtained. Comparison: Chest radiograph from January 14, 2019    Findings:    Right internal jugular central catheter is present and terminates within the SVC. The cardiomediastinal silhouette is within normal limits. No pneumothorax, pleural effusion, or focal consolidation. Multiple chronic right rib fractures noted. No acute   osseous abnormality. Impression No acute intrathoracic process. Echo No results found for this or any previous visit. Assessment/Plan:    Active Hospital Problems    Diagnosis Date Noted    Wound dehiscence [T81.30XA] 08/09/2019     1 - Wound dehiscence with local cellulitis              C/s gen surgery as Dr. Cheryl Pickard is not available at this time to see the patient. C/S ID to eval for abx regimen. Start vancomycin and rocephin. Wound culture. Pain control.   Does not meet sirs criteria.    8/10 - Appreciate Dr. Marline Mancini seeing patient, he removed multiple sutures from dehisced wound, await Dr. Dakota chavez, cont abx   8/11 - vanco infiltrated iv, changed to meropenem and zyvox, await Dr. Dakota chavez   8/12 - await surgical plans from Dr. Dakota Alexandra   8/13 - to OR today for wound debridement/poss closure   8.14 - post op care, monitor in icu o/n.   DC planning soon    2 - Tobacco dependence              Nicotine TD     3 - DVT proph              lovenox sq    Electronically signed by Marino Deleon MD on 8/14/2019 at 2:36 PM

## 2019-08-15 LAB
ANION GAP SERPL CALCULATED.3IONS-SCNC: 9 MEQ/L (ref 9–15)
BASOPHILS ABSOLUTE: 0 K/UL (ref 0–0.2)
BASOPHILS RELATIVE PERCENT: 0.2 %
BUN BLDV-MCNC: 5 MG/DL (ref 8–23)
CALCIUM SERPL-MCNC: 8.2 MG/DL (ref 8.5–9.9)
CHLORIDE BLD-SCNC: 109 MEQ/L (ref 95–107)
CO2: 26 MEQ/L (ref 20–31)
CREAT SERPL-MCNC: 0.26 MG/DL (ref 0.5–0.9)
EOSINOPHILS ABSOLUTE: 0.3 K/UL (ref 0–0.7)
EOSINOPHILS RELATIVE PERCENT: 4.7 %
GFR AFRICAN AMERICAN: >60
GFR NON-AFRICAN AMERICAN: >60
GLUCOSE BLD-MCNC: 101 MG/DL (ref 70–99)
HCT VFR BLD CALC: 26.8 % (ref 37–47)
HEMOGLOBIN: 9.2 G/DL (ref 12–16)
LYMPHOCYTES ABSOLUTE: 1.4 K/UL (ref 1–4.8)
LYMPHOCYTES RELATIVE PERCENT: 23.6 %
MCH RBC QN AUTO: 32.8 PG (ref 27–31.3)
MCHC RBC AUTO-ENTMCNC: 34.5 % (ref 33–37)
MCV RBC AUTO: 94.9 FL (ref 82–100)
MONOCYTES ABSOLUTE: 0.6 K/UL (ref 0.2–0.8)
MONOCYTES RELATIVE PERCENT: 10 %
NEUTROPHILS ABSOLUTE: 3.7 K/UL (ref 1.4–6.5)
NEUTROPHILS RELATIVE PERCENT: 61.5 %
PDW BLD-RTO: 13.3 % (ref 11.5–14.5)
PLATELET # BLD: 187 K/UL (ref 130–400)
POTASSIUM SERPL-SCNC: 3.8 MEQ/L (ref 3.4–4.9)
RBC # BLD: 2.82 M/UL (ref 4.2–5.4)
SODIUM BLD-SCNC: 144 MEQ/L (ref 135–144)
WBC # BLD: 6 K/UL (ref 4.8–10.8)

## 2019-08-15 PROCEDURE — 97163 PT EVAL HIGH COMPLEX 45 MIN: CPT

## 2019-08-15 PROCEDURE — 85025 COMPLETE CBC W/AUTO DIFF WBC: CPT

## 2019-08-15 PROCEDURE — 97166 OT EVAL MOD COMPLEX 45 MIN: CPT

## 2019-08-15 PROCEDURE — 80048 BASIC METABOLIC PNL TOTAL CA: CPT

## 2019-08-15 PROCEDURE — 2580000003 HC RX 258: Performed by: THORACIC SURGERY (CARDIOTHORACIC VASCULAR SURGERY)

## 2019-08-15 PROCEDURE — 99232 SBSQ HOSP IP/OBS MODERATE 35: CPT | Performed by: INTERNAL MEDICINE

## 2019-08-15 PROCEDURE — 2580000003 HC RX 258: Performed by: INTERNAL MEDICINE

## 2019-08-15 PROCEDURE — 6360000002 HC RX W HCPCS: Performed by: FAMILY MEDICINE

## 2019-08-15 PROCEDURE — 6370000000 HC RX 637 (ALT 250 FOR IP): Performed by: THORACIC SURGERY (CARDIOTHORACIC VASCULAR SURGERY)

## 2019-08-15 PROCEDURE — 2580000003 HC RX 258: Performed by: FAMILY MEDICINE

## 2019-08-15 PROCEDURE — 6370000000 HC RX 637 (ALT 250 FOR IP): Performed by: FAMILY MEDICINE

## 2019-08-15 PROCEDURE — 6360000002 HC RX W HCPCS: Performed by: INTERNAL MEDICINE

## 2019-08-15 PROCEDURE — 2000000000 HC ICU R&B

## 2019-08-15 PROCEDURE — 2700000000 HC OXYGEN THERAPY PER DAY

## 2019-08-15 RX ORDER — METOPROLOL SUCCINATE 25 MG/1
25 TABLET, EXTENDED RELEASE ORAL DAILY
Status: DISCONTINUED | OUTPATIENT
Start: 2019-08-15 | End: 2019-08-16 | Stop reason: HOSPADM

## 2019-08-15 RX ORDER — ATORVASTATIN CALCIUM 10 MG/1
10 TABLET, FILM COATED ORAL NIGHTLY
Status: DISCONTINUED | OUTPATIENT
Start: 2019-08-15 | End: 2019-08-16 | Stop reason: HOSPADM

## 2019-08-15 RX ORDER — PHENYTOIN SODIUM 100 MG/1
200 CAPSULE, EXTENDED RELEASE ORAL 2 TIMES DAILY
Status: DISCONTINUED | OUTPATIENT
Start: 2019-08-15 | End: 2019-08-16 | Stop reason: HOSPADM

## 2019-08-15 RX ORDER — PHENYTOIN SODIUM 100 MG/1
100 CAPSULE, EXTENDED RELEASE ORAL
Status: DISCONTINUED | OUTPATIENT
Start: 2019-08-15 | End: 2019-08-16 | Stop reason: HOSPADM

## 2019-08-15 RX ORDER — ASPIRIN 81 MG/1
81 TABLET, CHEWABLE ORAL DAILY
Status: DISCONTINUED | OUTPATIENT
Start: 2019-08-15 | End: 2019-08-16 | Stop reason: HOSPADM

## 2019-08-15 RX ORDER — FOLIC ACID 1 MG/1
1 TABLET ORAL DAILY
Status: DISCONTINUED | OUTPATIENT
Start: 2019-08-15 | End: 2019-08-16 | Stop reason: HOSPADM

## 2019-08-15 RX ORDER — DULOXETIN HYDROCHLORIDE 30 MG/1
30 CAPSULE, DELAYED RELEASE ORAL DAILY
Status: DISCONTINUED | OUTPATIENT
Start: 2019-08-15 | End: 2019-08-16 | Stop reason: HOSPADM

## 2019-08-15 RX ORDER — POTASSIUM CHLORIDE 750 MG/1
10 CAPSULE, EXTENDED RELEASE ORAL DAILY
Status: DISCONTINUED | OUTPATIENT
Start: 2019-08-15 | End: 2019-08-16 | Stop reason: HOSPADM

## 2019-08-15 RX ORDER — ZONISAMIDE 100 MG/1
100 CAPSULE ORAL DAILY
Status: DISCONTINUED | OUTPATIENT
Start: 2019-08-15 | End: 2019-08-16 | Stop reason: HOSPADM

## 2019-08-15 RX ORDER — DOCUSATE SODIUM 100 MG/1
100 CAPSULE, LIQUID FILLED ORAL EVERY MORNING
Status: DISCONTINUED | OUTPATIENT
Start: 2019-08-15 | End: 2019-08-16 | Stop reason: HOSPADM

## 2019-08-15 RX ORDER — ASCORBIC ACID 500 MG
500 TABLET ORAL DAILY
Status: DISCONTINUED | OUTPATIENT
Start: 2019-08-15 | End: 2019-08-16 | Stop reason: HOSPADM

## 2019-08-15 RX ORDER — FUROSEMIDE 40 MG/1
40 TABLET ORAL DAILY
Status: DISCONTINUED | OUTPATIENT
Start: 2019-08-15 | End: 2019-08-16 | Stop reason: HOSPADM

## 2019-08-15 RX ORDER — DIVALPROEX SODIUM 250 MG/1
250 TABLET, EXTENDED RELEASE ORAL 3 TIMES DAILY
Status: DISCONTINUED | OUTPATIENT
Start: 2019-08-15 | End: 2019-08-16 | Stop reason: HOSPADM

## 2019-08-15 RX ORDER — L. ACIDOPHILUS/L.BULGARICUS 1MM CELL
1 TABLET ORAL DAILY
Status: DISCONTINUED | OUTPATIENT
Start: 2019-08-15 | End: 2019-08-16 | Stop reason: HOSPADM

## 2019-08-15 RX ORDER — FERROUS SULFATE 325(65) MG
325 TABLET ORAL EVERY 12 HOURS
Status: DISCONTINUED | OUTPATIENT
Start: 2019-08-15 | End: 2019-08-16 | Stop reason: HOSPADM

## 2019-08-15 RX ORDER — LOSARTAN POTASSIUM 25 MG/1
25 TABLET ORAL DAILY
Status: DISCONTINUED | OUTPATIENT
Start: 2019-08-15 | End: 2019-08-16 | Stop reason: HOSPADM

## 2019-08-15 RX ORDER — ISOSORBIDE MONONITRATE 30 MG/1
30 TABLET, EXTENDED RELEASE ORAL DAILY
Status: DISCONTINUED | OUTPATIENT
Start: 2019-08-15 | End: 2019-08-16 | Stop reason: HOSPADM

## 2019-08-15 RX ORDER — IPRATROPIUM BROMIDE AND ALBUTEROL SULFATE 2.5; .5 MG/3ML; MG/3ML
1 SOLUTION RESPIRATORY (INHALATION) EVERY 8 HOURS PRN
Status: DISCONTINUED | OUTPATIENT
Start: 2019-08-15 | End: 2019-08-16 | Stop reason: HOSPADM

## 2019-08-15 RX ORDER — TRAZODONE HYDROCHLORIDE 50 MG/1
50 TABLET ORAL NIGHTLY
Status: DISCONTINUED | OUTPATIENT
Start: 2019-08-15 | End: 2019-08-16 | Stop reason: HOSPADM

## 2019-08-15 RX ADMIN — Medication 10 ML: at 08:42

## 2019-08-15 RX ADMIN — OXYCODONE HYDROCHLORIDE AND ACETAMINOPHEN 1 TABLET: 10; 325 TABLET ORAL at 20:31

## 2019-08-15 RX ADMIN — PHENYTOIN SODIUM 100 MG: 100 CAPSULE ORAL at 13:03

## 2019-08-15 RX ADMIN — DIVALPROEX SODIUM 250 MG: 250 TABLET, EXTENDED RELEASE ORAL at 20:31

## 2019-08-15 RX ADMIN — ASPIRIN 81 MG 81 MG: 81 TABLET ORAL at 10:46

## 2019-08-15 RX ADMIN — PHENYTOIN SODIUM 200 MG: 100 CAPSULE ORAL at 20:33

## 2019-08-15 RX ADMIN — OXYCODONE HYDROCHLORIDE AND ACETAMINOPHEN 1 TABLET: 10; 325 TABLET ORAL at 04:25

## 2019-08-15 RX ADMIN — MEROPENEM 1 G: 1 INJECTION INTRAVENOUS at 06:16

## 2019-08-15 RX ADMIN — Medication 10 ML: at 21:00

## 2019-08-15 RX ADMIN — FERROUS SULFATE TAB 325 MG (65 MG ELEMENTAL FE) 325 MG: 325 (65 FE) TAB at 21:42

## 2019-08-15 RX ADMIN — PHENYTOIN SODIUM 200 MG: 100 CAPSULE ORAL at 10:47

## 2019-08-15 RX ADMIN — FUROSEMIDE 40 MG: 40 TABLET ORAL at 10:43

## 2019-08-15 RX ADMIN — VITAMIN D, TAB 1000IU (100/BT) 1000 UNITS: 25 TAB at 10:55

## 2019-08-15 RX ADMIN — METOPROLOL SUCCINATE 25 MG: 25 TABLET, EXTENDED RELEASE ORAL at 13:08

## 2019-08-15 RX ADMIN — ZONISAMIDE 100 MG: 100 CAPSULE ORAL at 17:56

## 2019-08-15 RX ADMIN — DIVALPROEX SODIUM 250 MG: 250 TABLET, EXTENDED RELEASE ORAL at 10:47

## 2019-08-15 RX ADMIN — OXYCODONE HYDROCHLORIDE AND ACETAMINOPHEN 500 MG: 500 TABLET ORAL at 10:55

## 2019-08-15 RX ADMIN — OXYCODONE HYDROCHLORIDE AND ACETAMINOPHEN 1 TABLET: 10; 325 TABLET ORAL at 08:38

## 2019-08-15 RX ADMIN — ISOSORBIDE MONONITRATE 30 MG: 30 TABLET, EXTENDED RELEASE ORAL at 10:43

## 2019-08-15 RX ADMIN — DULOXETINE HYDROCHLORIDE 30 MG: 30 CAPSULE, DELAYED RELEASE ORAL at 10:43

## 2019-08-15 RX ADMIN — MEROPENEM 1 G: 1 INJECTION INTRAVENOUS at 15:55

## 2019-08-15 RX ADMIN — DIVALPROEX SODIUM 250 MG: 250 TABLET, EXTENDED RELEASE ORAL at 17:56

## 2019-08-15 RX ADMIN — ATORVASTATIN CALCIUM 10 MG: 10 TABLET, FILM COATED ORAL at 20:31

## 2019-08-15 RX ADMIN — VANCOMYCIN HYDROCHLORIDE 1000 MG: 1 INJECTION, POWDER, LYOPHILIZED, FOR SOLUTION INTRAVENOUS at 17:56

## 2019-08-15 RX ADMIN — MEROPENEM 1 G: 1 INJECTION INTRAVENOUS at 23:43

## 2019-08-15 RX ADMIN — LACTOBACILLUS TAB 1 TABLET: TAB at 10:42

## 2019-08-15 RX ADMIN — FERROUS SULFATE TAB 325 MG (65 MG ELEMENTAL FE) 325 MG: 325 (65 FE) TAB at 10:43

## 2019-08-15 RX ADMIN — FOLIC ACID 1 MG: 1 TABLET ORAL at 10:43

## 2019-08-15 RX ADMIN — POTASSIUM CHLORIDE 10 MEQ: 750 CAPSULE, EXTENDED RELEASE ORAL at 17:56

## 2019-08-15 RX ADMIN — ENOXAPARIN SODIUM 40 MG: 40 INJECTION SUBCUTANEOUS at 08:39

## 2019-08-15 RX ADMIN — DOCUSATE SODIUM 100 MG: 100 CAPSULE, LIQUID FILLED ORAL at 10:43

## 2019-08-15 RX ADMIN — OXYCODONE HYDROCHLORIDE AND ACETAMINOPHEN 1 TABLET: 10; 325 TABLET ORAL at 13:03

## 2019-08-15 RX ADMIN — TRAZODONE HYDROCHLORIDE 50 MG: 50 TABLET ORAL at 20:31

## 2019-08-15 ASSESSMENT — PAIN DESCRIPTION - LOCATION
LOCATION: LEG

## 2019-08-15 ASSESSMENT — PAIN DESCRIPTION - ORIENTATION
ORIENTATION: RIGHT

## 2019-08-15 ASSESSMENT — PAIN SCALES - GENERAL
PAINLEVEL_OUTOF10: 6
PAINLEVEL_OUTOF10: 2
PAINLEVEL_OUTOF10: 0
PAINLEVEL_OUTOF10: 9
PAINLEVEL_OUTOF10: 10
PAINLEVEL_OUTOF10: 0
PAINLEVEL_OUTOF10: 9
PAINLEVEL_OUTOF10: 9
PAINLEVEL_OUTOF10: 0
PAINLEVEL_OUTOF10: 0
PAINLEVEL_OUTOF10: 5
PAINLEVEL_OUTOF10: 10
PAINLEVEL_OUTOF10: 0

## 2019-08-15 ASSESSMENT — ENCOUNTER SYMPTOMS
RESPIRATORY NEGATIVE: 1
GASTROINTESTINAL NEGATIVE: 1

## 2019-08-15 ASSESSMENT — PAIN DESCRIPTION - DESCRIPTORS
DESCRIPTORS: THROBBING
DESCRIPTORS: ACHING

## 2019-08-15 ASSESSMENT — PAIN DESCRIPTION - PAIN TYPE
TYPE: ACUTE PAIN;SURGICAL PAIN
TYPE: ACUTE PAIN
TYPE: ACUTE PAIN
TYPE: SURGICAL PAIN
TYPE: SURGICAL PAIN;ACUTE PAIN

## 2019-08-15 ASSESSMENT — PAIN DESCRIPTION - ONSET: ONSET: ON-GOING

## 2019-08-15 ASSESSMENT — PAIN DESCRIPTION - FREQUENCY: FREQUENCY: CONTINUOUS

## 2019-08-15 NOTE — PROGRESS NOTES
Assessment  Patient Currently in Pain: Yes  Pain Assessment: 0-10  Pain Level: 10(\"12/10\")  Pain Type: Acute pain, Surgical pain  Pain Location: Leg  Pain Orientation: Right  Pain Descriptors: Throbbing       Prior Level of Function:  Social/Functional History  Type of Home: Facility([facility])  Home Layout: One level  Home Access: Level entry  Bathroom Shower/Tub: Walk-in shower(\"They put me in a chair and wheel me in\")  Bathroom Toilet: Handicap height  Bathroom Equipment: Grab bars in shower  Home Equipment: [employer] 450 bed  ADL Assistance: Needs assistance(Needs assist to setup for shower, states she bathes herself, seated in a rolling shower chair)  Homemaking Assistance: Needs assistance  Ambulation Assistance: Independent(does not ambulate, Uses w/c independently per pt report)  Transfer Assistance: Independent  Active : No  Additional Comments: Pt. inconsistent historian at times    OBJECTIVE:     Orientation Status:  Orientation  Overall Orientation Status: Impaired  Orientation Level: Oriented to person(States \"15th of April\"- even with cues unable to state \"August\")    Observation:  Observation/Palpation  Posture: Fair  Observation: On O2 1L R AKA bandaged    Cognition Status:  Cognition  Overall Cognitive Status: Exceptions  Arousal/Alertness: Appropriate responses to stimuli  Following Commands:  Follows multistep commands with repitition  Attention Span: Appears intact  Memory: Decreased recall of precautions, Decreased recall of recent events, Decreased short term memory  Safety Judgement: Decreased awareness of need for assistance, Decreased awareness of need for safety  Problem Solving: Assistance required to generate solutions, Assistance required to implement solutions, Assistance required to correct errors made  Insights: Decreased awareness of deficits  Initiation: Requires cues for some  Sequencing: Requires cues for some  Cognition Comment: Pt. with significant

## 2019-08-16 VITALS
HEART RATE: 70 BPM | WEIGHT: 179.23 LBS | SYSTOLIC BLOOD PRESSURE: 102 MMHG | OXYGEN SATURATION: 100 % | DIASTOLIC BLOOD PRESSURE: 41 MMHG | HEIGHT: 66 IN | BODY MASS INDEX: 28.81 KG/M2 | TEMPERATURE: 98.4 F | RESPIRATION RATE: 16 BRPM

## 2019-08-16 LAB
ANAEROBIC CULTURE: ABNORMAL
CULTURE SURGICAL: ABNORMAL
GRAM STAIN RESULT: ABNORMAL
ORGANISM: ABNORMAL
ORGANISM: ABNORMAL

## 2019-08-16 PROCEDURE — 2580000003 HC RX 258: Performed by: INTERNAL MEDICINE

## 2019-08-16 PROCEDURE — 6360000002 HC RX W HCPCS: Performed by: FAMILY MEDICINE

## 2019-08-16 PROCEDURE — 97535 SELF CARE MNGMENT TRAINING: CPT

## 2019-08-16 PROCEDURE — 6360000002 HC RX W HCPCS: Performed by: INTERNAL MEDICINE

## 2019-08-16 PROCEDURE — 6370000000 HC RX 637 (ALT 250 FOR IP): Performed by: THORACIC SURGERY (CARDIOTHORACIC VASCULAR SURGERY)

## 2019-08-16 PROCEDURE — 97110 THERAPEUTIC EXERCISES: CPT

## 2019-08-16 PROCEDURE — 2580000003 HC RX 258: Performed by: THORACIC SURGERY (CARDIOTHORACIC VASCULAR SURGERY)

## 2019-08-16 PROCEDURE — 6370000000 HC RX 637 (ALT 250 FOR IP): Performed by: FAMILY MEDICINE

## 2019-08-16 PROCEDURE — 99232 SBSQ HOSP IP/OBS MODERATE 35: CPT | Performed by: INTERNAL MEDICINE

## 2019-08-16 RX ORDER — OXYCODONE HYDROCHLORIDE AND ACETAMINOPHEN 5; 325 MG/1; MG/1
1 TABLET ORAL EVERY 8 HOURS PRN
Qty: 10 TABLET | Refills: 0 | Status: SHIPPED | OUTPATIENT
Start: 2019-08-16 | End: 2019-08-20

## 2019-08-16 RX ADMIN — FUROSEMIDE 40 MG: 40 TABLET ORAL at 09:57

## 2019-08-16 RX ADMIN — PHENYTOIN SODIUM 200 MG: 100 CAPSULE ORAL at 09:56

## 2019-08-16 RX ADMIN — VANCOMYCIN HYDROCHLORIDE 1000 MG: 1 INJECTION, POWDER, LYOPHILIZED, FOR SOLUTION INTRAVENOUS at 04:14

## 2019-08-16 RX ADMIN — PHENYTOIN SODIUM 100 MG: 100 CAPSULE ORAL at 12:16

## 2019-08-16 RX ADMIN — POTASSIUM CHLORIDE 10 MEQ: 750 CAPSULE, EXTENDED RELEASE ORAL at 09:57

## 2019-08-16 RX ADMIN — FERROUS SULFATE TAB 325 MG (65 MG ELEMENTAL FE) 325 MG: 325 (65 FE) TAB at 09:56

## 2019-08-16 RX ADMIN — VANCOMYCIN HYDROCHLORIDE 1000 MG: 1 INJECTION, POWDER, LYOPHILIZED, FOR SOLUTION INTRAVENOUS at 14:42

## 2019-08-16 RX ADMIN — ZONISAMIDE 100 MG: 100 CAPSULE ORAL at 09:56

## 2019-08-16 RX ADMIN — MEROPENEM 1 G: 1 INJECTION INTRAVENOUS at 10:00

## 2019-08-16 RX ADMIN — DIVALPROEX SODIUM 250 MG: 250 TABLET, EXTENDED RELEASE ORAL at 09:57

## 2019-08-16 RX ADMIN — DIVALPROEX SODIUM 250 MG: 250 TABLET, EXTENDED RELEASE ORAL at 14:42

## 2019-08-16 RX ADMIN — FOLIC ACID 1 MG: 1 TABLET ORAL at 09:56

## 2019-08-16 RX ADMIN — OXYCODONE HYDROCHLORIDE AND ACETAMINOPHEN 1 TABLET: 10; 325 TABLET ORAL at 04:34

## 2019-08-16 RX ADMIN — LACTOBACILLUS TAB 1 TABLET: TAB at 09:57

## 2019-08-16 RX ADMIN — ENOXAPARIN SODIUM 40 MG: 40 INJECTION SUBCUTANEOUS at 09:59

## 2019-08-16 RX ADMIN — LOSARTAN POTASSIUM 25 MG: 25 TABLET ORAL at 09:57

## 2019-08-16 RX ADMIN — OXYCODONE HYDROCHLORIDE AND ACETAMINOPHEN 500 MG: 500 TABLET ORAL at 09:57

## 2019-08-16 RX ADMIN — Medication 10 ML: at 09:59

## 2019-08-16 RX ADMIN — MAGNESIUM HYDROXIDE 30 ML: 400 SUSPENSION ORAL at 12:19

## 2019-08-16 RX ADMIN — VITAMIN D, TAB 1000IU (100/BT) 1000 UNITS: 25 TAB at 09:57

## 2019-08-16 RX ADMIN — ASPIRIN 81 MG 81 MG: 81 TABLET ORAL at 09:58

## 2019-08-16 RX ADMIN — METOPROLOL SUCCINATE 25 MG: 25 TABLET, EXTENDED RELEASE ORAL at 09:57

## 2019-08-16 RX ADMIN — DOCUSATE SODIUM 100 MG: 100 CAPSULE, LIQUID FILLED ORAL at 09:57

## 2019-08-16 RX ADMIN — DULOXETINE HYDROCHLORIDE 30 MG: 30 CAPSULE, DELAYED RELEASE ORAL at 09:56

## 2019-08-16 RX ADMIN — ISOSORBIDE MONONITRATE 30 MG: 30 TABLET, EXTENDED RELEASE ORAL at 09:57

## 2019-08-16 RX ADMIN — OXYCODONE HYDROCHLORIDE AND ACETAMINOPHEN 1 TABLET: 10; 325 TABLET ORAL at 00:33

## 2019-08-16 RX ADMIN — OXYCODONE HYDROCHLORIDE AND ACETAMINOPHEN 1 TABLET: 10; 325 TABLET ORAL at 09:56

## 2019-08-16 RX ADMIN — OXYCODONE HYDROCHLORIDE AND ACETAMINOPHEN 1 TABLET: 10; 325 TABLET ORAL at 15:17

## 2019-08-16 ASSESSMENT — PAIN SCALES - GENERAL
PAINLEVEL_OUTOF10: 6
PAINLEVEL_OUTOF10: 7
PAINLEVEL_OUTOF10: 7
PAINLEVEL_OUTOF10: 6
PAINLEVEL_OUTOF10: 6

## 2019-08-16 ASSESSMENT — PAIN DESCRIPTION - PAIN TYPE
TYPE: ACUTE PAIN
TYPE: ACUTE PAIN

## 2019-08-16 ASSESSMENT — PAIN DESCRIPTION - DESCRIPTORS: DESCRIPTORS: ACHING

## 2019-08-16 ASSESSMENT — PAIN DESCRIPTION - FREQUENCY: FREQUENCY: CONTINUOUS

## 2019-08-16 ASSESSMENT — PAIN DESCRIPTION - ORIENTATION
ORIENTATION: RIGHT
ORIENTATION: RIGHT

## 2019-08-16 ASSESSMENT — PAIN DESCRIPTION - LOCATION
LOCATION: LEG
LOCATION: LEG

## 2019-08-16 ASSESSMENT — PAIN DESCRIPTION - ONSET: ONSET: AWAKENED FROM SLEEP

## 2019-08-16 ASSESSMENT — ENCOUNTER SYMPTOMS
RESPIRATORY NEGATIVE: 1
GASTROINTESTINAL NEGATIVE: 1

## 2019-08-16 NOTE — PROGRESS NOTES
Hospitalist Daily Progress Note  Name: Rika Felton  Age: 60 y.o. Gender: female  CodeStatus: Full Code  Allergies: No Known Allergies    Chief Complaint:Other (Pt sent from Dr Jace Schroeder office with possible wound infection after BKA)      Primary Care Provider: Elisabeth Rodríguez MD    InpatientTreatment Team: Treatment Team: Attending Provider: Juma Miller MD; Consulting Physician: Maria Guadalupe Sheldon MD; Consulting Physician: Tatum Espitia MD; Surgeon: Tatum Espitia MD; Registered Nurse: Disha Domínguez RN; Patient Care Tech: Luisa Connor; : SHRUTHI Maravilla; : Smita Hunter RN; Patient Care Tech: Pan Heart    Admission Date: 8/9/2019      Subjective: No chest pain, sob, nausea. Pain well controlled post op. Physical Exam   Constitutional: She is oriented to person, place, and time. She appears well-developed and well-nourished. Cardiovascular: Normal rate and regular rhythm. Pulmonary/Chest: Effort normal and breath sounds normal.   Abdominal: Soft. Bowel sounds are normal.   Musculoskeletal: Normal range of motion. Neurological: She is alert and oriented to person, place, and time. Skin:   Well wrapped post op closure, no drainage noted   Nursing note and vitals reviewed.       Medications:  Reviewed    Infusion Medications:     Scheduled Medications:    aspirin  81 mg Oral Daily    atorvastatin  10 mg Oral Nightly    divalproex  250 mg Oral TID    docusate sodium  100 mg Oral QAM    DULoxetine  30 mg Oral Daily    ferrous sulfate  325 mg Oral G16A    folic acid  1 mg Oral Daily    furosemide  40 mg Oral Daily    isosorbide mononitrate  30 mg Oral Daily    lactobacillus acidophilus  1 tablet Oral Daily    losartan  25 mg Oral Daily    metoprolol succinate  25 mg Oral Daily    phenytoin  100 mg Oral Lunch    phenytoin  200 mg Oral BID    potassium chloride  10 mEq Oral Daily    traZODone  50 mg Oral Nightly    vitamin C  500 mg Oral Daily    vitamin D  1 tablet Oral Daily    zonisamide  100 mg Oral Daily    vancomycin  1,000 mg Intravenous Q12H    sodium chloride flush  10 mL Intravenous 2 times per day    meropenem  1 g Intravenous Q8H    sodium chloride flush  3 mL Intravenous Q8H    enoxaparin  40 mg Subcutaneous Daily    nicotine  1 patch Transdermal Daily     PRN Meds: ipratropium-albuterol, magnesium hydroxide, oxyCODONE-acetaminophen, LORazepam, sodium chloride flush, oxyCODONE-acetaminophen, magnesium hydroxide, ondansetron    Labs:   Recent Labs     08/15/19  0740   WBC 6.0   HGB 9.2*   HCT 26.8*        Recent Labs     08/15/19  0740      K 3.8   *   CO2 26   BUN 5*   CREATININE 0.26*   CALCIUM 8.2*     No results for input(s): AST, ALT, BILIDIR, BILITOT, ALKPHOS in the last 72 hours. No results for input(s): INR in the last 72 hours. No results for input(s): David Eason in the last 72 hours. Urinalysis:   Lab Results   Component Value Date    NITRU Negative 08/09/2019    WBCUA 0-2 12/20/2016    BACTERIA Many 12/20/2016    RBCUA 0-2 12/20/2016    BLOODU Negative 08/09/2019    SPECGRAV 1.006 08/09/2019    GLUCOSEU Negative 08/09/2019       Radiology:   Most recent    Chest CT      WITH CONTRAST:No results found for this or any previous visit. WITHOUT CONTRAST: No results found for this or any previous visit. CXR      2-view: No results found for this or any previous visit. Portable:   Results for orders placed during the hospital encounter of 07/19/19   XR CHEST PORTABLE    Narrative Portable chest radiograph    History: Line placement    Technique: AP portable view of the chest obtained. Comparison: Chest radiograph from January 14, 2019    Findings:    Right internal jugular central catheter is present and terminates within the SVC. The cardiomediastinal silhouette is within normal limits. No pneumothorax, pleural effusion, or focal consolidation. Multiple chronic right rib fractures noted.  No acute

## 2019-08-16 NOTE — DISCHARGE SUMMARY
Physician Discharge Summary     Patient ID:  Cherie Chatman  [id number]  67 y.o.  1960    Admit date: 8/9/2019    Discharge date : 08/16/19     Admitting Physician: Abelardo Blair MD     Discharge Physician: Shanta Barajas MD     Admission Diagnoses: Wound dehiscence [T81.30XA]    Discharge Diagnoses: Wound cellulitis, dehiscence    Admission Condition: fair    Discharged Condition: good    Hospital Course:  1 - Wound dehiscence with local cellulitis              C/s gen surgery as Dr. Cheryl Pickard is not available at this time to see the patient.  C/S ID to eval for abx regimen.  Start vancomycin and rocephin.  Wound culture.  Pain control.  Does not meet sirs criteria.              8/10 - Appreciate Dr. Ruba Cordova seeing patient, he removed multiple sutures from dehisced wound, await Dr. Cheryl chavez, cont abx              8/11 - vanco infiltrated iv, changed to meropenem and zyvox, await Dr. Cheryl chavez              8/12 - await surgical plans from Dr. Cheryl Pickard              8/13 - to OR today for wound debridement/poss closure              8.14 - post op care, monitor in icu o/n. DC planning soon              8/15 - post op improvmeent, cultures pending, id following, dc soon when abx arranged.               8/16 - appreciate ID recs, 4 weeks iv vancomycin and meropenem at dc    Consults: ID and vascular surgery    Significant Diagnostic Studies: as below    Discharge Exam:  BP (!) 102/41 Comment: notified nurse  Pulse 70   Temp 98.4 °F (36.9 °C) (Oral)   Resp 16   Ht 5' 6\" (1.676 m)   Wt 179 lb 3.7 oz (81.3 kg)   SpO2 100%   BMI 28.93 kg/m²   General appearance: alert, appears stated age and cooperative  Lungs: clear to auscultation bilaterally  Heart: regular rate and rhythm, S1, S2 normal, no murmur, click, rub or gallop  Abdomen: soft, non-tender; bowel sounds normal; no masses,  no organomegaly  Extremities: Right aka stump dressed, clean and intact  Skin: Skin color, texture, turgor normal. No rashes or

## 2019-08-16 NOTE — PROGRESS NOTES
Pt report called to nurse on [id number] [facility] going tp room 46   Belongings with patient    Monitor room called pt mis seen on monitor at this time

## 2019-08-17 LAB
ANAEROBIC CULTURE: ABNORMAL
CULTURE SURGICAL: ABNORMAL
CULTURE SURGICAL: ABNORMAL
GRAM STAIN RESULT: ABNORMAL
ORGANISM: ABNORMAL

## 2019-09-11 ENCOUNTER — OFFICE VISIT (OUTPATIENT)
Dept: INFECTIOUS DISEASES | Age: 59
End: 2019-09-11
Payer: MEDICAID

## 2019-09-11 VITALS
HEIGHT: 63 IN | TEMPERATURE: 97.8 F | HEART RATE: 73 BPM | BODY MASS INDEX: 32.26 KG/M2 | DIASTOLIC BLOOD PRESSURE: 68 MMHG | RESPIRATION RATE: 22 BRPM | SYSTOLIC BLOOD PRESSURE: 132 MMHG

## 2019-09-11 DIAGNOSIS — F03.918 DEMENTIA WITH AGGRESSIVE BEHAVIOR: ICD-10-CM

## 2019-09-11 DIAGNOSIS — L08.9 WOUND INFECTION: Primary | ICD-10-CM

## 2019-09-11 DIAGNOSIS — T14.8XXA WOUND INFECTION: Primary | ICD-10-CM

## 2019-09-11 DIAGNOSIS — I73.9 PAD (PERIPHERAL ARTERY DISEASE) (HCC): ICD-10-CM

## 2019-09-11 DIAGNOSIS — T81.30XD DEHISCENCE OF WOUND OF SKIN, SUBSEQUENT ENCOUNTER: ICD-10-CM

## 2019-09-11 PROCEDURE — 99214 OFFICE O/P EST MOD 30 MIN: CPT | Performed by: INTERNAL MEDICINE

## 2019-09-11 NOTE — PROGRESS NOTES
Zhen Ortiz  1960  female  Medical Record Number: [id number]    Patient informed that I am an Infectious Disease physician and permission obtained from the patient to speak in front of any visitors prior to any discussion for HIPPA purposes. HPI:  60year old female with hx of chronic tobacco use and PVD s/p coronary angio in 2013 with normal coronary arteries. Had extensive stroke in 2013 with right hemiparesis. Patient had AKA due to nonhealing ulcer that was necrotic BKA stump done by Dr Davonte Amaya in 7/2019. Lives in snf. Diabetic. She had her original BKA 3 years ago with hx of attempted revascularization. I treated her in 6/2019 with Vanco and Zosyn. Wound was healing post revascularization and debridement. Could not tolerate the vac. She was sent to the ER on 7/12 due to pain and swelling of the site. AKA done on 7/19. After our last visit, she had further revision and has been on abx x 4 more weeks. ( Gurmeet and Vanco)    Her wound appears to have healed. She continues to smoke stating that she will never quit. She does not know how her glucose levels have been. And she is persistently and loudly chanting \" TAKE IT OUT\" in reference to the picc line over and over, drowning out anything else that I am saying or asking her. Attempts to reason not really working. She laughs and continues to chant.          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     Athscl heart disease of native coronary artery w/o ang pctrs     CAD (coronary artery disease)     Cancer (HCC)     right BKA    Constipation     Constipation     Depression     MAJOR DEPRESSIVE DISORDER    Diabetes mellitus (HCC)     TYPE II    Diabetic neuropathy (HCC)     Hyperlipidemia     Hypertension     Insomnia     Muscle weakness     [facility])     Seizures ([facility] [address]     Vascular dementia     by mouth daily      furosemide (LASIX) 20 MG tablet Take 40 mg by mouth daily       magnesium hydroxide (MILK OF MAGNESIA) 400 MG/5ML suspension Take 30 mLs by mouth daily as needed for Constipation      phenytoin (DILANTIN) 100 MG ER capsule Take 100 mg by mouth Daily with lunch       metoprolol succinate (TOPROL XL) 25 MG extended release tablet Take 25 mg by mouth daily      zonisamide (ZONEGRAN) 100 MG capsule Take 100 mg by mouth daily       No current facility-administered medications on file prior to visit. Physical Exam:  NAD  General: chanting. loudly. Skin: no new rashes, no wound dehiscence. There is no cellulitis. Sutures are in place. Picc in the arm no surrounding cellulitis or swelling  HEENT: EOMI, MMM, Neck is supple  Heart: S1 S2  Lungs: bilaterally clear   Abdomen: soft, ND, NTTP, +BS  Stump as above. Neuro exam: CN II-XII intact    Labs: I have reviewed all lab results by electronic record, including most recent CBC, metabolic panel, and pertinent abnormalities were addressed from an infectious disease perspective. WBC trends are being monitored. Lab Results   Component Value Date     08/15/2019    K 3.8 08/15/2019    K 4.0 08/10/2019     08/15/2019    CO2 26 08/15/2019    BUN 5 08/15/2019    CREATININE 0.26 08/15/2019    GLUCOSE 101 08/15/2019    GLUCOSE 94 12/28/2011    CALCIUM 8.2 08/15/2019      Lab Results   Component Value Date    WBC 6.0 08/15/2019    HGB 9.2 (L) 08/15/2019    HCT 26.8 (L) 08/15/2019    MCV 94.9 08/15/2019     08/15/2019       Radiology:   I have reviewed imaging results per electronic record and most pertinent abnormalities are being addressed from an infectious disease standpoint. Assessment:  Patient with hx of PVD and current tobacco abuse with large necrotic infected drainage wound of the R stump s/p multiple revisions and extended abx courses   Hx of CVA    Plan: Will dc picc.    Follow up as needed with JERARDO CHU

## 2022-04-26 ENCOUNTER — OFFICE VISIT (OUTPATIENT)
Dept: GERIATRIC MEDICINE | Age: 62
End: 2022-04-26
Payer: MEDICAID

## 2022-04-26 DIAGNOSIS — F33.9 MAJOR DEPRESSION, RECURRENT, CHRONIC (HCC): ICD-10-CM

## 2022-04-26 DIAGNOSIS — F01.518 VASCULAR DEMENTIA WITH BEHAVIOR DISTURBANCE: Primary | ICD-10-CM

## 2022-04-26 PROCEDURE — 99308 SBSQ NF CARE LOW MDM 20: CPT | Performed by: PSYCHIATRY & NEUROLOGY

## 2022-04-26 NOTE — PROGRESS NOTES
[facility]. FOLLOW-UP NOTE       4/26/2022     Patient was seen and examined in person, Chart reviewed   Patient's case discussed with staff/team    Chief Complaint: Depression    Interim History:     Pt has been feeling more or less at baseline mood  Rate her mood to be 5/10  Has been anxious on and off  Sleep and appetite better  No active SI or HI  No AH or VH  Not paranoid  Cognitive decline ++  In wheel chair but has remain active  Appetite:   [x] Normal/Unchanged  [] Increased  [] Decreased      Sleep:       [] Normal/Unchanged  [x] Fair       [] Poor              Energy:    [] Normal/Unchanged  [] Increased  [x] Decreased        SI [] Present  [x] Absent    HI  []Present  [x] Absent     Aggression:  [] yes  [x] no    Patient is [x] able  [] unable to CONTRACT FOR SAFETY     PAST MEDICAL/PSYCHIATRIC HISTORY:   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     Athscl heart disease of native coronary artery w/o ang pctrs     CAD (coronary artery disease)     Cancer ([facility]     right BKA    Constipation     Constipation     Depression     MAJOR DEPRESSIVE DISORDER    Diabetes mellitus ([facility]     TYPE II    Diabetic neuropathy ([facility]     Hyperlipidemia     Hypertension     Insomnia     Muscle weakness     PVD (peripheral vascular disease) ([facility]     Seizures ([facility]     Vascular dementia     Vitamin D deficiency     Weakness        FAMILY/SOCIAL HISTORY:  No family history on file.   Social History     Socioeconomic History    Marital status: Single     Spouse name: Not on file    Number of children: Not on file    Years of education: Not on file    Highest education level: Not on file   Occupational History    Not on file   Tobacco Use    Smoking status: Current Every Day Smoker     Packs/day: 0.50     Years: 13.00     Pack years: 6.50     Types: Cigarettes    Smokeless tobacco: Never Used   Vaping Use    Vaping Use: Never used   Substance and Sexual Activity    Alcohol use: No     Comment: in nursing home for 3 years    Drug use: No    Sexual activity: Not Currently   Other Topics Concern    Not on file   Social History Narrative    Not on file     Social Determinants of Health     Financial Resource Strain:     Difficulty of Paying Living Expenses: Not on file   Food Insecurity:     Worried About Running Out of Food in the Last Year: Not on file    Mei of Food in the Last Year: Not on file   Transportation Needs:     Lack of Transportation (Medical): Not on file    Lack of Transportation (Non-Medical): Not on file   Physical Activity:     Days of Exercise per Week: Not on file    Minutes of Exercise per Session: Not on file   Stress:     Feeling of Stress : Not on file   Social Connections:     Frequency of Communication with Friends and Family: Not on file    Frequency of Social Gatherings with Friends and Family: Not on file    Attends Jew Services: Not on file    Active Member of [address] [place] [address] or Organizations: Not on file    Attends Club or Organization Meetings: Not on file    Marital Status: Not on file   Intimate Partner Violence:     Fear of Current or Ex-Partner: Not on file    Emotionally Abused: Not on file    Physically Abused: Not on file    Sexually Abused: Not on file   Housing Stability:     Unable to Pay for Housing in the Last Year: Not on file    Number of [place] in the Last Year: Not on file    Unstable Housing in the Last Year: Not on file           ROS:  [x] All negative/unchanged except if checked.  Explain positive(checked items) below:  [] Constitutional  [] Eyes  [] Ear/Nose/Mouth/Throat  [] Respiratory  [] CV  [] GI  []   [] Musculoskeletal  [] Skin/Breast  [] Neurological  [] Endocrine  [] Heme/Lymph  [] Allergic/Immunologic    Explanation:     MEDICATIONS:    Current Outpatient Medications:     ferrous sulfate 325 (65 Fe) MG tablet, Take 325 mg by mouth every 12 hours, Disp: , Rfl:     folic acid (FOLVITE) 1 MG tablet, Take 1 mg by mouth daily, Disp: , Rfl:     divalproex (DEPAKOTE ER) 250 MG extended release tablet, Take 250 mg by mouth 3 times daily, Disp: , Rfl:     apixaban (ELIQUIS) 5 MG TABS tablet, Take 1 tablet by mouth 2 times daily, Disp: 60 tablet, Rfl: 3    losartan (COZAAR) 25 MG tablet, Take 1 tablet by mouth daily, Disp: 30 tablet, Rfl: 3    Lactobacillus (ACIDOPHILUS) CAPS capsule, Take 1 capsule by mouth daily, Disp: , Rfl:     vitamin C (ASCORBIC ACID) 500 MG tablet, Take 500 mg by mouth daily, Disp: , Rfl:     potassium chloride (MICRO-K) 10 MEQ extended release capsule, Take 10 mEq by mouth daily, Disp: , Rfl:     ipratropium-albuterol (DUONEB) 0.5-2.5 (3) MG/3ML SOLN nebulizer solution, Inhale 1 vial into the lungs every 8 hours as needed for Shortness of Breath (COPD), Disp: , Rfl:     DULoxetine (CYMBALTA) 30 MG extended release capsule, Take 1 capsule by mouth daily, Disp: 30 capsule, Rfl: 3    phenytoin (DILANTIN) 100 MG ER capsule, Take 200 mg by mouth 2 times daily MORNING AND AT BEDTIME, Disp: , Rfl:     traZODone (DESYREL) 50 MG tablet, Take 50 mg by mouth nightly, Disp: , Rfl:     docusate sodium (COLACE) 100 MG capsule, Take 100 mg by mouth every morning, Disp: , Rfl:     aspirin 81 MG chewable tablet, Take 81 mg by mouth daily, Disp: , Rfl:     atorvastatin (LIPITOR) 10 MG tablet, Take 10 mg by mouth nightly , Disp: , Rfl:     vitamin D 1000 UNITS CAPS, Take 1 capsule by mouth daily , Disp: , Rfl:     isosorbide mononitrate (IMDUR) 30 MG extended release tablet, Take 30 mg by mouth daily, Disp: , Rfl:     furosemide (LASIX) 20 MG tablet, Take 40 mg by mouth daily , Disp: , Rfl:     magnesium hydroxide (MILK OF MAGNESIA) 400 MG/5ML suspension, Take 30 mLs by mouth daily as needed for Constipation, Disp: , Rfl:     phenytoin (DILANTIN) 100 MG ER capsule, Take 100 mg by mouth Daily with lunch , Disp: , Rfl:     metoprolol succinate (TOPROL XL) 25 MG extended release tablet, Take 25 mg by mouth daily, Disp: , Rfl:     zonisamide (ZONEGRAN) 100 MG capsule, Take 100 mg by mouth daily, Disp: , Rfl:   MEDICATION SIDE EFFECTS: no      Examination:  There were no vitals taken for this visit. Gait - in wheel chair      Mental Status Examination:    Level of consciousness:  within normal limits   Appearance:  fair grooming and fair hygiene  Behavior/Motor:  psychomotor retardation  Attitude toward examiner:  cooperative  Speech:  normal rate   Mood: anxious  Affect:  mood congruent  Thought processes:  coherent   Thought content:  Suicidal Ideation:  denies suicidal ideation  Cognition:  oriented to person, place, and time   Concentration intact  Insight fair   Judgement fair     ASSESSMENT:   Patient symptoms are:  [] Well controlled  [] Improving  [] Worsening  [] No change      Diagnosis:   MDD recurrent chronic  Vascular dementia with BD  CEM    LABS:  No visits with results within 2 Day(s) from this visit. Latest known visit with results is:   Orders Only on 11/26/2019   Component Date Value Ref Range Status    Valproic Acid Lvl 11/26/2019 8.6* 50.0 - 100.0 ug/mL Final         Treatment Plan:  Reviewed current Medications with the patient. Continue buspar, trazodone and cymbalta as ordered  Risks, benefits, side effects, drug-to-drug interactions and alternatives to treatment were discussed. Discussed with the nurse about the treatment plan.   Future labs   Call me if needed   Follow up in 3 months      GRADUAL DOSE REDUCTION ATTEMPTED : [] YES    [x] NO      REASON FOR NOT REDUCING MEDICATION DOSE:   [] NA  [x] HIGH RISK OF PATIENT DETERIORATION  [] MEDICATION RECENTLY REDUCED  [] PRIOR MEDICATION DOSE REDUCTION UNSUCCESSFUL  [] Other            Electronically signed by Saman Arguello MD on 4/26/2022 at 6:50 PM

## 2022-07-13 ENCOUNTER — HOSPITAL ENCOUNTER (EMERGENCY)
Age: 62
Discharge: HOME | End: 2022-07-14
Attending: STUDENT IN AN ORGANIZED HEALTH CARE EDUCATION/TRAINING PROGRAM
Payer: MEDICAID

## 2022-07-13 DIAGNOSIS — S06.9X0S TRAUMATIC BRAIN INJURY, WITHOUT LOSS OF CONSCIOUSNESS, SEQUELA (HCC): ICD-10-CM

## 2022-07-13 DIAGNOSIS — F03.91 DEMENTIA WITH BEHAVIORAL DISTURBANCE, UNSPECIFIED DEMENTIA TYPE: Primary | ICD-10-CM

## 2022-07-13 LAB
BILIRUBIN URINE: NEGATIVE
BLOOD, URINE: NEGATIVE
CHP ED QC CHECK: NORMAL
CLARITY: CLEAR
COLOR: YELLOW
GLUCOSE URINE: NEGATIVE MG/DL
KETONES, URINE: NEGATIVE MG/DL
LEUKOCYTE ESTERASE, URINE: NEGATIVE
NITRITE, URINE: NEGATIVE
PH UA: 7.5 (ref 5–9)
PREGNANCY TEST URINE, POC: NEGATIVE
PROTEIN UA: NEGATIVE MG/DL
SARS-COV-2, NAAT: NOT DETECTED
SPECIFIC GRAVITY UA: 1.01 (ref 1–1.03)
URINE REFLEX TO CULTURE: NORMAL
UROBILINOGEN, URINE: 0.2 E.U./DL

## 2022-07-13 PROCEDURE — 82077 ASSAY SPEC XCP UR&BREATH IA: CPT

## 2022-07-13 PROCEDURE — 87635 SARS-COV-2 COVID-19 AMP PRB: CPT

## 2022-07-13 PROCEDURE — 93005 ELECTROCARDIOGRAM TRACING: CPT | Performed by: STUDENT IN AN ORGANIZED HEALTH CARE EDUCATION/TRAINING PROGRAM

## 2022-07-13 PROCEDURE — 83735 ASSAY OF MAGNESIUM: CPT

## 2022-07-13 PROCEDURE — 80179 DRUG ASSAY SALICYLATE: CPT

## 2022-07-13 PROCEDURE — 80143 DRUG ASSAY ACETAMINOPHEN: CPT

## 2022-07-13 PROCEDURE — 36415 COLL VENOUS BLD VENIPUNCTURE: CPT

## 2022-07-13 PROCEDURE — 82550 ASSAY OF CK (CPK): CPT

## 2022-07-13 PROCEDURE — 99284 EMERGENCY DEPT VISIT MOD MDM: CPT

## 2022-07-13 PROCEDURE — 80307 DRUG TEST PRSMV CHEM ANLYZR: CPT

## 2022-07-13 PROCEDURE — 84443 ASSAY THYROID STIM HORMONE: CPT

## 2022-07-13 PROCEDURE — 81003 URINALYSIS AUTO W/O SCOPE: CPT

## 2022-07-13 PROCEDURE — 80053 COMPREHEN METABOLIC PANEL: CPT

## 2022-07-13 PROCEDURE — 80061 LIPID PANEL: CPT

## 2022-07-13 PROCEDURE — 85025 COMPLETE CBC W/AUTO DIFF WBC: CPT

## 2022-07-13 RX ORDER — HYDROXYZINE HYDROCHLORIDE 25 MG/1
25 TABLET, FILM COATED ORAL ONCE
Status: COMPLETED | OUTPATIENT
Start: 2022-07-13 | End: 2022-07-14

## 2022-07-13 RX ORDER — TRAZODONE HYDROCHLORIDE 50 MG/1
50 TABLET ORAL ONCE
Status: COMPLETED | OUTPATIENT
Start: 2022-07-13 | End: 2022-07-14

## 2022-07-13 ASSESSMENT — PAIN DESCRIPTION - PAIN TYPE: TYPE: ACUTE PAIN

## 2022-07-13 ASSESSMENT — PAIN - FUNCTIONAL ASSESSMENT: PAIN_FUNCTIONAL_ASSESSMENT: 0-10

## 2022-07-13 ASSESSMENT — PAIN DESCRIPTION - LOCATION: LOCATION: FACE

## 2022-07-13 ASSESSMENT — PAIN DESCRIPTION - FREQUENCY: FREQUENCY: CONTINUOUS

## 2022-07-13 ASSESSMENT — PAIN DESCRIPTION - DESCRIPTORS: DESCRIPTORS: SORE

## 2022-07-13 ASSESSMENT — PAIN DESCRIPTION - ORIENTATION: ORIENTATION: LEFT

## 2022-07-13 ASSESSMENT — PAIN SCALES - GENERAL: PAINLEVEL_OUTOF10: 10

## 2022-07-14 VITALS
DIASTOLIC BLOOD PRESSURE: 54 MMHG | SYSTOLIC BLOOD PRESSURE: 122 MMHG | OXYGEN SATURATION: 93 % | TEMPERATURE: 99 F | HEIGHT: 62 IN | RESPIRATION RATE: 16 BRPM | HEART RATE: 68 BPM | WEIGHT: 159 LBS | BODY MASS INDEX: 29.26 KG/M2

## 2022-07-14 LAB
ACETAMINOPHEN LEVEL: <5 UG/ML (ref 10–30)
ALBUMIN SERPL-MCNC: 4.1 G/DL (ref 3.5–4.6)
ALP BLD-CCNC: 113 U/L (ref 40–130)
ALT SERPL-CCNC: 9 U/L (ref 0–33)
AMPHETAMINE SCREEN, URINE: NORMAL
ANION GAP SERPL CALCULATED.3IONS-SCNC: 11 MEQ/L (ref 9–15)
AST SERPL-CCNC: 12 U/L (ref 0–35)
BARBITURATE SCREEN URINE: NORMAL
BASOPHILS ABSOLUTE: 0.1 K/UL (ref 0–0.2)
BASOPHILS RELATIVE PERCENT: 0.7 %
BENZODIAZEPINE SCREEN, URINE: NORMAL
BILIRUB SERPL-MCNC: <0.2 MG/DL (ref 0.2–0.7)
BUN BLDV-MCNC: 14 MG/DL (ref 8–23)
CALCIUM SERPL-MCNC: 9.3 MG/DL (ref 8.5–9.9)
CANNABINOID SCREEN URINE: NORMAL
CHLORIDE BLD-SCNC: 106 MEQ/L (ref 95–107)
CHOLESTEROL, TOTAL: 172 MG/DL (ref 0–199)
CO2: 23 MEQ/L (ref 20–31)
COCAINE METABOLITE SCREEN URINE: NORMAL
CREAT SERPL-MCNC: 0.56 MG/DL (ref 0.5–0.9)
EKG ATRIAL RATE: 61 BPM
EKG P AXIS: 74 DEGREES
EKG P-R INTERVAL: 190 MS
EKG Q-T INTERVAL: 468 MS
EKG QRS DURATION: 164 MS
EKG QTC CALCULATION (BAZETT): 471 MS
EKG R AXIS: -48 DEGREES
EKG T AXIS: 102 DEGREES
EKG VENTRICULAR RATE: 61 BPM
EOSINOPHILS ABSOLUTE: 0.3 K/UL (ref 0–0.7)
EOSINOPHILS RELATIVE PERCENT: 3.5 %
ETHANOL PERCENT: NORMAL G/DL
ETHANOL: <10 MG/DL (ref 0–0.08)
FENTANYL SCREEN, URINE: NORMAL
GFR AFRICAN AMERICAN: >60
GFR NON-AFRICAN AMERICAN: >60
GLOBULIN: 2.5 G/DL (ref 2.3–3.5)
GLUCOSE BLD-MCNC: 89 MG/DL (ref 70–99)
HCT VFR BLD CALC: 47.1 % (ref 37–47)
HDLC SERPL-MCNC: 77 MG/DL (ref 40–59)
HEMOGLOBIN: 15.6 G/DL (ref 12–16)
LDL CHOLESTEROL CALCULATED: 76 MG/DL (ref 0–129)
LYMPHOCYTES ABSOLUTE: 3 K/UL (ref 1–4.8)
LYMPHOCYTES RELATIVE PERCENT: 31.7 %
Lab: NORMAL
MAGNESIUM: 2.2 MG/DL (ref 1.7–2.4)
MCH RBC QN AUTO: 33.5 PG (ref 27–31.3)
MCHC RBC AUTO-ENTMCNC: 33.2 % (ref 33–37)
MCV RBC AUTO: 101 FL (ref 82–100)
METHADONE SCREEN, URINE: NORMAL
MONOCYTES ABSOLUTE: 0.7 K/UL (ref 0.2–0.8)
MONOCYTES RELATIVE PERCENT: 7.3 %
NEUTROPHILS ABSOLUTE: 5.5 K/UL (ref 1.4–6.5)
NEUTROPHILS RELATIVE PERCENT: 56.8 %
OPIATE SCREEN URINE: NORMAL
OXYCODONE URINE: NORMAL
PDW BLD-RTO: 13.6 % (ref 11.5–14.5)
PHENCYCLIDINE SCREEN URINE: NORMAL
PLATELET # BLD: 173 K/UL (ref 130–400)
POTASSIUM SERPL-SCNC: 4.1 MEQ/L (ref 3.4–4.9)
PROPOXYPHENE SCREEN: NORMAL
RBC # BLD: 4.66 M/UL (ref 4.2–5.4)
SALICYLATE, SERUM: <0.3 MG/DL (ref 15–30)
SODIUM BLD-SCNC: 140 MEQ/L (ref 135–144)
TOTAL CK: 65 U/L (ref 0–170)
TOTAL PROTEIN: 6.6 G/DL (ref 6.3–8)
TRIGL SERPL-MCNC: 95 MG/DL (ref 0–150)
TSH REFLEX: 1.49 UIU/ML (ref 0.44–3.86)
WBC # BLD: 9.6 K/UL (ref 4.8–10.8)

## 2022-07-14 PROCEDURE — 6370000000 HC RX 637 (ALT 250 FOR IP): Performed by: STUDENT IN AN ORGANIZED HEALTH CARE EDUCATION/TRAINING PROGRAM

## 2022-07-14 RX ADMIN — TRAZODONE HYDROCHLORIDE 50 MG: 50 TABLET ORAL at 00:42

## 2022-07-14 RX ADMIN — HYDROXYZINE HYDROCHLORIDE 25 MG: 25 TABLET ORAL at 00:42

## 2022-07-14 NOTE — ED PROVIDER NOTES
[address] [facility] ED  eMERGENCY dEPARTMENT eNCOUnter      Pt Name: Popeye Vivas  MRN: [id number]  [place] 1960  Date of evaluation: 7/13/2022  Provider: Jessi Sen MD      HISTORY OF PRESENT ILLNESS      Chief Complaint   Patient presents with    Psychiatric Evaluation       The history is provided by the Patient. Popeye Vivas is a 57 y.o. female with a PMH clinically significant for HTN, HLD, CAD, DMII, pAfib, PVD, Seizures, Depression, Bipolar d/o and Dementia with aggressive behaviors presenting to the ED via EMS sent from SNF due to dementia with aggressive behaviors with patient lashing out at staff at the facility. Desire for patient to be evaluated psychiatrically for possible placement at [facility]. Patient without acute complaints in the ED. States she is feeling well and would like to have a cigarette. Denies any pain. Does not know why she is in the ED and would like to go back to the SNF. Per Chart Review: Most recent evaluation by Dr. Erum Campos on 4/26/2022 appreciated. Noted patient stable at that time. Plan to continue BuSpar, trazodone and Cymbalta.     REVIEW OF SYSTEMS       Review of Systems   Unable to perform ROS: Dementia     PAST MEDICAL HISTORY     Past Medical History:   Diagnosis Date    Altered mental status, unspecified     Aphasia     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     Athscl heart disease of native coronary artery w/o ang pctrs     CAD (coronary artery disease)     Cancer (HCC)     right BKA    Constipation     Constipation     Depression     MAJOR DEPRESSIVE DISORDER    Diabetes mellitus (HCC)     TYPE II    Diabetic neuropathy (HCC)     Hyperlipidemia     Hypertension     Insomnia     Muscle weakness     PVD (peripheral vascular disease) (HCC)     Seizures (HCC)     Vascular dementia ([facility]     Vitamin D deficiency     Weakness        SURGICAL HISTORY       Past Surgical History:   Procedure Laterality Date    AMPUTATION ABOVE KNEE Right 7/19/2019    RIGHT ABOVE KNEE LEG AMPUTATION performed by Donnell Verma MD at [address] Right 8/13/2019    REVISION AND DEBRIDEMENT OF RIGHT AKA performed by Donnell Verma MD at [facility]    ARTERIOGRAM Right 6/11/2019    RIGHT ARM AORTO FEMORAL DIGITAL SUBTRACTION ARTERIOGRAM performed by Donnell Verma MD at [address] Right 6/11/2019    REVISION AND REVASCULARIZATION RIGHT LOWER EXTREMITY performed by Donnell Vemra MD at [address] Right        FAMILY HISTORY     History reviewed. No pertinent family history.     SOCIAL HISTORY       Social History     Socioeconomic History    Marital status: Single     Spouse name: None    Number of children: None    Years of education: None    Highest education level: None   Tobacco Use    Smoking status: Every Day     Packs/day: 0.50     Years: 13.00     Pack years: 6.50     Types: Cigarettes    Smokeless tobacco: Never   Vaping Use    Vaping Use: Never used   Substance and Sexual Activity    Alcohol use: No     Comment: in nursing home for 3 years    Drug use: No    Sexual activity: Not Currently       CURRENT MEDICATIONS       Discharge Medication List as of 7/14/2022  1:54 AM        CONTINUE these medications which have NOT CHANGED    Details   ferrous sulfate 325 (65 Fe) MG tablet Take 325 mg by mouth every 12 hoursHistorical Med      folic acid (FOLVITE) 1 MG tablet Take 1 mg by mouth dailyHistorical Med      divalproex (DEPAKOTE ER) 250 MG extended release tablet Take 250 mg by mouth 3 times dailyHistorical Med      apixaban (ELIQUIS) 5 MG TABS tablet Take 1 tablet by mouth 2 times daily, Disp-60 tablet, R-3Normal      losartan (COZAAR) 25 MG tablet Take 1 tablet by mouth daily, Disp-30 tablet, R-3Normal      Lactobacillus (ACIDOPHILUS) CAPS capsule Take 1 capsule by mouth dailyHistorical Med      vitamin C (ASCORBIC ACID) 500 MG tablet Take 500 mg by mouth dailyHistorical Med potassium chloride (MICRO-K) 10 MEQ extended release capsule Take 10 mEq by mouth dailyHistorical Med      ipratropium-albuterol (DUONEB) 0.5-2.5 (3) MG/3ML SOLN nebulizer solution Inhale 1 vial into the lungs every 8 hours as needed for Shortness of Breath (COPD)Historical Med      DULoxetine (CYMBALTA) 30 MG extended release capsule Take 1 capsule by mouth daily, Disp-30 capsule, R-3NO PRINT      !! phenytoin (DILANTIN) 100 MG ER capsule Take 200 mg by mouth 2 times daily MORNING AND AT BEDTIMEHistorical Med      traZODone (DESYREL) 50 MG tablet Take 50 mg by mouth nightlyHistorical Med      docusate sodium (COLACE) 100 MG capsule Take 100 mg by mouth every morningHistorical Med      aspirin 81 MG chewable tablet Take 81 mg by mouth daily      atorvastatin (LIPITOR) 10 MG tablet Take 10 mg by mouth nightly Historical Med      vitamin D 1000 UNITS CAPS Take 1 capsule by mouth daily Historical Med      isosorbide mononitrate (IMDUR) 30 MG extended release tablet Take 30 mg by mouth daily      furosemide (LASIX) 20 MG tablet Take 40 mg by mouth daily Historical Med      magnesium hydroxide (MILK OF MAGNESIA) 400 MG/5ML suspension Take 30 mLs by mouth daily as needed for Constipation      ! ! phenytoin (DILANTIN) 100 MG ER capsule Take 100 mg by mouth Daily with lunch Historical Med      metoprolol succinate (TOPROL XL) 25 MG extended release tablet Take 25 mg by mouth daily      zonisamide (ZONEGRAN) 100 MG capsule Take 100 mg by mouth daily       ! ! - Potential duplicate medications found. Please discuss with provider. ALLERGIES     Patient has no known allergies. PHYSICAL EXAM       ED Triage Vitals [07/13/22 2155]   BP Temp Temp Source Heart Rate Resp SpO2 Height Weight   132/69 99 °F (37.2 °C) Oral 73 18 94 % 5' 2\" (1.575 m) 159 lb (72.1 kg)       Physical Exam  Vitals and nursing note reviewed. Constitutional:       General: She is not in acute distress. Appearance: She is not ill-appearing. HENT:      Head: Atraumatic. Comments: S/p remote TBI with deformity     Mouth/Throat:      Mouth: Mucous membranes are moist.      Pharynx: Oropharynx is clear. Eyes:      Pupils: Pupils are equal, round, and reactive to light. Cardiovascular:      Rate and Rhythm: Normal rate. Pulses: Normal pulses. Pulmonary:      Effort: Pulmonary effort is normal. No respiratory distress. Breath sounds: Normal breath sounds. Abdominal:      Palpations: Abdomen is soft. Tenderness: There is no abdominal tenderness. Musculoskeletal:      Cervical back: Neck supple. No tenderness. Right lower leg: No edema. Left lower leg: No edema. Skin:     General: Skin is warm and dry. Capillary Refill: Capillary refill takes less than 2 seconds. Neurological:      Mental Status: She is alert. Mental status is at baseline. Psychiatric:         Mood and Affect: Mood normal.         Behavior: Behavior normal. Behavior is cooperative.        MDM:   Chart Reviewed: PMH and additional information as noted in HPI obtained from chart review    Vitals:    Vitals:    07/13/22 2155 07/13/22 2230 07/13/22 2300   BP: 132/69 (!) 120/53 (!) 122/54   Pulse: 73 70 68   Resp: 18 16 16   Temp: 99 °F (37.2 °C)     TempSrc: Oral     SpO2: 94% 96% 93%   Weight: 159 lb (72.1 kg)     Height: 5' 2\" (1.575 m)         PROCEDURES:  Unless otherwise noted below, none  Procedures    LABS:  Labs Reviewed   CBC WITH AUTO DIFFERENTIAL - Abnormal; Notable for the following components:       Result Value    Hematocrit 47.1 (*)     .0 (*)     MCH 33.5 (*)     All other components within normal limits   LIPID PANEL - Abnormal; Notable for the following components:    HDL 77 (*)     All other components within normal limits   SALICYLATE LEVEL - Abnormal; Notable for the following components:    Salicylate, Serum <2.6 (*)     All other components within normal limits   ACETAMINOPHEN LEVEL - Abnormal; Notable for the following components:    Acetaminophen Level <5 (*)     All other components within normal limits   POCT URINE PREGNANCY - Normal   COVID-19, RAPID   COMPREHENSIVE METABOLIC PANEL   MAGNESIUM   CK   TSH WITH REFLEX   ETHANOL   URINALYSIS WITH REFLEX TO CULTURE   URINE DRUG SCREEN       No orders to display       ED Course as of 07/20/22 0917 Wed Jul 13, 2022 2315 EKG 12 Lead  EKG showing normal sinus rhythm, rate of 61 bpm.  Left axis deviation. LBBB. No gross acute ST-T wave abnormalities. [NA]   2315 Pregnancy, Urine: negative [NA]   2344 SARS-CoV-2, NAAT: Not Detected [NA]      ED Course User Index  [NA] Nida Macdonald MD       57 y.o. female with a PMH clinically significant for HTN, HLD, CAD, DMII, pAfib, PVD, Seizures, Depression, Bipolar d/o and Dementia with aggressive behaviors presenting to the ED via EMS sent from SNF due to dementia with aggressive behaviors with patient lashing out at staff at the facility. Upon initial evaluation, Pt Afebrile, HDS and in NAD. PE as noted above. Labs,  and EKG, as noted above. Given findings, clinical presentation most likely consistent w/ Dementia with behavioral abnls. No evidence of acute infectious processes worsening symptoms. Hx of TBI. Assessed by [facility] who did not appreciate any indications for transfer from current facility as the Pt is acting largely at her baseline and c/w previous TBI. Calm, cooperative and without any aggressive behaviors noted in the ED. Will therefore be transferred back to SNF. Pt was administered   Medications   hydrOXYzine HCl (ATARAX) tablet 25 mg (25 mg Oral Given 7/14/22 0042)   traZODone (DESYREL) tablet 50 mg (50 mg Oral Given 7/14/22 0042)       Plan: Discharge home in good condition with meds as noted below and instructions to follow up with PCP . Pt stable and appropriate for further evaluation and management as an outpatient. and Patient understanding and amenable to the POC.     CRITICAL CARE TIME   Total CriticalCare time was 0 minutes, excluding separately reportable procedures. There was a high probability of clinically significant/life threatening deterioration in the patient's condition which required my urgent intervention. FINAL IMPRESSION      1. Dementia with behavioral disturbance, unspecified dementia type ([facility]    2.  Traumatic brain injury, without loss of consciousness, sequela [facility])          DISPOSITION/PLAN   DISPOSITION Decision To Discharge 07/14/2022 01:53:54 AM      Discharge Medication List as of 7/14/2022  1:54 AM           MD Robbin Morales MD  07/20/22 5321
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
## 2022-07-14 NOTE — ED NOTES
Anne Line Nurse manager from [facility] called for update and to see why [facility] would not take the patient. This nurse informed them that having a TBI was an exclusion for [facility]. She said we will have to take the patient back and would inform the nurses that the patient would be returning.       Otto Lockwood, RN  07/13/22 9759 Ac Mclaughlin RN  07/13/22 9181

## 2022-07-14 NOTE — ED NOTES
Spoke with Osorio from [address] [place] [address] and informed her that the patient can not go to [address] [place] [address]. Patient would be returning to there facility, she said ok.       Kiley Martinez RN  07/13/22 [id number]

## 2022-08-02 ENCOUNTER — OFFICE VISIT (OUTPATIENT)
Dept: GERIATRIC MEDICINE | Age: 62
End: 2022-08-02
Payer: MEDICAID

## 2022-08-02 DIAGNOSIS — F33.9 CHRONIC MAJOR DEPRESSIVE DISORDER, RECURRENT EPISODE (HCC): Primary | ICD-10-CM

## 2022-08-02 PROCEDURE — 99308 SBSQ NF CARE LOW MDM 20: CPT | Performed by: PSYCHIATRY & NEUROLOGY

## 2022-12-26 ENCOUNTER — HOSPITAL ENCOUNTER (EMERGENCY)
Age: 62
Discharge: HOME | End: 2022-12-26
Attending: EMERGENCY MEDICINE
Payer: MEDICAID

## 2022-12-26 VITALS
SYSTOLIC BLOOD PRESSURE: 118 MMHG | BODY MASS INDEX: 28.35 KG/M2 | HEART RATE: 92 BPM | TEMPERATURE: 98.9 F | WEIGHT: 160 LBS | OXYGEN SATURATION: 91 % | HEIGHT: 63 IN | RESPIRATION RATE: 18 BRPM | DIASTOLIC BLOOD PRESSURE: 84 MMHG

## 2022-12-26 DIAGNOSIS — F03.C11 SEVERE DEMENTIA WITH AGITATION, UNSPECIFIED DEMENTIA TYPE: Primary | ICD-10-CM

## 2022-12-26 PROCEDURE — 6360000002 HC RX W HCPCS: Performed by: EMERGENCY MEDICINE

## 2022-12-26 PROCEDURE — 96372 THER/PROPH/DIAG INJ SC/IM: CPT

## 2022-12-26 PROCEDURE — 99284 EMERGENCY DEPT VISIT MOD MDM: CPT

## 2022-12-26 RX ORDER — HALOPERIDOL 5 MG/ML
5 INJECTION INTRAMUSCULAR ONCE
Status: COMPLETED | OUTPATIENT
Start: 2022-12-26 | End: 2022-12-26

## 2022-12-26 RX ORDER — LORAZEPAM 2 MG/ML
1 INJECTION INTRAMUSCULAR ONCE
Status: COMPLETED | OUTPATIENT
Start: 2022-12-26 | End: 2022-12-26

## 2022-12-26 RX ADMIN — HALOPERIDOL LACTATE 5 MG: 5 INJECTION, SOLUTION INTRAMUSCULAR at 17:17

## 2022-12-26 RX ADMIN — LORAZEPAM 1 MG: 2 INJECTION INTRAMUSCULAR; INTRAVENOUS at 17:19

## 2022-12-26 ASSESSMENT — ENCOUNTER SYMPTOMS
NAUSEA: 0
VOMITING: 0
EYE PAIN: 0
SORE THROAT: 0
SHORTNESS OF BREATH: 0
CHEST TIGHTNESS: 0
ABDOMINAL PAIN: 0

## 2022-12-26 ASSESSMENT — PAIN DESCRIPTION - LOCATION: LOCATION: HEAD

## 2022-12-26 ASSESSMENT — PAIN SCALES - GENERAL: PAINLEVEL_OUTOF10: 10

## 2022-12-26 ASSESSMENT — PAIN - FUNCTIONAL ASSESSMENT: PAIN_FUNCTIONAL_ASSESSMENT: 0-10

## 2022-12-26 NOTE — ED PROVIDER NOTES
[address] [facility] ED  EMERGENCY DEPARTMENT ENCOUNTER      Pt Name: Joseluis Mckeon  MRN: [id number]  [place] 1960  Date of evaluation: 12/26/2022  Provider: Atul Richards DO    CHIEF COMPLAINT       Chief Complaint   Patient presents with    Aggressive Behavior         HISTORY OF PRESENT ILLNESS   (Location/Symptom, Timing/Onset, Context/Setting, Quality, Duration, Modifying Factors, Severity)  Note limiting factors. Joseluis Mckeon is a 60 y.o. female who presents to the emergency department . Patient with history of dementia with agitation was sent here from nursing home due to agitation. Despite the fact that she has this diagnosis they do not have any as needed medications listed for staff to give her. They did not call the physician from nursing home for an order and instead is sending the patient to the ER for agitation. Patient just tested positive for COVID. There is nothing new with this patient's behavior. HPI    Nursing Notes were reviewed. REVIEW OF SYSTEMS    (2-9 systems for level 4, 10 or more for level 5)     Review of Systems   Constitutional:  Negative for activity change, appetite change and fatigue. HENT:  Negative for congestion and sore throat. Eyes:  Negative for pain and visual disturbance. Respiratory:  Negative for chest tightness and shortness of breath. Cardiovascular:  Negative for chest pain. Gastrointestinal:  Negative for abdominal pain, nausea and vomiting. Endocrine: Negative for polydipsia. Genitourinary:  Negative for flank pain and urgency. Musculoskeletal:  Negative for gait problem and neck stiffness. Skin:  Negative for rash. Neurological:  Negative for weakness, light-headedness and headaches. Psychiatric/Behavioral:  Positive for agitation. Negative for confusion and sleep disturbance. Except as noted above the remainder of the review of systems was reviewed and negative.        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     Athscl heart disease of native coronary artery w/o ang pctrs     CAD (coronary artery disease)     Cancer (HCC)     right BKA    Constipation     Constipation     Depression     MAJOR DEPRESSIVE DISORDER    Diabetes mellitus (HCC)     TYPE II    Diabetic neuropathy (HCC)     Hyperlipidemia     Hypertension     Insomnia     Muscle weakness     PVD (peripheral vascular disease) (HCC)     Seizures (HCC)     Vascular dementia ([facility] [address]     Vitamin D deficiency     Weakness          SURGICAL HISTORY       Past Surgical History:   Procedure Laterality Date    AMPUTATION ABOVE KNEE Right 7/19/2019    RIGHT ABOVE KNEE LEG AMPUTATION performed by Mela Dominguez MD at [address] Right 8/13/2019    REVISION AND DEBRIDEMENT OF RIGHT AKA performed by Mela Dominguez MD at [facility]    ARTERIOGRAM Right 6/11/2019    RIGHT ARM AORTO FEMORAL DIGITAL SUBTRACTION ARTERIOGRAM performed by Mela Dominguez MD at [address] [facility] [address] Right 6/11/2019    REVISION AND REVASCULARIZATION RIGHT LOWER EXTREMITY performed by Mela Dominguez MD at [address] Right          CURRENT MEDICATIONS       Previous Medications    APIXABAN (ELIQUIS) 5 MG TABS TABLET    Take 1 tablet by mouth 2 times daily    ASPIRIN 81 MG CHEWABLE TABLET    Take 81 mg by mouth daily    ATORVASTATIN (LIPITOR) 10 MG TABLET    Take 10 mg by mouth nightly     DIVALPROEX (DEPAKOTE ER) 250 MG EXTENDED RELEASE TABLET    Take 250 mg by mouth 3 times daily    DOCUSATE SODIUM (COLACE) 100 MG CAPSULE    Take 100 mg by mouth every morning    DULOXETINE (CYMBALTA) 30 MG EXTENDED RELEASE CAPSULE    Take 1 capsule by mouth daily    FERROUS SULFATE 325 (65 FE) MG TABLET    Take 325 mg by mouth every 12 hours    FOLIC ACID (FOLVITE) 1 MG TABLET    Take 1 mg by mouth daily    FUROSEMIDE (LASIX) 20 MG TABLET    Take 40 mg by mouth daily     IPRATROPIUM-ALBUTEROL (DUONEB) 0.5-2.5 (3) MG/3ML SOLN NEBULIZER SOLUTION    Inhale 1 vial into the lungs every 8 hours as needed for Shortness of Breath (COPD)    ISOSORBIDE MONONITRATE (IMDUR) 30 MG EXTENDED RELEASE TABLET    Take 30 mg by mouth daily    LACTOBACILLUS (ACIDOPHILUS) CAPS CAPSULE    Take 1 capsule by mouth daily    LOSARTAN (COZAAR) 25 MG TABLET    Take 1 tablet by mouth daily    MAGNESIUM HYDROXIDE (MILK OF MAGNESIA) 400 MG/5ML SUSPENSION    Take 30 mLs by mouth daily as needed for Constipation    METOPROLOL SUCCINATE (TOPROL XL) 25 MG EXTENDED RELEASE TABLET    Take 25 mg by mouth daily    PHENYTOIN (DILANTIN) 100 MG ER CAPSULE    Take 100 mg by mouth Daily with lunch     PHENYTOIN (DILANTIN) 100 MG ER CAPSULE    Take 200 mg by mouth 2 times daily MORNING AND AT BEDTIME    POTASSIUM CHLORIDE (MICRO-K) 10 MEQ EXTENDED RELEASE CAPSULE    Take 10 mEq by mouth daily    TRAZODONE (DESYREL) 50 MG TABLET    Take 50 mg by mouth nightly    VITAMIN C (ASCORBIC ACID) 500 MG TABLET    Take 500 mg by mouth daily    VITAMIN D 1000 UNITS CAPS    Take 1 capsule by mouth daily     ZONISAMIDE (ZONEGRAN) 100 MG CAPSULE    Take 100 mg by mouth daily       ALLERGIES     Patient has no known allergies. FAMILY HISTORY     History reviewed. No pertinent family history.        SOCIAL HISTORY       Social History     Socioeconomic History    Marital status: Single     Spouse name: None    Number of children: None    Years of education: None    Highest education level: None   Tobacco Use    Smoking status: Every Day     Packs/day: 0.50     Years: 13.00     Pack years: 6.50     Types: Cigarettes    Smokeless tobacco: Never   Vaping Use    Vaping Use: Never used   Substance and Sexual Activity    Alcohol use: No     Comment: in nursing home for 3 years    Drug use: No    Sexual activity: Not Currently       SCREENINGS        Jose Coma Scale  Eye Opening: Spontaneous  Best Verbal Response: Oriented  Best Motor Response: Obeys commands  [place] Coma Scale Score: 15               PHYSICAL EXAM    (up to 7 for level 4, 8 or more for level 5)     ED Triage Vitals   BP Temp Temp Source Heart Rate Resp SpO2 Height Weight   12/26/22 1632 12/26/22 1622 12/26/22 1622 12/26/22 1622 12/26/22 1622 12/26/22 1622 12/26/22 1622 12/26/22 1622   (!) 144/66 98.9 °F (37.2 °C) Oral 92 18 91 % 5' 2.5\" (1.588 m) 160 lb (72.6 kg)       Physical Exam  Vitals and nursing note reviewed. Constitutional:       General: She is not in acute distress. Appearance: She is well-developed. She is not diaphoretic. HENT:      Head: Normocephalic and atraumatic. Right Ear: External ear normal.      Left Ear: External ear normal.      Nose: Nose normal.      Mouth/Throat:      Mouth: Mucous membranes are moist.      Pharynx: No oropharyngeal exudate. Eyes:      Extraocular Movements: Extraocular movements intact. Conjunctiva/sclera: Conjunctivae normal.      Pupils: Pupils are equal, round, and reactive to light. Neck:      Thyroid: No thyromegaly. Vascular: No JVD. Trachea: No tracheal deviation. Cardiovascular:      Rate and Rhythm: Normal rate. Heart sounds: Normal heart sounds. No murmur heard. Pulmonary:      Effort: Pulmonary effort is normal. No respiratory distress. Breath sounds: Normal breath sounds. No wheezing. Abdominal:      General: Bowel sounds are normal.      Palpations: Abdomen is soft. Tenderness: There is no abdominal tenderness. There is no guarding. Musculoskeletal:         General: Normal range of motion. Cervical back: Normal range of motion and neck supple. Right lower leg: No edema. Left lower leg: No edema. Skin:     General: Skin is warm and dry. Findings: No rash. Neurological:      Mental Status: She is alert and oriented to person, place, and time. Cranial Nerves: No cranial nerve deficit.    Psychiatric: Behavior: Behavior normal.       DIAGNOSTIC RESULTS     EKG: All EKG's are interpreted by the Emergency Department Physician who either signs or Co-signs this chart in the absence of a cardiologist.        RADIOLOGY:   Non-plain film images such as CT, Ultrasound and MRI are read by the radiologist. Plain radiographic images are visualized and preliminarily interpreted by the emergency physician with the below findings:        Interpretation per the Radiologist below, if available at the time of this note:    No orders to display         ED BEDSIDE ULTRASOUND:   Performed by ED Physician - none    LABS:  Labs Reviewed - No data to display    All other labs were within normal range or not returned as of this dictation. EMERGENCY DEPARTMENT COURSE and DIFFERENTIAL DIAGNOSIS/MDM:   Vitals:    Vitals:    12/26/22 1622 12/26/22 1632   BP:  (!) 144/66   Pulse: 92    Resp: 18    Temp: 98.9 °F (37.2 °C)    TempSrc: Oral    SpO2: 91%    Weight: 160 lb (72.6 kg)    Height: 5' 2.5\" (1.588 m)        Patient has history of dementia with agitation was brought here for agitation. She was not given any as needed medications. Patient has not been agitated since the time she arrived. I did medicate her to make sure that she does not become agitated which is what they should do at the nursing home. Patient will be discharged home. Patient has COVID and not only does she not need to be hospitalized she cannot be hospitalized    MDM        [address] time was 0 minutes, excluding separately reportable procedures. There was a high probability of clinically significant/life threatening deterioration in the patient's condition which required my urgent intervention. CONSULTS:  None    PROCEDURES:  Unless otherwise noted below, none     Procedures        FINAL IMPRESSION      1.  Severe dementia with agitation, unspecified dementia type          DISPOSITION/PLAN   DISPOSITION Decision To Discharge 12/26/2022 05:05:36 PM      PATIENT REFERRED TO:  Donald Smith MD  [address], [address] [facility] [address]  [phone]      As needed    DISCHARGE MEDICATIONS:  New Prescriptions    No medications on file     Controlled Substances Monitoring:     RX Monitoring 5/12/2017   Attestation The Prescription Monitoring Report for this patient was reviewed today.        (Please note that portions of this note were completed with a voice recognition program.  Efforts were made to edit the dictations but occasionally words are mis-transcribed.)    Marizol Maldonado DO (electronically signed)  Attending Emergency Physician            Marizol Maldonado DO  12/26/22 1732

## 2022-12-26 NOTE — ED NOTES
Per EMS they state nursing home stated she was pink slipped and they would not take her back. No pink slip was with EMS. Called [facility], spoke to their . State Dr Rhona Castro was pink slipping and would send it, and they wouldn't take her back until she was at [facility]. Informed her [facility] and other [facility] hospitals, including ours, do not take COVID+, which was confimred with [facility] intake. Asked her if they were discharging patient, and she stated no. Infomred her initial disposition was ER doctors responsibility, to whom was going to give her PRNs in ED, and likely DC back.      Yamilet Mask, RN  12/26/22 Jo Ann 1343, RN  12/26/22 9703

## 2022-12-26 NOTE — ED NOTES
Patient is on a 1:1 staffed by this writer since arrival. Remains calm and cooperativ. Loco Wren  12/26/22 7781

## 2022-12-26 NOTE — DISCHARGE INSTRUCTIONS
THIS PATIENT HAS DEMENTIA WITH AGITATION. SHE NEEDS PRN MEDICATION FOR WHEN SHE IS ACTING OUT. SHE HAS COVID AND CANNOT BE ADMITTED ANYWHERE NOR DOES SHE NEED ADMISSION.
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
## 2022-12-26 NOTE — ED NOTES
1;1 was discontinued as patient calm. coopertive and oriented throughout stay. Bryce Gaona  [place], [address] [place] [address]  12/26/22 6924

## 2022-12-26 NOTE — ED NOTES
Patient nurse at nursing home stated she would not take report, that she was told not to take patient back. Infomred her patient has had eval and ED provider determined she did not need IP care, and patient was AAx4 and has remained calm since arrival.     Nurse took nurses# and stated she would have  call back. Did inform her patient was being DC back either way, as she is a resident there. Informed nurse they are required to find a new place for patient if they feel they cant meet her needs.      Ninoska Alvarez, RN  12/26/22 6412

## 2022-12-26 NOTE — ED NOTES
Per Dr Max Andera give medications as ordered. meds given as per [facility]. Patient teaching regaridng medications completed. Pt compliant. Emi Garrett [facility]  12/26/22 6109
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
## 2023-01-19 ENCOUNTER — OFFICE VISIT (OUTPATIENT)
Dept: GERIATRIC MEDICINE | Age: 63
End: 2023-01-19

## 2023-01-19 DIAGNOSIS — F33.9 CHRONIC MAJOR DEPRESSIVE DISORDER, RECURRENT EPISODE (HCC): Primary | ICD-10-CM

## 2023-01-21 NOTE — PROGRESS NOTES
[facility] FOLLOW-UP NOTE       1/21/2023     Patient was seen and examined in person, Chart reviewed   Patient's case discussed with staff/team    Chief Complaint: Depression    Interim History:   Remain somatically focused with multiple physical complaints  Pt report feeling better with mood  Less anxious  No active SI or HI  Sleep and appetite better  No hopeless and worthless feeling    Appetite:   [x] Normal/Unchanged  [] Increased  [] Decreased      Sleep:       [] Normal/Unchanged  [x] Fair       [] Poor              Energy:    [x] Normal/Unchanged  [] Increased  [] Decreased        SI [] Present  [x] Absent    HI  []Present  [x] Absent     Aggression:  [] yes  [x] no    Patient is [x] able  [] unable to CONTRACT FOR SAFETY     PAST MEDICAL/PSYCHIATRIC HISTORY:   Past Medical History:   Diagnosis Date    Altered mental status, unspecified     Aphasia     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     Athscl heart disease of native coronary artery w/o ang pctrs     CAD (coronary artery disease)     Cancer ([facility])     right BKA    Constipation     Constipation     Depression     MAJOR DEPRESSIVE DISORDER    Diabetes mellitus ([facility]     TYPE II    Diabetic neuropathy ([facility])     Hyperlipidemia     Hypertension     Insomnia     Muscle weakness     PVD (peripheral vascular disease) ([facility])     Seizures ([facility]     Vascular dementia ([facility]     Vitamin D deficiency     Weakness        FAMILY/SOCIAL HISTORY:  No family history on file.   Social History     Socioeconomic History    Marital status: Single     Spouse name: Not on file    Number of children: Not on file    Years of education: Not on file    Highest education level: Not on file   Occupational History    Not on file   Tobacco Use    Smoking status: Every Day     Packs/day: 0.50     Years: 13.00     Pack years: 6.50     Types: Cigarettes    Smokeless tobacco: Never   Vaping Use    Vaping Use: Never used   Substance and Sexual Activity    Alcohol use: No     Comment: in nursing home for 3 years    Drug use: No    Sexual activity: Not Currently   Other Topics Concern    Not on file   Social History Narrative    Not on file     Social Determinants of Health     Financial Resource Strain: Not on file   Food Insecurity: Not on file   Transportation Needs: Not on file   Physical Activity: Not on file   Stress: Not on file   Social Connections: Not on file   Intimate Partner Violence: Not on file   Housing Stability: Not on file           ROS:  [x] All negative/unchanged except if checked. Explain positive(checked items) below:  [] Constitutional  [] Eyes  [] Ear/Nose/Mouth/Throat  [] Respiratory  [] CV  [] GI  []   [] Musculoskeletal  [] Skin/Breast  [] Neurological  [] Endocrine  [] Heme/Lymph  [] Allergic/Immunologic    Explanation:     MEDICATIONS:    Current Outpatient Medications:     ferrous sulfate 325 (65 Fe) MG tablet, Take 325 mg by mouth every 12 hours, Disp: , Rfl:     folic acid (FOLVITE) 1 MG tablet, Take 1 mg by mouth daily, Disp: , Rfl:     divalproex (DEPAKOTE ER) 250 MG extended release tablet, Take 250 mg by mouth 3 times daily, Disp: , Rfl:     apixaban (ELIQUIS) 5 MG TABS tablet, Take 1 tablet by mouth 2 times daily, Disp: 60 tablet, Rfl: 3    losartan (COZAAR) 25 MG tablet, Take 1 tablet by mouth daily, Disp: 30 tablet, Rfl: 3    Lactobacillus (ACIDOPHILUS) CAPS capsule, Take 1 capsule by mouth daily, Disp: , Rfl:     vitamin C (ASCORBIC ACID) 500 MG tablet, Take 500 mg by mouth daily, Disp: , Rfl:     potassium chloride (MICRO-K) 10 MEQ extended release capsule, Take 10 mEq by mouth daily, Disp: , Rfl:     ipratropium-albuterol (DUONEB) 0.5-2.5 (3) MG/3ML SOLN nebulizer solution, Inhale 1 vial into the lungs every 8 hours as needed for Shortness of Breath (COPD), Disp: , Rfl:     DULoxetine (CYMBALTA) 30 MG extended release capsule, Take 1 capsule by mouth daily, Disp: 30 capsule, Rfl: 3    phenytoin (DILANTIN)  100 MG ER capsule, Take 200 mg by mouth 2 times daily MORNING AND AT BEDTIME, Disp: , Rfl:     traZODone (DESYREL) 50 MG tablet, Take 50 mg by mouth nightly, Disp: , Rfl:     docusate sodium (COLACE) 100 MG capsule, Take 100 mg by mouth every morning, Disp: , Rfl:     aspirin 81 MG chewable tablet, Take 81 mg by mouth daily, Disp: , Rfl:     atorvastatin (LIPITOR) 10 MG tablet, Take 10 mg by mouth nightly , Disp: , Rfl:     vitamin D 1000 UNITS CAPS, Take 1 capsule by mouth daily , Disp: , Rfl:     isosorbide mononitrate (IMDUR) 30 MG extended release tablet, Take 30 mg by mouth daily, Disp: , Rfl:     furosemide (LASIX) 20 MG tablet, Take 40 mg by mouth daily , Disp: , Rfl:     magnesium hydroxide (MILK OF MAGNESIA) 400 MG/5ML suspension, Take 30 mLs by mouth daily as needed for Constipation, Disp: , Rfl:     phenytoin (DILANTIN) 100 MG ER capsule, Take 100 mg by mouth Daily with lunch , Disp: , Rfl:     metoprolol succinate (TOPROL XL) 25 MG extended release tablet, Take 25 mg by mouth daily, Disp: , Rfl:     zonisamide (ZONEGRAN) 100 MG capsule, Take 100 mg by mouth daily, Disp: , Rfl:   MEDICATION SIDE EFFECTS: no      Examination:  There were no vitals taken for this visit. Gait - in wheel chair      Mental Status Examination:    Level of consciousness:  within normal limits   Appearance:  fair grooming and fair hygiene  Behavior/Motor:  psychomotor retardation  Attitude toward examiner:  cooperative  Speech:  normal rate   Mood: anxious  Affect:  mood congruent  Thought processes:  coherent   Thought content:  Suicidal Ideation:  denies suicidal ideation  Cognition:  oriented to person, place, and time   Concentration intact  Insight fair   Judgement fair     ASSESSMENT:   Patient symptoms are:  [] Well controlled  [x] Improving  [] Worsening  [] No change      Diagnosis:   MDD recurrent chronic  Vascular dementia with BD  CEM    LABS:  No visits with results within 2 Day(s) from this visit.    Latest known visit with results is:   Admission on 07/13/2022, Discharged on 07/14/2022   Component Date Value Ref Range Status    Sodium 07/13/2022 140  135 - 144 mEq/L Final    Potassium 07/13/2022 4.1  3.4 - 4.9 mEq/L Final    Chloride 07/13/2022 106  95 - 107 mEq/L Final    CO2 07/13/2022 23  20 - 31 mEq/L Final    Anion Gap 07/13/2022 11  9 - 15 mEq/L Final    Glucose 07/13/2022 89  70 - 99 mg/dL Final    BUN 07/13/2022 14  8 - 23 mg/dL Final    Creatinine 07/13/2022 0.56  0.50 - 0.90 mg/dL Final    GFR Non- 07/13/2022 >60.0  >60 Final    Comment: >60 mL/min/1.73m2 EGFR, calc. for ages 25 and older using the  MDRD formula (not corrected for weight), is valid for stable  renal function. GFR  07/13/2022 >60.0  >60 Final    Comment: >60 mL/min/1.73m2 EGFR, calc. for ages 25 and older using the  MDRD formula (not corrected for weight), is valid for stable  renal function.       Calcium 07/13/2022 9.3  8.5 - 9.9 mg/dL Final    Total Protein 07/13/2022 6.6  6.3 - 8.0 g/dL Final    Albumin 07/13/2022 4.1  3.5 - 4.6 g/dL Final    Total Bilirubin 07/13/2022 <0.2  0.2 - 0.7 mg/dL Final    Alkaline Phosphatase 07/13/2022 113  40 - 130 U/L Final    ALT 07/13/2022 9  0 - 33 U/L Final    AST 07/13/2022 12  0 - 35 U/L Final    Globulin 07/13/2022 2.5  2.3 - 3.5 g/dL Final    Magnesium 07/13/2022 2.2  1.7 - 2.4 mg/dL Final    WBC 07/13/2022 9.6  4.8 - 10.8 K/uL Final    RBC 07/13/2022 4.66  4.20 - 5.40 M/uL Final    Hemoglobin 07/13/2022 15.6  12.0 - 16.0 g/dL Final    Hematocrit 07/13/2022 47.1 (A)  37.0 - 47.0 % Final    MCV 07/13/2022 101.0 (A)  82.0 - 100.0 fL Final    MCH 07/13/2022 33.5 (A)  27.0 - 31.3 pg Final    MCHC 07/13/2022 33.2  33.0 - 37.0 % Final    RDW 07/13/2022 13.6  11.5 - 14.5 % Final    Platelets 07/88/5991 173  130 - 400 K/uL Final    Neutrophils % 07/13/2022 56.8  % Final    Lymphocytes % 07/13/2022 31.7  % Final    Monocytes % 07/13/2022 7.3  % Final    Eosinophils % 07/13/2022 3.5  % Final    Basophils % 07/13/2022 0.7  % Final    Neutrophils Absolute 07/13/2022 5.5  1.4 - 6.5 K/uL Final    Lymphocytes Absolute 07/13/2022 3.0  1.0 - 4.8 K/uL Final    Monocytes Absolute 07/13/2022 0.7  0.2 - 0.8 K/uL Final    Eosinophils Absolute 07/13/2022 0.3  0.0 - 0.7 K/uL Final    Basophils Absolute 07/13/2022 0.1  0.0 - 0.2 K/uL Final    Total CK 07/13/2022 65  0 - 170 U/L Final    TSH 07/13/2022 1.490  0.440 - 3.860 uIU/mL Final    Ethanol Lvl 07/13/2022 <10  mg/dL Final    Ethanol percent 07/13/2022 Not indicated  G/dL Final    Color, UA 07/13/2022 Yellow  Straw/Yellow Final    Clarity, UA 07/13/2022 Clear  Clear Final    Glucose, Ur 07/13/2022 Negative  Negative mg/dL Final    Bilirubin Urine 07/13/2022 Negative  Negative Final    Ketones, Urine 07/13/2022 Negative  Negative mg/dL Final    Specific Gravity, UA 07/13/2022 1.012  1.005 - 1.030 Final    Blood, Urine 07/13/2022 Negative  Negative Final    pH, UA 07/13/2022 7.5  5.0 - 9.0 Final    Protein, UA 07/13/2022 Negative  Negative mg/dL Final    Urobilinogen, Urine 07/13/2022 0.2  <2.0 E.U./dL Final    Nitrite, Urine 07/13/2022 Negative  Negative Final    Leukocyte Esterase, Urine 07/13/2022 Negative  Negative Final    Urine Reflex to Culture 07/13/2022 Not Indicated   Final    Preg Test, Ur 07/13/2022 negative   Final    QC OK? 07/13/2022 ok   Final    Amphetamine Screen, Urine 07/13/2022 Neg  Negative <1000 ng/mL Final    Barbiturate Screen, Ur 07/13/2022 Neg  Negative < 200 ng/mL Final    Benzodiazepine Screen, Urine 07/13/2022 Neg  Negative < 200 ng/mL Final    Cannabinoid Scrn, Ur 07/13/2022 Neg  Negative < 50 ng/mL Final    Cocaine Metabolite Screen, Urine 07/13/2022 Neg  Negative < 300 ng/mL Final    Opiate Scrn, Ur 07/13/2022 Neg  Negative < 300 ng/mL Final    PCP Screen, Urine 07/13/2022 Neg  Negative < 25 ng/mL Final    Methadone Screen, Urine 07/13/2022 Neg  Negative <300 ng/mL Final    Propoxyphene Scrn, Ur 07/13/2022 Neg  Negative <300 ng/mL Final    Oxycodone Urine 07/13/2022 Neg  Negative <100 ng/mL Final    FENTANYL SCREEN, URINE 07/13/2022 Neg  Negative < 50 ng/mL Final    Drug Screen Comment: 07/13/2022 see below   Final    Comment: This method is a screening test to detect only these drug  classes as part of a medical workup. Confirmatory testing  by another method should be ordered if clinically indicated. SARS-CoV-2, NAAT 07/13/2022 Not Detected  Not Detected Final    Comment: Rapid NAAT:   Negative results should be treated as presumptive and,  if inconsistent with clinical signs and symptoms or necessary for  patient management, should be tested with an alternative molecular  assay. Negative results do not preclude SARS-CoV-2 infection and  should not be used as the sole basis for patient management decisions. This test has been authorized by the FDA under an Emergency Use  Authorization (EUA) for use by authorized laboratories. Fact sheet for Healthcare Providers:  BuildHer.es  Fact sheet for Patients: sevenload.es    METHODOLOGY: Isothermal Nucleic Acid Amplification      Ventricular Rate 07/13/2022 61  BPM Final    Atrial Rate 07/13/2022 61  BPM Final    P-R Interval 07/13/2022 190  ms Final    QRS Duration 07/13/2022 164  ms Final    Q-T Interval 07/13/2022 468  ms Final    QTc Calculation (Bazett) 07/13/2022 471  ms Final    P Axis 07/13/2022 74  degrees Final    R Axis 07/13/2022 -48  degrees Final    T Axis 07/13/2022 102  degrees Final    Cholesterol, Total 07/13/2022 172  0 - 199 mg/dL Final    ATP III Cholesterol classification is Desirable. Triglycerides 07/13/2022 95  0 - 150 mg/dL Final    ATP III Triglycerides Classification is Normal.    HDL 07/13/2022 77 (A)  40 - 59 mg/dL Final    Comment: ATP III HDL Cholesterol Classification is high.   Expected Values:    Males:    >55 = No Risk            35-55 = Moderate Risk            <35 = High Risk    Females:  >65 = No Risk            45-65 = Moderate Risk            <45 = High Risk    NCEP Guidelines: Third Report May 2001  >59 = negative risk factor for CHD  <40 = major risk factor for CHD      LDL Calculated 07/13/2022 76  0 - 129 mg/dL Final    ATP III LDL Classification is Optimal.    Salicylate, Serum 87/47/0644 <0.3 (A)  15.0 - 30.0 mg/dL Final    Comment: Anti-pyretic:  3.0-10.0 mg/dL  Anti-inflammatory:  15.0-30.0 mg/dL  Toxic: >30.0 mg/dL      Acetaminophen Level 07/13/2022 <5 (A)  10 - 30 ug/mL Final         Treatment Plan:  Reviewed current Medications with the patient. Continue current medication ordered  Risks, benefits, side effects, drug-to-drug interactions and alternatives to treatment were discussed. Discussed with the nurse about the treatment plan.   Future labs   Call me if needed   Follow up in 3 months      GRADUAL DOSE REDUCTION ATTEMPTED : [] YES    [x] NO      REASON FOR NOT REDUCING MEDICATION DOSE:   [] NA  [x] HIGH RISK OF PATIENT DETERIORATION  [] MEDICATION RECENTLY REDUCED  [] PRIOR MEDICATION DOSE REDUCTION UNSUCCESSFUL  [] Other            Electronically signed by Riley Zuñiga MD on 1/21/2023 at 10:53 AM

## 2023-06-25 ENCOUNTER — HOSPITAL ENCOUNTER (EMERGENCY)
Age: 63
Discharge: OTHER INSTITUTION | End: 2023-06-25
Payer: MEDICAID

## 2023-06-25 VITALS
HEIGHT: 62 IN | SYSTOLIC BLOOD PRESSURE: 123 MMHG | HEART RATE: 72 BPM | TEMPERATURE: 98.2 F | OXYGEN SATURATION: 95 % | BODY MASS INDEX: 27.6 KG/M2 | DIASTOLIC BLOOD PRESSURE: 65 MMHG | RESPIRATION RATE: 18 BRPM | WEIGHT: 150 LBS

## 2023-06-25 DIAGNOSIS — F32.A DEPRESSION, UNSPECIFIED DEPRESSION TYPE: Primary | ICD-10-CM

## 2023-06-25 LAB
ALBUMIN SERPL-MCNC: 3.9 G/DL (ref 3.5–4.6)
ALP SERPL-CCNC: 107 U/L (ref 40–130)
ALT SERPL-CCNC: 12 U/L (ref 0–33)
AMPHET UR QL SCN: NORMAL
ANION GAP SERPL CALCULATED.3IONS-SCNC: 12 MEQ/L (ref 9–15)
APAP SERPL-MCNC: <5 UG/ML (ref 10–30)
AST SERPL-CCNC: 14 U/L (ref 0–35)
BARBITURATES UR QL SCN: NORMAL
BASOPHILS # BLD: 0.1 K/UL (ref 0–0.2)
BASOPHILS NFR BLD: 1 %
BENZODIAZ UR QL SCN: NORMAL
BILIRUB SERPL-MCNC: <0.2 MG/DL (ref 0.2–0.7)
BILIRUB UR QL STRIP: NEGATIVE
BUN SERPL-MCNC: 13 MG/DL (ref 8–23)
CALCIUM SERPL-MCNC: 9.2 MG/DL (ref 8.5–9.9)
CANNABINOIDS UR QL SCN: NORMAL
CHLORIDE SERPL-SCNC: 106 MEQ/L (ref 95–107)
CHOLEST SERPL-MCNC: 154 MG/DL (ref 0–199)
CK SERPL-CCNC: 66 U/L (ref 0–170)
CLARITY UR: CLEAR
CO2 SERPL-SCNC: 21 MEQ/L (ref 20–31)
COCAINE UR QL SCN: NORMAL
COLOR UR: YELLOW
CREAT SERPL-MCNC: 0.6 MG/DL (ref 0.5–0.9)
DRUG SCREEN COMMENT UR-IMP: NORMAL
EOSINOPHIL # BLD: 0.2 K/UL (ref 0–0.7)
EOSINOPHIL NFR BLD: 3.1 %
ERYTHROCYTE [DISTWIDTH] IN BLOOD BY AUTOMATED COUNT: 13.1 % (ref 11.5–14.5)
ETHANOL PERCENT: NORMAL G/DL
ETHANOLAMINE SERPL-MCNC: <10 MG/DL (ref 0–0.08)
FENTANYL SCREEN, URINE: NORMAL
GLOBULIN SER CALC-MCNC: 2.8 G/DL (ref 2.3–3.5)
GLUCOSE SERPL-MCNC: 81 MG/DL (ref 70–99)
GLUCOSE UR STRIP-MCNC: NEGATIVE MG/DL
HCG UR QL: NEGATIVE
HCT VFR BLD AUTO: 46.8 % (ref 37–47)
HDLC SERPL-MCNC: 71 MG/DL (ref 40–59)
HGB BLD-MCNC: 15.6 G/DL (ref 12–16)
HGB UR QL STRIP: NEGATIVE
KETONES UR STRIP-MCNC: ABNORMAL MG/DL
LDLC SERPL CALC-MCNC: 63 MG/DL (ref 0–129)
LEUKOCYTE ESTERASE UR QL STRIP: NEGATIVE
LYMPHOCYTES # BLD: 2 K/UL (ref 1–4.8)
LYMPHOCYTES NFR BLD: 29.2 %
MCH RBC QN AUTO: 33.5 PG (ref 27–31.3)
MCHC RBC AUTO-ENTMCNC: 33.4 % (ref 33–37)
MCV RBC AUTO: 100.4 FL (ref 79.4–94.8)
METHADONE UR QL SCN: NORMAL
MONOCYTES # BLD: 0.4 K/UL (ref 0.2–0.8)
MONOCYTES NFR BLD: 6.3 %
NEUTROPHILS # BLD: 4.1 K/UL (ref 1.4–6.5)
NEUTS SEG NFR BLD: 60.4 %
NITRITE UR QL STRIP: NEGATIVE
OPIATES UR QL SCN: NORMAL
OXYCODONE UR QL SCN: NORMAL
PCP UR QL SCN: NORMAL
PH UR STRIP: 7 [PH] (ref 5–9)
PLATELET # BLD AUTO: 182 K/UL (ref 130–400)
POTASSIUM SERPL-SCNC: 4.5 MEQ/L (ref 3.4–4.9)
PROPOXYPH UR QL SCN: NORMAL
PROT SERPL-MCNC: 6.7 G/DL (ref 6.3–8)
PROT UR STRIP-MCNC: NEGATIVE MG/DL
RBC # BLD AUTO: 4.66 M/UL (ref 4.2–5.4)
SALICYLATES SERPL-MCNC: <0.3 MG/DL (ref 15–30)
SODIUM SERPL-SCNC: 139 MEQ/L (ref 135–144)
SP GR UR STRIP: 1.02 (ref 1–1.03)
TRIGL SERPL-MCNC: 101 MG/DL (ref 0–150)
TSH SERPL-MCNC: 0.56 UIU/ML (ref 0.44–3.86)
URINE REFLEX TO CULTURE: ABNORMAL
UROBILINOGEN UR STRIP-ACNC: 0.2 E.U./DL
WBC # BLD AUTO: 6.8 K/UL (ref 4.8–10.8)

## 2023-06-25 PROCEDURE — 80143 DRUG ASSAY ACETAMINOPHEN: CPT

## 2023-06-25 PROCEDURE — 80179 DRUG ASSAY SALICYLATE: CPT

## 2023-06-25 PROCEDURE — 81003 URINALYSIS AUTO W/O SCOPE: CPT

## 2023-06-25 PROCEDURE — 80307 DRUG TEST PRSMV CHEM ANLYZR: CPT

## 2023-06-25 PROCEDURE — 84443 ASSAY THYROID STIM HORMONE: CPT

## 2023-06-25 PROCEDURE — 99283 EMERGENCY DEPT VISIT LOW MDM: CPT

## 2023-06-25 PROCEDURE — 80053 COMPREHEN METABOLIC PANEL: CPT

## 2023-06-25 PROCEDURE — 82550 ASSAY OF CK (CPK): CPT

## 2023-06-25 PROCEDURE — 82077 ASSAY SPEC XCP UR&BREATH IA: CPT

## 2023-06-25 PROCEDURE — 36415 COLL VENOUS BLD VENIPUNCTURE: CPT

## 2023-06-25 PROCEDURE — 84703 CHORIONIC GONADOTROPIN ASSAY: CPT

## 2023-06-25 PROCEDURE — 80061 LIPID PANEL: CPT

## 2023-06-25 PROCEDURE — 85025 COMPLETE CBC W/AUTO DIFF WBC: CPT

## 2023-06-25 ASSESSMENT — PAIN - FUNCTIONAL ASSESSMENT: PAIN_FUNCTIONAL_ASSESSMENT: NONE - DENIES PAIN

## 2023-06-25 ASSESSMENT — LIFESTYLE VARIABLES
HOW OFTEN DO YOU HAVE A DRINK CONTAINING ALCOHOL: MONTHLY OR LESS
HOW MANY STANDARD DRINKS CONTAINING ALCOHOL DO YOU HAVE ON A TYPICAL DAY: 1 OR 2
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
## 2024-05-18 ENCOUNTER — APPOINTMENT (OUTPATIENT)
Dept: CT IMAGING | Age: 64
End: 2024-05-18
Payer: MEDICAID

## 2024-05-18 ENCOUNTER — HOSPITAL ENCOUNTER (EMERGENCY)
Age: 64
Discharge: HOME | End: 2024-05-18
Attending: EMERGENCY MEDICINE
Payer: MEDICAID

## 2024-05-18 VITALS
WEIGHT: 150 LBS | TEMPERATURE: 98.7 F | RESPIRATION RATE: 16 BRPM | OXYGEN SATURATION: 94 % | HEART RATE: 67 BPM | SYSTOLIC BLOOD PRESSURE: 99 MMHG | DIASTOLIC BLOOD PRESSURE: 68 MMHG | HEIGHT: 62 IN | BODY MASS INDEX: 27.6 KG/M2

## 2024-05-18 DIAGNOSIS — R46.89 ABNORMAL BEHAVIOR: Primary | ICD-10-CM

## 2024-05-18 LAB
ALBUMIN SERPL-MCNC: 3.6 G/DL (ref 3.5–4.6)
ALP SERPL-CCNC: 102 U/L (ref 40–130)
ALT SERPL-CCNC: 9 U/L (ref 0–33)
AMPHET UR QL SCN: NORMAL
ANION GAP SERPL CALCULATED.3IONS-SCNC: 11 MEQ/L (ref 9–15)
APAP SERPL-MCNC: <5 UG/ML (ref 10–30)
AST SERPL-CCNC: 12 U/L (ref 0–35)
BARBITURATES UR QL SCN: NORMAL
BASOPHILS # BLD: 0.1 K/UL (ref 0–0.2)
BASOPHILS NFR BLD: 1 %
BENZODIAZ UR QL SCN: NORMAL
BILIRUB SERPL-MCNC: <0.2 MG/DL (ref 0.2–0.7)
BILIRUB UR QL STRIP: NEGATIVE
BUN SERPL-MCNC: 14 MG/DL (ref 8–23)
CALCIUM SERPL-MCNC: 9.4 MG/DL (ref 8.5–9.9)
CANNABINOIDS UR QL SCN: NORMAL
CHLORIDE SERPL-SCNC: 107 MEQ/L (ref 95–107)
CK SERPL-CCNC: 60 U/L (ref 0–170)
CLARITY UR: CLEAR
CO2 SERPL-SCNC: 23 MEQ/L (ref 20–31)
COCAINE UR QL SCN: NORMAL
COLOR UR: YELLOW
CREAT SERPL-MCNC: 0.54 MG/DL (ref 0.5–0.9)
DRUG SCREEN COMMENT UR-IMP: NORMAL
EOSINOPHIL # BLD: 0.3 K/UL (ref 0–0.7)
EOSINOPHIL NFR BLD: 3.7 %
ERYTHROCYTE [DISTWIDTH] IN BLOOD BY AUTOMATED COUNT: 13.1 % (ref 11.5–14.5)
ETHANOL PERCENT: NORMAL G/DL
ETHANOLAMINE SERPL-MCNC: <10 MG/DL (ref 0–0.08)
FENTANYL SCREEN, URINE: NORMAL
GLOBULIN SER CALC-MCNC: 2.9 G/DL (ref 2.3–3.5)
GLUCOSE SERPL-MCNC: 128 MG/DL (ref 70–99)
GLUCOSE UR STRIP-MCNC: NEGATIVE MG/DL
HCT VFR BLD AUTO: 46.1 % (ref 37–47)
HGB BLD-MCNC: 15.6 G/DL (ref 12–16)
HGB UR QL STRIP: NEGATIVE
KETONES UR STRIP-MCNC: NEGATIVE MG/DL
LACTATE BLDV-SCNC: 1.4 MMOL/L (ref 0.5–2.2)
LEUKOCYTE ESTERASE UR QL STRIP: NEGATIVE
LYMPHOCYTES # BLD: 2.3 K/UL (ref 1–4.8)
LYMPHOCYTES NFR BLD: 32.2 %
MCH RBC QN AUTO: 33.7 PG (ref 27–31.3)
MCHC RBC AUTO-ENTMCNC: 33.8 % (ref 33–37)
MCV RBC AUTO: 99.6 FL (ref 79.4–94.8)
METHADONE UR QL SCN: NORMAL
MONOCYTES # BLD: 0.5 K/UL (ref 0.2–0.8)
MONOCYTES NFR BLD: 6.5 %
NEUTROPHILS # BLD: 4 K/UL (ref 1.4–6.5)
NEUTS SEG NFR BLD: 56.3 %
NITRITE UR QL STRIP: NEGATIVE
OPIATES UR QL SCN: NORMAL
OXYCODONE UR QL SCN: NORMAL
PCP UR QL SCN: NORMAL
PH UR STRIP: 6.5 [PH] (ref 5–9)
PHENYTOIN SERPL-MCNC: 5.5 UG/ML (ref 10–20)
PLATELET # BLD AUTO: 162 K/UL (ref 130–400)
POTASSIUM SERPL-SCNC: 4.8 MEQ/L (ref 3.4–4.9)
PROPOXYPH UR QL SCN: NORMAL
PROT SERPL-MCNC: 6.5 G/DL (ref 6.3–8)
PROT UR STRIP-MCNC: NEGATIVE MG/DL
RBC # BLD AUTO: 4.63 M/UL (ref 4.2–5.4)
SALICYLATES SERPL-MCNC: <0.3 MG/DL (ref 15–30)
SODIUM SERPL-SCNC: 141 MEQ/L (ref 135–144)
SP GR UR STRIP: 1.01 (ref 1–1.03)
TSH SERPL-MCNC: 0.57 UIU/ML (ref 0.44–3.86)
UROBILINOGEN UR STRIP-ACNC: 0.2 E.U./DL
WBC # BLD AUTO: 7 K/UL (ref 4.8–10.8)

## 2024-05-18 PROCEDURE — 85025 COMPLETE CBC W/AUTO DIFF WBC: CPT

## 2024-05-18 PROCEDURE — 83605 ASSAY OF LACTIC ACID: CPT

## 2024-05-18 PROCEDURE — 80179 DRUG ASSAY SALICYLATE: CPT

## 2024-05-18 PROCEDURE — 82077 ASSAY SPEC XCP UR&BREATH IA: CPT

## 2024-05-18 PROCEDURE — 99283 EMERGENCY DEPT VISIT LOW MDM: CPT

## 2024-05-18 PROCEDURE — 81003 URINALYSIS AUTO W/O SCOPE: CPT

## 2024-05-18 PROCEDURE — 80164 ASSAY DIPROPYLACETIC ACD TOT: CPT

## 2024-05-18 PROCEDURE — 36415 COLL VENOUS BLD VENIPUNCTURE: CPT

## 2024-05-18 PROCEDURE — 80143 DRUG ASSAY ACETAMINOPHEN: CPT

## 2024-05-18 PROCEDURE — 82550 ASSAY OF CK (CPK): CPT

## 2024-05-18 PROCEDURE — 84443 ASSAY THYROID STIM HORMONE: CPT

## 2024-05-18 PROCEDURE — 80185 ASSAY OF PHENYTOIN TOTAL: CPT

## 2024-05-18 PROCEDURE — 80053 COMPREHEN METABOLIC PANEL: CPT

## 2024-05-18 PROCEDURE — 80307 DRUG TEST PRSMV CHEM ANLYZR: CPT

## 2024-05-18 ASSESSMENT — PAIN - FUNCTIONAL ASSESSMENT
PAIN_FUNCTIONAL_ASSESSMENT: NONE - DENIES PAIN
PAIN_FUNCTIONAL_ASSESSMENT: NONE - DENIES PAIN

## 2024-05-18 ASSESSMENT — ENCOUNTER SYMPTOMS
VOMITING: 0
WHEEZING: 0
SHORTNESS OF BREATH: 0
TROUBLE SWALLOWING: 0
ABDOMINAL PAIN: 0
RHINORRHEA: 0
ALLERGIC/IMMUNOLOGIC NEGATIVE: 1
EYES NEGATIVE: 1

## 2024-05-18 ASSESSMENT — LIFESTYLE VARIABLES
HOW MANY STANDARD DRINKS CONTAINING ALCOHOL DO YOU HAVE ON A TYPICAL DAY: PATIENT DOES NOT DRINK
HOW OFTEN DO YOU HAVE A DRINK CONTAINING ALCOHOL: NEVER

## 2024-05-18 NOTE — ED NOTES
Report called to Gilda guerrero at this time and given to receiving nurse  Pt is calm and cooperative with staff at ER
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
## 2024-05-18 NOTE — ED NOTES
Call placed to Dr. Prabhakar, discussed events leading to admit and current assessment. Patient denies SI, denies HI and denies A/V hallucinations. Received order to discharge back to facility. Disposition given to ED provider.

## 2024-05-21 LAB
VALPROATE FREE MFR SERPL: ABNORMAL % (ref 5–18)
VALPROATE FREE SERPL-MCNC: <7 UG/ML (ref 7–23)
VALPROATE SERPL-MCNC: <7 UG/ML (ref 50–125)

## 2024-10-29 NOTE — PROGRESS NOTES
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 Subjectively, no new complaints at this time. All 14 review of systems discussed and negative other than as stated as above. General: Patient in no acute distress, cooperative  Skin: no new rashes   HEENT:  EOMI, MMM, Neck is supple  Heart: S1 S2  Lungs: clear bilaterally   Abdomen: soft, ND, +BS, NTTP  Extrem:stump wound much better overall. - still open. No vac but red beefy tissue noted. Large deep wound. Neuro exam: CN II-XII intact    Imaging and labs were reviewed per medical records and any ID pertinent labs were addressed with the patient. 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 Follow up with ID as needed.      Time spent with patient: 25 min  >50% of time spent counseling on patient care and coordination of care      Martha Rick DO October

## 2025-03-08 ENCOUNTER — HOSPITAL ENCOUNTER (EMERGENCY)
Age: 65
Discharge: HOME | End: 2025-03-08
Attending: EMERGENCY MEDICINE
Payer: MEDICAID

## 2025-03-08 VITALS
OXYGEN SATURATION: 96 % | DIASTOLIC BLOOD PRESSURE: 56 MMHG | HEIGHT: 62 IN | SYSTOLIC BLOOD PRESSURE: 147 MMHG | BODY MASS INDEX: 28.84 KG/M2 | RESPIRATION RATE: 16 BRPM | TEMPERATURE: 97.9 F | HEART RATE: 68 BPM | WEIGHT: 156.7 LBS

## 2025-03-08 DIAGNOSIS — Z76.89 ENCOUNTER FOR PSYCHIATRIC ASSESSMENT: Primary | ICD-10-CM

## 2025-03-08 PROCEDURE — 99283 EMERGENCY DEPT VISIT LOW MDM: CPT

## 2025-03-08 ASSESSMENT — ENCOUNTER SYMPTOMS
SINUS PAIN: 0
EYE REDNESS: 0
COUGH: 0
NAUSEA: 0
VOMITING: 0
BACK PAIN: 0
SHORTNESS OF BREATH: 0
EYE PAIN: 0
ABDOMINAL PAIN: 0

## 2025-03-08 ASSESSMENT — PAIN - FUNCTIONAL ASSESSMENT: PAIN_FUNCTIONAL_ASSESSMENT: NONE - DENIES PAIN
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
## (undated) DEVICE — Z DISCONTINUED APPLICATOR SURG PREP 0.35OZ 2% CHG 70% ISO ALC W/ HI LT

## (undated) DEVICE — LABEL MED MINI W/ MARKER

## (undated) DEVICE — SUTURE PERMA-HAND SZ 2 L60IN NONABSORBABLE BLK SILK BRAID SA8H

## (undated) DEVICE — MARKER SURG SKIN GENTIAN VLT REG TIP W/ 6IN RUL

## (undated) DEVICE — DRESSING,GAUZE,XEROFORM,CURAD,5"X9",ST: Brand: CURAD

## (undated) DEVICE — SET BLD COLL 21GA TB L12IN NDL L0.75IN WNG GRN SIMP EZ TO

## (undated) DEVICE — GAUZE,PACKING STRIP,PLAIN,1"X5YD,STRL,LF: Brand: CURAD

## (undated) DEVICE — INTENDED FOR TISSUE SEPARATION, AND OTHER PROCEDURES THAT REQUIRE A SHARP SURGICAL BLADE TO PUNCTURE OR CUT.: Brand: BARD-PARKER ®  SAFETY SCALPED

## (undated) DEVICE — TUBING, SUCTION, 1/4" X 10', STRAIGHT: Brand: MEDLINE

## (undated) DEVICE — SUTURE NABSORBABLE PRONOVA L24IN SZ 5-0 BLU C-1 L13MM 3/8 CIR REV PN3725H

## (undated) DEVICE — ELECTROSURGICAL PENCIL BUTTON SWITCH E-Z CLEAN COATED BLADE ELECTRODE 10 FT (3 M) CORD HOLSTER: Brand: MEGADYNE

## (undated) DEVICE — Device: Brand: MEDEX

## (undated) DEVICE — COUNTER NDL 40 COUNT HLD 70 FOAM BLK ADH W/ MAG

## (undated) DEVICE — 3M™ STERI-DRAPE™ INCISE DRAPE 1050 (60CM X 45CM): Brand: STERI-DRAPE™

## (undated) DEVICE — GLOW 'N TELL 30CM TAPE (50 STRIPS): Brand: VASCUTAPE RADIOPAQUE TAPE

## (undated) DEVICE — SYRINGE MED 10ML LUERLOCK TIP W/O SFTY DISP

## (undated) DEVICE — BANDAGE,GAUZE,BULKEE II,4.5"X4.1YD,STRL: Brand: MEDLINE

## (undated) DEVICE — INTENDED TO BE USED TO OCCLUDE, RETRACT AND IDENTIFY ARTERIES, VEINS, TENDONS AND NERVES IN SURGICAL PROCEDURES: Brand: STERION®  VESSEL LOOP

## (undated) DEVICE — SUTURE PROL SZ 2-0 L18IN NONABSORBABLE BLU FS L26MM 3/8 CIR 8685H

## (undated) DEVICE — PACK,LAPAROTOMY,NO GOWNS: Brand: MEDLINE

## (undated) DEVICE — ELECTRODE PT RET AD L9FT HI MOIST COND ADH HYDRGEL CORDED

## (undated) DEVICE — TRAY PREP DRY W/ PREM GLV 2 APPL 6 SPNG 2 UNDPD 1 OVERWRAP

## (undated) DEVICE — RADIFOCUS GLIDEWIRE: Brand: GLIDEWIRE

## (undated) DEVICE — DRAPE SURG W41XL74IN CLR FULL SZ C ARM 3 ADH POLY STRP E

## (undated) DEVICE — INTENDED USED TO PROTECT, TAG AND HELP LOCATED SUTURES DURING SURGERY: Brand: STERION®SUTURE AID BOOTIES

## (undated) DEVICE — 3M™ STERI-DRAPE™ ISOLATION BAG, 10 PER CARTON / 4 CARTONS PER CASE, 1003: Brand: 3M™ STERI-DRAPE™

## (undated) DEVICE — SUTURE VCRL SZ 0 L36IN ABSRB UD L36MM CT-1 1/2 CIR J946H

## (undated) DEVICE — 3M™ STERI-DRAPE™ INSTRUMENT POUCH 1018: Brand: STERI-DRAPE™

## (undated) DEVICE — SUTURE VCRL SZ 4-0 L18IN ABSRB UD L19MM PS-2 3/8 CIR PRIM J496H

## (undated) DEVICE — KIT CATH 16FR 5ML URIN M INDWL INDWL STR TIP INF CTRL

## (undated) DEVICE — CANNULA PERF L2IN BLNT TIP 3MM VES CLR RADPQ BODY FEM LUER D

## (undated) DEVICE — CHLORAPREP 26ML ORANGE

## (undated) DEVICE — KIT NEG PRSS FOR NONLIN OR UPTO 90CM LIN INCIS PREVENA +

## (undated) DEVICE — BANDAGE COBAN 4 IN COMPR W4INXL5YD FOAM COHESIVE QUIK STK SELF ADH SFT

## (undated) DEVICE — LOOP VES L12IN DIA0.066IN WHT SIL THN WALL AIR CUSH

## (undated) DEVICE — SUTURE PERMAHAND SZ 3-0 L18IN NONABSORBABLE BLK SILK BRAID A184H

## (undated) DEVICE — INTENDED FOR TISSUE SEPARATION, AND OTHER PROCEDURES THAT REQUIRE A SHARP SURGICAL BLADE TO PUNCTURE OR CUT.: Brand: BARD-PARKER ® CARBON RIB-BACK BLADES

## (undated) DEVICE — GAUZE,SPONGE,4"X4",12PLY,STERILE,LF,2'S: Brand: MEDLINE

## (undated) DEVICE — YANKAUER,BULB TIP,W/O VENT,RIGID,STERILE: Brand: MEDLINE

## (undated) DEVICE — DECANTER BAG 9": Brand: MEDLINE INDUSTRIES, INC.

## (undated) DEVICE — BANDAGE COMPR W4INXL5YD BGE HI E W/ REM CLP SURE-WRAP

## (undated) DEVICE — GAUZE,SPONGE,4"X4",16PLY,XRAY,STRL,LF: Brand: MEDLINE

## (undated) DEVICE — FOGARTY - HYDRAGRIP SURGICAL - CLAMP INSERTS: Brand: FOGARTY SAFEJAW

## (undated) DEVICE — MICROPUNCTURE INTRODUCER SET SILHOUETTE TRANSITIONLESS WITH NITINOL WIRE GUIDE: Brand: MICROPUNCTURE

## (undated) DEVICE — SYRINGE IRRIG 60ML SFT PLIABLE BLB EZ TO GRP 1 HND USE W/

## (undated) DEVICE — PACK ORTHOPEDIC 1 TBL CVR 50X90 REINF 1 MAYO STD CVR 24X53 REINF 4 UTIL

## (undated) DEVICE — STOCKINETTE ORTH W6XL48IN OFF WHT SGL PLY UNBLEACHED COT RIB

## (undated) DEVICE — COVER,MAYO STAND,STERILE: Brand: MEDLINE

## (undated) DEVICE — APPLIER CLP L9.375IN APER 2.1MM CLS L3.8MM 20 SM TI CLP

## (undated) DEVICE — 3M™ TEGADERM™ TRANSPARENT FILM DRESSING FRAME STYLE, 1626W, 4 IN X 4-3/4 IN (10 CM X 12 CM), 50/CT 4CT/CASE: Brand: 3M™ TEGADERM™

## (undated) DEVICE — SUCKER CARD L9IN 0.25IN CONN MINI RIG SFT TIP W/ SIDE PRT

## (undated) DEVICE — GLOVE SURG SZ 85 L12IN FNGR THK94MIL TRNSLUC YEL LTX

## (undated) DEVICE — 35 ML SYRINGE LUER-LOCK TIP: Brand: MONOJECT

## (undated) DEVICE — STOCKINETTE,IMPERVIOUS,12X48,STERILE: Brand: MEDLINE

## (undated) DEVICE — SUTURE VCRL SZ 2-0 L36IN ABSRB UD L36MM CT-1 1/2 CIR J945H

## (undated) DEVICE — TIBURON TOP SHEET: Brand: CONVERTORS

## (undated) DEVICE — RADIFOCUS GLIDECATH: Brand: GLIDECATH

## (undated) DEVICE — SECTO® DISSECTOR, KITTNER, 5/16 IN DIAMETER, (5 EA/POUCH, 24 POUCHES/PK, 4 PK/BX): Brand: SYMMETRY SURGICAL

## (undated) DEVICE — NEEDLE ANGIO COURNAND THN 3 PART 18GAX5.1CM MAJESTIK

## (undated) DEVICE — FOGARTY THRU-LUMEN EMBOLECTOMY CATHETER 6F 80CM: Brand: FOGARTY

## (undated) DEVICE — 12" (30.5 CM) ARTERIAL PRESSURE TUBING: Brand: ICU MEDICAL

## (undated) DEVICE — SUTURE PERMAHAND SZ 2-0 L12X18IN NONABSORBABLE BLK SILK A185H

## (undated) DEVICE — NEEDLE HYPO 25GA L1.5IN BLU POLYPR HUB S STL REG BVL STR

## (undated) DEVICE — Z DISCONTINUED USE 2744636  DRESSING AQUACEL 14 IN ALG W3.5XL14IN POLYUR FLM CVR W/ HYDRCOLL

## (undated) DEVICE — SPONGE,LAP,18"X18",DLX,XR,ST,5/PK,40/PK: Brand: MEDLINE

## (undated) DEVICE — Z DISCONTINUED PER MEDLINE USE 2741943 DRESSING AQUACEL 10 IN ALG W9XL25CM SIL CVR WTRPRF VIR BACT BARR ANTIMIC

## (undated) DEVICE — 3 ML SYRINGE LUER-LOCK TIP: Brand: MONOJECT

## (undated) DEVICE — APPLIER CLP L9.38IN M LIG TI DISP STR RNG HNDL LIGACLP

## (undated) DEVICE — BLADE SURG SAW SAG S STL 85MM LEN 20.5MM W 1MM THCK 85MM CUT

## (undated) DEVICE — SHEATH INTRO 5FR L11CM HEMSTAT VLV DIL SIDE PRT RADPQ W/O

## (undated) DEVICE — TOWEL,OR,DSP,ST,BLUE,STD,4/PK,20PK/CS: Brand: MEDLINE